# Patient Record
Sex: FEMALE | Race: WHITE
[De-identification: names, ages, dates, MRNs, and addresses within clinical notes are randomized per-mention and may not be internally consistent; named-entity substitution may affect disease eponyms.]

---

## 2021-01-23 ENCOUNTER — HOSPITAL ENCOUNTER (INPATIENT)
Dept: HOSPITAL 97 - ER | Age: 66
LOS: 4 days | Discharge: HOME | DRG: 291 | End: 2021-01-27
Attending: HOSPITALIST | Admitting: INTERNAL MEDICINE
Payer: COMMERCIAL

## 2021-01-23 VITALS — BODY MASS INDEX: 42.6 KG/M2

## 2021-01-23 DIAGNOSIS — Z90.49: ICD-10-CM

## 2021-01-23 DIAGNOSIS — N18.4: ICD-10-CM

## 2021-01-23 DIAGNOSIS — I16.9: ICD-10-CM

## 2021-01-23 DIAGNOSIS — I13.0: Primary | ICD-10-CM

## 2021-01-23 DIAGNOSIS — I50.31: ICD-10-CM

## 2021-01-23 DIAGNOSIS — E87.8: ICD-10-CM

## 2021-01-23 DIAGNOSIS — E87.6: ICD-10-CM

## 2021-01-23 DIAGNOSIS — N17.0: ICD-10-CM

## 2021-01-23 DIAGNOSIS — Z79.899: ICD-10-CM

## 2021-01-23 DIAGNOSIS — Z98.84: ICD-10-CM

## 2021-01-23 DIAGNOSIS — Z20.822: ICD-10-CM

## 2021-01-23 DIAGNOSIS — D64.9: ICD-10-CM

## 2021-01-23 DIAGNOSIS — Z91.14: ICD-10-CM

## 2021-01-23 DIAGNOSIS — Z79.4: ICD-10-CM

## 2021-01-23 DIAGNOSIS — E11.40: ICD-10-CM

## 2021-01-23 DIAGNOSIS — I25.119: ICD-10-CM

## 2021-01-23 DIAGNOSIS — E11.22: ICD-10-CM

## 2021-01-23 DIAGNOSIS — Z98.51: ICD-10-CM

## 2021-01-23 LAB
ALBUMIN SERPL BCP-MCNC: 3 G/DL (ref 3.4–5)
ALP SERPL-CCNC: 78 U/L (ref 45–117)
ALT SERPL W P-5'-P-CCNC: 13 U/L (ref 12–78)
AST SERPL W P-5'-P-CCNC: 15 U/L (ref 15–37)
BUN BLD-MCNC: 23 MG/DL (ref 7–18)
GLUCOSE SERPLBLD-MCNC: 148 MG/DL (ref 74–106)
HCT VFR BLD CALC: 32.7 % (ref 36–45)
INR BLD: 0.95
LYMPHOCYTES # SPEC AUTO: 2.5 K/UL (ref 0.7–4.9)
MAGNESIUM SERPL-MCNC: 2.1 MG/DL (ref 1.8–2.4)
NT-PROBNP SERPL-MCNC: 1767 PG/ML (ref ?–125)
PMV BLD: 8.8 FL (ref 7.6–11.3)
POTASSIUM SERPL-SCNC: 3.3 MMOL/L (ref 3.5–5.1)
RBC # BLD: 3.89 M/UL (ref 3.86–4.86)
SARS-COV-2 RNA RESP QL NAA+PROBE: NEGATIVE
TROPONIN (EMERG DEPT USE ONLY): 0.03 NG/ML (ref 0–0.04)

## 2021-01-23 PROCEDURE — 84439 ASSAY OF FREE THYROXINE: CPT

## 2021-01-23 PROCEDURE — 76770 US EXAM ABDO BACK WALL COMP: CPT

## 2021-01-23 PROCEDURE — 87040 BLOOD CULTURE FOR BACTERIA: CPT

## 2021-01-23 PROCEDURE — 83880 ASSAY OF NATRIURETIC PEPTIDE: CPT

## 2021-01-23 PROCEDURE — 96375 TX/PRO/DX INJ NEW DRUG ADDON: CPT

## 2021-01-23 PROCEDURE — 36415 COLL VENOUS BLD VENIPUNCTURE: CPT

## 2021-01-23 PROCEDURE — 82570 ASSAY OF URINE CREATININE: CPT

## 2021-01-23 PROCEDURE — 85610 PROTHROMBIN TIME: CPT

## 2021-01-23 PROCEDURE — 84132 ASSAY OF SERUM POTASSIUM: CPT

## 2021-01-23 PROCEDURE — 80061 LIPID PANEL: CPT

## 2021-01-23 PROCEDURE — 83735 ASSAY OF MAGNESIUM: CPT

## 2021-01-23 PROCEDURE — 81001 URINALYSIS AUTO W/SCOPE: CPT

## 2021-01-23 PROCEDURE — 82947 ASSAY GLUCOSE BLOOD QUANT: CPT

## 2021-01-23 PROCEDURE — 84156 ASSAY OF PROTEIN URINE: CPT

## 2021-01-23 PROCEDURE — 93306 TTE W/DOPPLER COMPLETE: CPT

## 2021-01-23 PROCEDURE — 99285 EMERGENCY DEPT VISIT HI MDM: CPT

## 2021-01-23 PROCEDURE — 85025 COMPLETE CBC W/AUTO DIFF WBC: CPT

## 2021-01-23 PROCEDURE — 71045 X-RAY EXAM CHEST 1 VIEW: CPT

## 2021-01-23 PROCEDURE — 80048 BASIC METABOLIC PNL TOTAL CA: CPT

## 2021-01-23 PROCEDURE — 84443 ASSAY THYROID STIM HORMONE: CPT

## 2021-01-23 PROCEDURE — 96365 THER/PROPH/DIAG IV INF INIT: CPT

## 2021-01-23 PROCEDURE — 84300 ASSAY OF URINE SODIUM: CPT

## 2021-01-23 PROCEDURE — 0240U: CPT

## 2021-01-23 PROCEDURE — 83036 HEMOGLOBIN GLYCOSYLATED A1C: CPT

## 2021-01-23 PROCEDURE — 84484 ASSAY OF TROPONIN QUANT: CPT

## 2021-01-23 PROCEDURE — 93005 ELECTROCARDIOGRAM TRACING: CPT

## 2021-01-23 PROCEDURE — 80076 HEPATIC FUNCTION PANEL: CPT

## 2021-01-23 NOTE — XMS REPORT
Summary of Care

                           Created on:November 3, 2020



Patient:Josette Faye

Sex:Female

:1955

External Reference #:[id number]





Demographics







                          Address                   [address]



                                                    [place] [address]

 

                          Mobile Phone              [phone]

 

                          Home Phone                [phone]

 

                          Email Address             [email or web address]

 

                          Email Address             [email or web address]

 

                          Preferred Language        English

 

                          Marital Status            Single

 

                          Gnosticist Affiliation     Unknown

 

                          Race                      White

 

                          Ethnic Group              Not  or 









Author







                          Organization              [facility]

 

                          Address                   [address] [place] [id number]









Support







                Name            Relationship    Address         Phone

 

                Jasiel Barber    Unavailable     [address]    [phone]



                                                [place] [address] 

 

                Kristy Luther      Unavailable     [address] [phone]



                                                [place] [address] 









Care Team Providers







                    Name                Role                Phone

 

                    Yinka Luis F DEQUAN     Primary Care Provider [phone]









Reason for Visit







                          Reason                    Comments

 

                          LAB WORK                  







Encounter Details







             Date         Type         Department   Care Team    Description

 

                2020      Technician Visit [facility]     Juan Thompson M D



[address] [place], [place] [address] [phone] (Fax)                      Uncontrolled type 2 diabetes with neurop

athy;



                                                Professional Office 



2, [facility]                              Dyslipidemia



                                       Building Phlebotomy              



                                                            Lab



                                                    



                                       Professional Office              



                                                            Building



                                                    



                                       [address] [place] [address]              



                                                            , [address]



                                                    



                                       [place], [place]              



                                                            [address] [phone]              







Allergies

No Known Allergiesdocumented as of this encounter (statuses as of 2020)



Medications







          Medication Sig       Dispensed Refills   Start Date End Date  Status

 

          amitriptyline 25 mg TAKE 1 TABLET BY           3         2018   

        Active



          tablet    MOUTH TWICE A                                         



                    DAY                                               

 

          amLODIPine 10 mg tablet Take 10 mg by           0         2018  

         Active



                    mouth daily.                                         

 

          Insulin Needles, Use as directed. 360 Each  1         2018      

     Active



          Disposable, (MIHIR PEN 4 times daily. E                                

         



          NEEDLE) 32 gauge x " 11.40                                        

     



          NdleIndications:                                                   



          Uncontrolled type 2                                                   



          diabetes with neuropathy                                              

     

 

          aspirin 81 mg chewable Take 81 mg by           0                      

       Active



          tablet    mouth daily.                                         

 

          gabapentin  mg Take 300 mg by           0                       

      Active



          tablet, extended release mouth daily.                                 

        



          24 hr                                                       

 

          potassium chloride                     0         10/31/2019           

Active



          (KLOR-CON 10) 10 mEq CR                                               

    



          tablet                                                      

 

          losartan-hydrochlorothia                     0         2020     

      Active



          zide 50-12.5 mg per                                                   



          tablet                                                      

 

          furosemide 40 mg tablet                     0         2020      

     Active

 

          insulin aspart U-100 inject 20 Units 54 mL     1         2020   

        Active



          (NOVOLOG FLEXPEN U-100 under the skin 3                               

          



          INSULIN) 100 unit/mL (3 (three) times                                 

        



          mL)       daily before                                         



          injectionIndications: meals. E11.65                                   

      



          Uncontrolled type 2                                                   



          diabetes with neuropathy                                              

     

 

          TRESIBA FLEXTOUCH U-200 inject 60 Units 30 mL     1         2020

           Active



          200 unit/mL (3 mL) under the skin                                     

    



          InPnIndications: every morning.                                       

  



          Uncontrolled type 2 E11.65                                            



          diabetes with neuropathy                                              

     

 

          atorvastatin 20 mg Take 1 tablet by 90 tablet 1         2020    

       Active



          tabletIndications: mouth at                                          



          Dyslipidemia bedtime.                                          



documented as of this encounter (statuses as of 2020)



Active Problems







                          Problem                   Noted Date

 

                          Background diabetic retinopathy of right eye determine

d by examination 

2020

 

                          RADHA (acute kidney injury) 2017

 

                          Obesity (BMI 30-39.9)     2017

 

                          Callus of foot            2017

 

                          Thyromegaly               2017

 

                          Vitamin D deficiency      2016

 

                          Essential hypertension    2016

 

                          Uncontrolled type 2 diabetes with neuropathy 

6

 

                          CKD (chronic kidney disease), stage 3 (moderate) 

 

                          HLD (hyperlipidemia)      2016



documented as of this encounter (statuses as of 2020)



Immunizations







                    Name                Administration Dates Next Due

 

                    Influenza Virus Vaccine Quad .5 mL IM 6+ MO 2020      

    



documented as of this encounter



Social History







             Tobacco Use  Types        Packs/Day    Years Used   Date

 

             Never Smoker                                        









                Smokeless Tobacco: Never Used                                 









                Alcohol Use     Drinks/Week     oz/Week         Comments

 

                No              0 Standard drinks or equivalent 0.0             









                          Sex Assigned at Birth     Date Recorded

 

                          Not on file               









                    COVID-19 Exposure   Response            Date Recorded

 

                    In the last month, have you been in contact with No / Unsure

         11/3/2020  9:47 AM 

CST



                    someone who was confirmed or suspected to have              

       



                    Coronavirus / COVID-19?                     



documented as of this encounter



Last Filed Vital Signs

Not on filedocumented in this encounter



Nursing Notes

Iesha Balbuena - 2020 11:30 AM CSTFormatting of this note might be 
different from the original.

Venipuncture collection performed by clean technique on the right anticubitus. 
Total of 1 attempts were made. Slight pressure and a bandage/dressing were 
applied to the site(s). The patient experiencedno complications. The following 
specimens were processed according to instructions and sent to [facility]
per lab order on today:



 LT BLUE

 SST 1

 RED

 LAV 1

 PPT

 DK GREEN (LiHep)

  DK GREEN (SodH)

 GRAY

 DK BLUE (K2)

 DK BLUE (S)

 ACD

 Blood Culture

 NIPT/NTD

Electronically signed by Iesha Balbuena at 2020 12:00 PM CST
documented in this encounter



Plan of Treatment







             Date         Type         Specialty    Care Team    Description

 

                2021      Office Visit    Endocrinology Diabetes & Jose Thompson MD



                                        



                                       Metabolism   [facility] [place] 

[address] [phone] (Fax) 









                Health Maintenance Due Date        Last Done       Comments

 

                HEPATITIS C (HCV) SCREEN 1955                      

 

                PNEUMOCOCCAL 0-64 YEARS COMBINED 1963                     

 



                SERIES (1 of 3 - PCV13)                                 

 

                DTaP,Tdap,and Td Vaccines (1 - 1976                      



                Tdap)                                           

 

                PAP SMEAR       1978                      

 

                Breast Cancer Screening 1997                      



                (MAMMOGRAM)                                     

 

                COLON CANCER SCREENING ANNUAL 2007                      



                FIT/FOBT                                        

 

                COLON CANCER SCREENING FIT DNA 2007                      



                EVERY 3 YEARS                                   

 

                COLON CANCER SCREENING 2007                      



                SIGMOIDOSCOPY EVERY 5 YEARS                                 

 

                COLONOSCOPY     2007                      

 

                Colorectal Cancer Screening 2007                      

 

                Zoster Recombinant Vaccine 2007                      



                (SHINGRIX) (1 of 2)                                 

 

                CREATININE (SERUM) 2020, 10/04/2018, 



                                                2017, Additional history 



                                                exists          

 

                FOOT EXAM       2020, 2019, 



                                                10/30/2018, Additional history 



                                                exists          

 

                LDL-C           2020, 2017, 



                                                2016      

 

                URINE MICROALBUMIN 2020, 2017 

 

                HgA1C           2021, 2020, 



                                                2019, Additional history 



                                                exists          

 

                EYE EXAM        2021, 2019 

 

                Depression Screening 2021      

 

                INFLUENZA VACCINE Completed       2020      



documented as of this encounter



Implants







          Implanted Type      Area       Device    Shelf     Model /

 Serial



                                                  Identifier Expiration / Lot



                                                            Date      

 

          Lens, Jaime #[id number] - [id number] LENS      Right:    Jaime         

      2022 [id number] /



                                        Implanted: Qty: 1 on 10/25/2018 by Luis Ng MD at [facility]            Eye                                         2

[id number] /



                                                                      [id number]

81

 

          Lens, Jaime #[id number] - [id number] LENS      Left: Eye Jaime          

     2023 [id number] /



                                        Implanted: Qty: 1 on 2019 by Luis Ng MD at [facility]                                                        2

[id number] /



                                                                      N/A



documented as of this encounter



Results

Not on filedocumented in this encounter



Visit Diagnoses







                                        Diagnosis

 

                                        Uncontrolled type 2 diabetes with neurop

athy







                                        Type II or unspecified type diabetes villa

litus with neurological manifestations,



                                        uncontrolled

 

                                        Dyslipidemia







                                        Other and unspecified hyperlipidemia



documented in this encounter



Insurance







          Payer     Benefit Plan Subscriber ID Effective Dates Phone     Address

   Type



                    / Group                                           

 

          [facility] [id number] 2019-Michael [phone] [address]

[address]   PP[address]



           [place]      - OUT OF              t          [address]          [id number]



                                        



                    [place], [place] 



                                                            [address]     









           Guarantor Name Account Type Relation to Date of Birth Phone      Bill

ing



                                 Patient                          Address

 

           FayeJosette Personal/Family Self       1955 [phone] [address]



                                                       (Home)     [address]







                                                                  [place]



                                                                  [address]



documented as of this encounter

## 2021-01-23 NOTE — XMS REPORT
Summary of Care

                           Created on:2020



Patient:Josette Faye

Sex:Female

:1955

External Reference #:[id number]





Demographics







                          Address                   [address]



                                                    [place] [address]

 

                          Mobile Phone              [phone]

 

                          Home Phone                [phone]

 

                          Email Address             [email or web address]

 

                          Email Address             [email or web address]

 

                          Preferred Language        English

 

                          Marital Status            Single

 

                          Synagogue Affiliation     Unknown

 

                          Race                      White

 

                          Ethnic Group              Not  or 









Author







                          Organization              [facility]

 

                          Address                   [address] [place] [id number]









Support







                Name            Relationship    Address         Phone

 

                Jasiel Barber    Unavailable     [address]    [phone]



                                                [place] [address] 

 

                Kristy Luther      Unavailable     [address] [phone]



                                                [place] [address] 









Care Team Providers







                    Name                Role                Phone

 

                    Luis F Honeycutt     Primary Care Provider [phone]









Reason for Visit







                          Reason                    Comments

 

                          Follow-up                 

 

                          Diabetes Mellitus II      

 

                          LAB                       POCT A1c







Encounter Details







             Date         Type         Department   Care Team    Description

 

                2020      Office Visit    [facility]     Juan Thompson MD



                                        Uncontrolled type 2 diabetes with neurop

athy (Primary Dx);



                                       Endocrinology- [address] [facility] Flu vacc

ine need;



                                                            [facility]



                                        Dyslipidemia;



                                       [address], [place] Stage 3

b chronic kidney disease;



                                                            [facility], [address]



                                        [address]



                                        Essential hypertension, benign



                                                [place], [place]    [phone] [address] [phone] (Fax)        



                                       [phone]              







Allergies

No Known Allergiesdocumented as of this encounter (statuses as of 2020)



Medications







          Medication Sig       Dispensed Refills   Start     End Date  Status



                                                  Date                

 

          amitriptyline 25 TAKE 1 TABLET           3                   

 Active



          mg tablet BY MOUTH TWICE                     8                   



                    A DAY                                             

 

          amLODIPine 10 mg Take 10 mg by           0                   

 Active



          tablet    mouth daily.                     8                   

 

          Insulin Needles, Use as    360 Each  1                    Act

melania



          Disposable, (MIHIR directed. 4                     8                   



          PEN NEEDLE) 32 times daily. E                                         



          gauge x " 11.40                                             



          NdleIndications:                                                   



          Uncontrolled type                                                   



          2 diabetes with                                                   



          neuropathy                                                   

 

          aspirin 81 mg Take 81 mg by           0                             Ac

tive



          chewable tablet mouth daily.                                         

 

          gabapentin  Take 300 mg by           0                          

   Active



          mg tablet, mouth daily.                                         



          extended release                                                   



          24 hr                                                       

 

          potassium chloride                     0         10/31/201           A

ctive



          (KLOR-CON 10) 10                               9                   



          mEq CR tablet                                                   

 

          losartan-hydrochlo                     0                    A

ctive



          rothiazide 50-12.5                               0                   



          mg per tablet                                                   

 

          furosemide 40 mg                     0                    Act

melania



          tablet                                  0                   

 

          insulin aspart inject 20 54 mL     1                    Activ

e



          U-100 (NOVOLOG Units under                     0                   



          FLEXPEN U-100 the skin 3                                         



          INSULIN) 100 (three) times                                         



          unit/mL (3 mL) daily before                                         



          injectionIndicatio meals. E11.65                                      

   



          ns: Uncontrolled                                                   



          type 2 diabetes                                                   



          with neuropathy                                                   

 

          TRESIBA FLEXTOUCH inject 60 30 mL     1                    Ac

tive



          U-200 200 unit/mL Units under                     0                   



          (3 mL)    the skin every                                         



          InPnIndications: morning.                                          



          Uncontrolled type E11.65                                            



          2 diabetes with                                                   



          neuropathy                                                   

 

          atorvastatin 20 mg Take 1 tablet 90 tablet 1                 

   Active



          tabletIndications: by mouth at                     0                  

 



          Dyslipidemia bedtime.                                          

 

          Insulin   Use as    150 Syringe 5         10/12/201 11/03/20  Disconti

nued



          Syringe-Needle directed, BID,                     6         20        



          U-100 (INSULIN Dx;E11.9                                          



          SYRINGE) 1 mL 30                                                   



          gauge x 5/16 Syrg                                                   

 

          atorvastatin 20 mg Take 1 tablet 90 tablet 1         

0  Discontinued



          tabletIndications: by mouth at                     0         20       

 (Reorder)



          Dyslipidemia bedtime.                                          

 

          NOVOLIN 70-30 Take 65 units 36 mL     1         20  Di

scontinued



          FLEXPEN U-100 100 with breakfast                     0         20     

   



          unit/mL (70-30) and 45 units                                         



          injectionIndicatio with dinner                                        

 



          ns: Uncontrolled                                                   



          type 2 diabetes                                                   



          with neuropathy                                                   

 

          TRESIBA FLEXTOUCH                     0         20  Di

scontinued



          U-100 100 unit/mL                               0         20        



          (3 mL) InPn                                                   

 

          NOVOLOG FLEXPEN                     0         20  Disc

ontinued



          U-100 INSULIN 100                               0         20        (R

eorder)



          unit/mL (3 mL)                                                   



          injection                                                   



documented as of this encounter (statuses as of 2020)



Active Problems







                          Problem                   Noted Date

 

                          Background diabetic retinopathy of right eye determine

d by examination 

2020

 

                          RADHA (acute kidney injury) 2017

 

                          Obesity (BMI 30-39.9)     2017

 

                          Callus of foot            2017

 

                          Thyromegaly               2017

 

                          Vitamin D deficiency      2016

 

                          Essential hypertension    2016

 

                          Uncontrolled type 2 diabetes with neuropathy 

6

 

                          CKD (chronic kidney disease), stage 3 (moderate) 

 

                          HLD (hyperlipidemia)      2016



documented as of this encounter (statuses as of 2020)



Immunizations







                    Name                Administration Dates Next Due

 

                    Influenza Virus Vaccine Quad .5 mL IM 6+ MO 2020      

    



documented as of this encounter



Social History







             Tobacco Use  Types        Packs/Day    Years Used   Date

 

             Never Smoker                                        









                Smokeless Tobacco: Never Used                                 









                Alcohol Use     Drinks/Week     oz/Week         Comments

 

                No              0 Standard drinks or equivalent 0.0             









                          Sex Assigned at Birth     Date Recorded

 

                          Not on file               









                    COVID-19 Exposure   Response            Date Recorded

 

                    In the last month, have you been in contact with No / Unsure

         11/3/2020  9:47 AM 

CST



                    someone who was confirmed or suspected to have              

       



                    Coronavirus / COVID-19?                     



documented as of this encounter



Last Filed Vital Signs







                Vital Sign      Reading         Time Taken      Comments

 

                Blood Pressure  166/85          2020 10:07 AM 



                                                CST             

 

                Pulse           90              2020 10:03 AM 



                                                CST             

 

                Temperature     -               -               

 

                Respiratory Rate -               -               

 

                Oxygen Saturation -               -               

 

                Inhaled Oxygen Concentration -               -               

 

                Weight          109.6 kg (241 lb 9.6 oz) 2020 10:03 AM 



                                                CST             

 

                Height          162.6 cm (5' 4") 2020 10:03 AM 



                                                CST             

 

                Body Mass Index 41.47           2020 10:03 AM 



                                                CST             



documented in this encounter



Patient Instructions

Patient InstructionsJuan Thompson MD - 2020 10:00 AM CSTIncrease Tresiba 
to 60 units once at morning daily



Continue Novolog  20 units with breakfast  lunch  And  dinner



Start to take atorvastatin 20 units dailyElectronically signed by Juan Thompson MD at 2020  4:58 PM CST

documented in this encounter



Progress Notes

Juan Thompson MD - 2020 10:00 AM CSTFormatting of this note might be 
different from the original.

chief complaint: Type 2 diabetes mellitus- follow up



HPI



Patient is a 64 year old /White female who is here today for Diabetes 
Mellitus Type 2.  Patient's diabetes is complicated by atherogenic diet, 
hypertension , nephropathy: with chronic kidney disease stage, neuropathy: 
Peripheral and lower extremity, retinopathy and obesity. DM has been poorly
controlled with A1C at 9-14 range



LOV was in 2020 with A1C at 11.3 due to not taking insulin Patient was 
advised to increase Tresiba to 55 units and  Novolog to 20 units TIDAC . She ran
out medication a month ago, BG at 400-500s in past month

Glucose readings: Did not bring. Not checking regularly. Reported fasting 120-
300s when taking Tresiba and Novolog consistently; Denies hypoglycemias

She continue being noncompliant with diet/exercise in recent quarantine. She 
gained 19lbs since LOV



Dyslipidemia: resumed Lipitor 20mg in LOV



CKD stage 4: Patient was following  nephrology.

HTN: Managed by PCP

Neuropathy: Managed by PCP



TPO and TFT's normal in  and again in 2019. States her ophthalmologist 
told her she has thyroid disease.



DIABETIC HEALTH MAINTENANCE

Last Ophthalmology visit was 2020, S/p cataract surgery, +on active treatment
right retinopathy. F/u 6months

Patient on ACE/ARB therapy - No ( Hyperkalemia)

Patient on ASA therapy - No.

Patient on Statin/Fibrate therapy -No

Patient instructed about daily feet exams, last sensation exam was today

Patient has received Nutrition/Diet/Diabetes Education on 2019



Patient's Medications

START taking these medications

 TRESIBA FLEXTOUCH U-200 200 UNIT/ML (3 ML) INPN    inject 60 Units under the 
skin every morning. E11.65

CONTINUE taking these medications which have NOT CHANGED

 AMITRIPTYLINE 25 MG TABLET    TAKE 1 TABLET BY MOUTH TWICE A DAY

 AMLODIPINE 10 MG TABLET    Take 10 mg by mouth daily.

 ASPIRIN 81 MG CHEWABLE TABLET    Take 81 mg by mouth daily.

 FUROSEMIDE 40 MG TABLET

 GABAPENTIN  MG TABLET, EXTENDED RELEASE 24 HR    Take 300 mg by mouth 
daily.

 INSULIN NEEDLES, DISPOSABLE, (MIHIR PEN NEEDLE) 32 GAUGE X 5/32" NDLE    Use as 
directed. 4 times daily. E 11.40

 LOSARTAN-HYDROCHLOROTHIAZIDE 50-12.5 MG PER TABLET

 POTASSIUM CHLORIDE (KLOR-CON 10) 10 MEQ CR TABLET

START taking Modified Medications as Prescribed

 Modified Medication Previous Medication

 ATORVASTATIN 20 MG TABLET atorvastatin 20 mg tablet

    Take 1 tablet by mouth at bedtime.    Take 1 tablet by mouth at bedtime.

 INSULIN ASPART U-100 (NOVOLOG FLEXPEN U-100 INSULIN) 100 UNIT/ML (3 ML) 
INJECTION NOVOLOG FLEXPEN U-100 INSULIN 100 unit/mL (3 mL) injection

    inject 20 Units under the skin 3 (three) times daily before meals. E11.65

STOP taking these medications

 INSULIN SYRINGE-NEEDLE U-100 (INSULIN SYRINGE) 1 ML 30 GAUGE X 5/16 SYRG    Use
as directed, BID, Dx;E11.9

 NOVOLIN 70-30 FLEXPEN U-100 100 UNIT/ML (70-30) INJECTION    Take 65 units with
breakfast and 45 units with dinner

 TRESIBA FLEXTOUCH U-100 100 UNIT/ML (3 ML) INPN



HISTORY

Past Medical History:

Diagnosis Date

 RADHA (acute kidney injury)

 Cataract

 DM2 (diabetes mellitus, type 2)

 Hypertension



Past Surgical History:

Procedure Laterality Date

 CHOLECYSTECTOMY

 ENDOSCOPIC CARPAL TUNNEL RELEASE

 OTHER

 Gastric staple

 PHACOEMULSIFICATION OF CATARACT WITH INTRAOCULAR LENS IMPLANT Right 
10/25/2018

 Surgeon: Luis Ng MD;  Location: [facility]

 PHACOEMULSIFICATION OF CATARACT WITH INTRAOCULAR LENS IMPLANT Left 2019

 Surgeon: Luis Ng MD;  Location: [facility]



Family History

Problem Relation Age of Onset

 Coronary Heart Disease Brother



Social History



Socioeconomic History

 Marital status: Single

  Spouse name: Not on file

 Number of children: Not on file

 Years of education: Not on file

 Highest education level: Not on file

Occupational History

 Not on file

Social Needs

 Financial resource strain: Not on file

 Food insecurity

  Worry: Not on file

  Inability: Not on file

 Transportation needs

  Medical: Not on file

  Non-medical: Not on file

Tobacco Use

 Smoking status: Never Smoker

 Smokeless tobacco: Never Used

Substance and Sexual Activity

 Alcohol use: No

  Alcohol/week: 0.0 standard drinks

 Drug use: No

 Sexual activity: Not on file

Lifestyle

 Physical activity

  Days per week: Not on file

  Minutes per session: Not on file

 Stress: Not on file

Relationships

 Social connections

  Talks on phone: Not on file

  Gets together: Not on file

  Attends [employer] service: Not on file

  Active member of club or organization: Not on file

  Attends meetings of clubs or organizations: Not on file

  Relationship status: Not on file

 Intimate partner violence

  Fear of current or ex partner: Not on file

  Emotionally abused: Not on file

  Physically abused: Not on file

  Forced sexual activity: Not on file

Other Topics Concern

 Not on file

Social History Narrative

 Not on file



REVIEW OF SYSTEMS

Constitutional: + weight gain, + fatigue

Eyes: + blurry vision R&gt;L, right eye almost blind, denies diplopia and denies
pain.

Neck: denies pain, denies swollen glands

Cardiovascular: denies chest pain , denies irregular pulse and denies 
palpitations.

Respiratory: denies dyspnea on exertion and denies shortness of breath.

Gastrointestinal: denies abdominal pain, denies constipation and denies 
diarrhea.

Genitourinary: denies burning and denies dysuria.

Musculoskeletal: denies back pain, denies muscle pain and denies weakness.

Skin: denies dry skin and denies hair changes.

Neuro: + numbness , + tingling and denies tremor.

Psych: negative.

Endocrine: +hyperglycemia  denies intolerance to heat, denies polydipsia, denies
polyphagia



PHYSICAL EXAM



POCT GLU (mg/dL)

Date Value

2019 268 (H)



CREATININE (mg/dL)

Date Value

2020 2.46 (H)



Creatinine, Serum-LC (mg/dL)

Date Value

2016 1.19 (H)



CHOL (mg/dL)

Date Value

2020 318 (H)



Cholesterol, Total-LC (mg/dL)

Date Value

2016 271 (H)



HDL Cholesterol-LC (mg/dL)

Date Value

2016 64



HDL (mg/dL)

Date Value

2020 36 (L)



LDL CHOL (no units)

Date Value

2020

  Comment:

  Unable to calculate LDL due to elevated triglyceride level greater than 400 
mg/dL.



LDL Cholesterol Calc-LC (mg/dL)

Date Value

2016 169 (H)



TRIG (mg/dL)

Date Value

2020 517 (H)



Triglycerides-LC (mg/dL)

Date Value

2016 190 (H)



MICROAL/CR (mg/g of creatinine)

Date Value

2020 802 (H)



POCT HBA1C (%)

Date Value

2020 13.1 (A)

2020 11.5





BP (!) 166/85  | Pulse 90  | Ht 5' 4" (1.626 m)  | Wt 241 lb 9.6 oz (109.6 kg)  
| BMI 41.47 kg/m

General: alert, oriented times three, no apparent distress, appearing age 
appropriate.

Skin: skin color and turgor are normal

Head: normocephalic, no masses, lesions, tenderness or abnormalities.

Eyes: anicteric sclera,

Neck: +acanthosis nigricans

Thyroid: normal size and consistency to palaption

Lungs: good diaphragmatic excursion, lungs clear to auscultation bilaterally.

Heart: regular rate and rhythm, no murmurs, gallops or rubs.

Abdomen: abdomen soft,

Neuro: unremarkable without focal findings.

Extremities/Musculoskeletal: no cyanosis, no edema .

Sensory exam of the foot is normal. Monofilament exam with sensation Right: 5/5,
Left: 5/5. Lesions absent Ulcers Absent Peripheral pulses present 2+.



ASSESSMENT/PLAN



Uncontrolled type 2 diabetes with neuropathy

-A1C (target=6-7%): 9.4 (10/18) --&gt;12.2() 
---&gt;11.3()---&gt;13.2() poor control due to not taking insulin

-glucose range: reports hyperglycemia thru the day

- without hypoglycemia

-complication: neuropathy CKD

-medication limitation: ?metformin cause GI symptoms

-diet: noncompliant

-exercise: limited by joint pain



Plan

-reinterated to check glucose regualrly alternating fasting and 2 hours post 
meals

-urged compliance with diet/exercise

- POCT HEMOGLOBIN A1C TEST

- COMP. METABOLIC PANEL (77802); Future

- CBC WITH DIFF; Future

- THYROID STIMULATING HORMONE; Future

- T4 FREE; Future

- MICROALBUMIN URINE - SPOT SAMPLE; Future

- insulin aspart U-100 (NOVOLOG FLEXPEN U-100 INSULIN) 100 unit/mL (3 mL) 
injection; inject 20 Unitsunder the skin 3 (three) times daily before meals. 
E11.65  Dispense: 54 mL; Refill: 1

- TRESIBA FLEXTOUCH U-200 200 unit/mL (3 mL) InPn; inject 60 Units under the 
skin every morning. E11.65  Dispense: 30 mL; Refill: 1

Patient Instructions

Increase Tresiba to 60 units once at morning daily



Continue Novolog  20 units with breakfast  lunch  And  dinner



Start to take atorvastatin 20 units daily



Flu vaccine need

- FLU VACC(2716-3033), 6+ MONTHS, IM, QUAD (FLUZONE/FLULAVAL/FLUARIX)



Dyslipidemia

High risk

PCP change to atorvastatin since 2019

Plan

- LIPID PANEL (64284)(TOTAL CHOLESTEROL, TRIGLYCERIDES, HDL); Future

- atorvastatin 20 mg tablet; Take 1 tablet by mouth at bedtime.  Dispense: 90 
tablet; Refill: 1



Stage 3b chronic kidney disease

Essential hypertension, benign

Comment: GFR30

Plan:

Advised to follow up with nephrology



Electronically signed by Juan Thompson MD at 2020  5:13 PM CSTdocumented 
in this encounter



Plan of Treatment







             Date         Type         Specialty    Care Team    Description

 

                2021      Office Visit    Endocrinology Diabetes & Jose Thompson MD



                                        



                                       Metabolism   [address] [place] 

[address]



                                        



                                                                [phone] (Fax) 









                Health Maintenance Due Date        Last Done       Comments

 

                HEPATITIS C (HCV) SCREEN 1955                      

 

                PNEUMOCOCCAL 0-64 YEARS COMBINED 1963                     

 



                SERIES (1 of 3 - PCV13)                                 

 

                DTaP,Tdap,and Td Vaccines (1 - 1976                      



                Tdap)                                           

 

                PAP SMEAR       1978                      

 

                Breast Cancer Screening 1997                      



                (MAMMOGRAM)                                     

 

                COLON CANCER SCREENING ANNUAL 2007                      



                FIT/FOBT                                        

 

                COLON CANCER SCREENING FIT DNA 2007                      



                EVERY 3 YEARS                                   

 

                COLON CANCER SCREENING 2007                      



                SIGMOIDOSCOPY EVERY 5 YEARS                                 

 

                COLONOSCOPY     2007                      

 

                Colorectal Cancer Screening 2007                      

 

                Zoster Recombinant Vaccine 2007                      



                (SHINGRIX) (1 of 2)                                 

 

                FOOT EXAM       2020, 2019, 



                                                10/30/2018, Additional history 



                                                exists          

 

                HgA1C           2021, 2020, 



                                                2019, Additional history 



                                                exists          

 

                EYE EXAM        2021, 2019 

 

                CREATININE (SERUM) 2021, 2019, 



                                                10/04/2018, Additional history 



                                                exists          

 

                Depression Screening 2021      

 

                LDL-C           2021, 2019, 



                                                2017, Additional history 



                                                exists          

 

                URINE MICROALBUMIN 2021, 2019, 



                                                2017      

 

                INFLUENZA VACCINE Completed       2020      



documented as of this encounter



Implants







          Implanted Type      Area       Device    Shelf     Model /

 Serial



                                                  Identifier Expiration / Lot



                                                            Date      

 

          Lens, Jaime #[id number] - [id number] LENS      Right:    Jaime         

      2022 SN60WF /



                                        Implanted: Qty: 1 on 10/25/2018 by Luis Ng MD at [facility]            Eye                                         2

[id number] /



                                                                      [id number]

81

 

          Lens, Jaime #Sn60wf - [id number] LENS      Left: Eye Jaime          

     2023 SN60WF /



                                        Implanted: Qty: 1 on 2019 by Luis Ng MD at [facility]                                                        2

[id number] /



                                                                      N/A



documented as of this encounter



Procedures







             Procedure Name Priority     Date/Time    Associated Diagnosis Comme

nts

 

             FLU VACC     Routine      2020 10:10 Flu vaccine need 



             ([id number]), 6+              AM CST                    



             MONTHS, IM, QUAD                                        

 

             POCT HEMOGLOBIN A1C Routine      2020   Uncontrolled type 2 R

esults for this



             TEST                                   diabetes with procedure are 

in



                                                    neuropathy   the results



                                                                 section.



documented in this encounter



Results

MICROALBUMIN URINE - SPOT SAMPLE (2020  1:17 PM CST)





             Component    Value        Ref Range    Performed At Pathologist Sig

nature

 

             CREAT U      34.4         mg/dL        [facility] LABORATORY 



                                                    SERVICES     

 

             MICROALB U   276 (H)      0 - 45 ug/mL [facility] LABORATORY 



                                                    SERVICES     

 

             MICROAL/CR   802 (H)      0 - 30 mg/g of [facility] LABORATORY 



                                       creatinine   SERVICES     









                                        Specimen

 

                                        Urine - URINE, CLEAN CATCH









                          Narrative                 Performed At

 

                                        Normal: <30 mg/g creatinine



                                        [facility] SERVICES



                                        Microalbuminuria: 30 - 299 mg/g creatini

ne



                                        



                          Clinical albuminuria: > 300 mg/g creatinine 









                Performing Organization Address         City/State/Zipcode Phone

 Number

 

                          [facility] LABORATORY SERVICES  CLIA:  [id number]



                          [place] [address]       [phone]



                                [address] [place] [address]                 



LIPID PANEL (72797)(TOTAL CHOLESTEROL, TRIGLYCERIDES, HDL) (2020 11:36 AM 
CST)





             Component    Value        Ref Range    Performed At Pathologist



                                                                 Signature

 

             CHOL         318 (H)      120 - 200    [facility] 



                                       mg/dL        [facility] LABORATORY 

 

             HDL          36 (L)       >50 mg/dL    [facility] LABORATORY 

 

             HDLC RATIO   8.8 (H)      <=4.5        [facility] LABORATORY 

 

             TRIG         517 (H)      30 - 170 mg/dL [facility] LABORATORY 

 

             LDL CHOL     Comment: Unable to              [facility] 



                          calculate LDL due to              HOSPITAL LAB[facility]ATORY 



                          elevated triglyceride                           



                          level greater than                           



                          400 mg/dL.                             

 

             VLDL         103 (H)      5 - 60 mg/dL [facility] LABORATORY 









                                        Specimen

 

                                        Blood









                Performing Organization Address         City/[facility] Phone

 Number

 

                          [facility] CLIA: [id number]



                          [place] [address]        [phone]



                LABORATORY      [address]                 



T4 FREE (2020 11:36 AM CST)





             Component    Value        Ref Range    Performed At Pathologist Sig

nature

 

             FREE T4      1.36         0.78 - 2.20 [facility]

[facility] 



                                                    LAB[facility]ATORY   









                                        Specimen

 

                                        Blood









                Performing Organization Address         City/[facility] Phone

 Number

 

                          [facility] CLIA: [id number]



                          [place] [address]        [phone]



                LABORATORY      [address]                 



THYROID STIMULATING HORMONE (2020 11:36 AM CST)





             Component    Value        Ref Range    Performed At Pathologist Sig

nature

 

             TSH          3.41         0.45 - 4.70 [facility]IU/L [facility]

AL 



                                                    LABORATORY   









                                        Specimen

 

                                        Blood









                Performing Organization Address         City/State/[facility] Phone

 Number

 

                          [facility] CLIA: [id number]



                          [place] [address]        [phone]



                LABORATORY      [address]                 



CBC WITH DIFF (2020 11:36 AM CST)





             Component    Value        Ref Range    Performed At Pathologist Sig

nature

 

             WBC          7.57         4.30 - 11.10 [facility] 



                                       10*3/L     [facility] LABORATORY 

 

             RBC          3.91 (L)     3.93 - 5.25  [facility] 



                                       10*6/L     [facility] LABORATORY 

 

             HGB          10.8 (L)     11.6 - 15.0  [facility] 



                                       g/dL         [facility] LABORATORY 

 

             HCT          32.8 (L)     35.7 - 45.2 % [facility] LAB[facility]AT[facility] 

 

             MCV          83.9         80.6 - 95.5 fL [facility] LAB[facility]ATORY 

 

             MCH          27.6         25.9 - 32.8 pg [facility] LABORATORY 

 

             MCHC         32.9         31.6 - 35.1  [facility] 



                                       g/dL         [facility] LABORAT[facility] 

 

             RDW-SD       45.2         39.0 - 49.9 fL [facility] LAB[facility]AT[facility] 

 

             RDW-CV       14.8         12.0 - 15.5 % [facility] LAB[facility]AT[facility] 

 

             PLT          335          166 - 358    [facility] 



                                       10*3/L     [facility] LABORATORY 

 

             MPV          11.7         9.5 - 12.9 fL [facility] LAB[facility]AT[facility] 

 

             NRBC/100 WBC 0.0          0.0 - 10.0 /100 [facility] 



                                       WBCs         HOSPITAL LABORATORY 

 

             NRBC x10^3   <0.01        10*3/L     [facility] LABORATORY 

 

             GRAN MAT (NEUT) % 63.9         %            [facility] LABORATORY 

 

             IMM GRAN %   0.50         %            [facility] LAB[facility]ATORY 

 

             LYMPH %      21.7         %            [facility] LAB[facility]ATORY 

 

             MONO %       7.4          %            [facility] LAB[facility]AT[facility] 

 

             EOS %        5.3          %            [facility] LABORATORY 

 

             BASO %       1.2          %            [facility] LAB[facility]ATORY 

 

             GRAN MAT x10^3(ANC) 4.84         1.88 - 7.09  [facility] 



                                       10*3/uL      HOSPITAL LABORATORY 

 

             IMM GRAN x10^3 0.04         0.00 - 0.06  [facility] 



                                       10*3/uL      HOSPITAL LABORATORY 

 

             LYMPH x10^3  1.64         1.32 - 3.29  [facility] 



                                       10*3/uL      HOSPITAL LABORATORY 

 

             MONO x10^3   0.56         0.33 - 0.92  [facility] 



                                       10*3/uL      HOSPITAL LABORATORY 

 

             EOS x10^3    0.40 (H)     0.03 - 0.39  [facility] 



                                       10*3/uL      HOSPITAL LABORATORY 

 

             BASO x10^3   0.09 (H)     0.01 - 0.07  [facility] 



                                       10*3/uL      HOSPITAL LABORATORY 









                                        Specimen

 

                                        Blood









                Performing Organization Address         City/State/Zipcode Phone

 Number

 

                          [facility] CLIA: [id number]



                          [place] [address]        [phone]



                LABORATORY      [address]                 



COMP. METABOLIC PANEL (77625) (2020 11:36 AM CST)





             Component    Value        Ref Range    Performed At Pathologist



                                                                 Signature

 

             NA           126 (L)      135 - 145    [facility] 



                                       mmol/L       [facility] LABORAT[facility] 

 

             K            4.6          3.5 - 5.0    [facility] 



                                       mmol/L       [facility] LAB[facility]AT[facility] 

 

             CL           87 (L)       98 - 108 mmol/L [facility] 

 

             CO2 TOTAL    26           23 - 31 mmol/L [facility] 

 

             AGAP         13           2 - 16       [facility] 

 

             BUN          40 (H)       7 - 23 mg/dL [facility] 

 

             GLUCOSE      606 (HH)     70 - 110 mg/dL [facility] 

 

             CREATININE   2.46 (H)     0.50 - 1.04  [facility] 



                                       mg/dL        [facility] LAB[facility]AT[facility] 

 

             TOTAL BILI   0.7          0.1 - 1.1 mg/dL [facility] 

 

             CALCIUM      9.3          8.6 - 10.6   [facility] 



                                       mg/dL        [facility] LAB[facility]AT[facility] 

 

             T PROTEIN    7.1          6.3 - 8.2 g/dL [facility] 

 

             ALBUMIN      3.9          3.5 - 5.0 g/dL [facility] 

 

             ALK PHOS     118          34 - 122 U/L [facility] 

 

             ALTv         13           5 - 35 U/L   [facility] LAB[facility]AT[facility] 

 

             AST(SGOT)    17           13 - 40 U/L  [facility] 

 

             eGFR Calculation 19.9         mL/min/1.73m2 [facility] 



             ([facility] LABORATORY 



             American)                                           

 

             eGFR Calculation 24.1         mL/min/1.73m2 [facility] 



             (African American)                           [facility] LABORATORY 









                                        Specimen

 

                                        Blood









                          Narrative                 Performed At

 

                          [facility] Glomerular Filtration Rate (GFR) [facility] 

LABORATORY



                                        and Staging of Kidney Disease*



                                        



                                         



                                        



                          +-----------------------+---------------------+- 



                                        ------------------------+



                                        



                          | GFR (mL/min/1.73 m2) | With Kidney Damage 



                                        | Without Kidney Damage



                                        



                          +-----------------------+---------------------+- 



                                        ------------------------+



                                        



                          | >90         | Stage one 



                              |  Normal        



                                        



                                        



                          +-----------------------+---------------------+- 



                                        ------------------------+



                                        



                          | 60-89        | Stage two 



                                            |  Decreased GFR   

  



                                        



                          +-----------------------+---------------------+- 



                                        ------------------------+



                                        



                          | 30-59        | Stage three 



                                           |  Stage three     

 



                                        



                          +-----------------------+---------------------+- 



                                        ------------------------+



                                        



                          | 15-29        | Stage four 



                                            |  Stage four     

 



                                        



                          +-----------------------+---------------------+- 



                                        ------------------------+



                                        



                          | <15 (or dialysis)  | Stage five   



                                          |  Stage five      



                                        



                          +-----------------------+---------------------+- 



                                        ------------------------+



                                        



                                         



                                        



                          *Each stage assumes the associated GFR level has 



                          been in effect for at least three months. 



                          Stages 1 to 5, with or without kidney disease, 



                                        indicate chronic kidney disease.



                                        



                                         



                                        



                          Notes: Determination of stages one and two (with 



                          eGFR >59mL/min/1.73 m2) requires estimation of 



                          kidney damage for at least three months as 



                          defined by structural or functional 



                          abnormalities of the kidney, manifested by 



                                        either:



                                        



                          Pathological abnormalities or Markers of kidney 



                          damage (including abnormalities in the 



                          composition of the blood or urine or 



                                        abnormalities in imaging tests). 



                                        



                                                    









                Performing Organization Address         City/State/Zipcode Phone

 Number

 

                          [facility] CLIA: [id number]



                          [place], [place] [id number] [phone]



                LABORATORY      [address]                 



POCT HEMOGLOBIN A1C TEST (2020)





             Component    Value        Ref Range    Performed At Pathologist Sig

nature

 

             POCT HBA1C   13.1 (A)     4 - 6 %                   









                                        Specimen

 

                                        Blood - CAPILLARY



documented in this encounter



Visit Diagnoses







                                        Diagnosis

 

                                        Uncontrolled type 2 diabetes with neurop

athy - Primary







                                        Type II or unspecified type diabetes villa

litus with neurological manifestations,



                                        uncontrolled

 

                                        Flu vaccine need







                                        Need for prophylactic vaccination and in

oculation against influenza

 

                                        Dyslipidemia







                                        Other and unspecified hyperlipidemia

 

                                        Stage 3b chronic kidney disease

 

                                        Essential hypertension, benign



documented in this encounter



Insurance







          Payer     Benefit Plan Subscriber ID Effective Dates Phone     Address

   Type



                    / Group                                           

 

          [id number] [phone] [address]

[address]   PPO/POS



           [place]      - OUT OF              t          7          [id number]



                                        



                    [place] 



                                                            [address]     









           Guarantor Name Account Type Relation to Date of Birth Phone      Bill

ing



                                 Patient                          Address

 

           Josette Faye Personal/Family Self       1955 [phone]



                                                       (Home)     [address]







                                                                  [place]



                                                                  [address]



documented as of this encounter

## 2021-01-23 NOTE — P.HP
Certification for Inpatient


Patient admitted to: Observation


With expected LOS: <2 Midnights


Patient will require the following post-hospital care: None


Practitioner: I am a practitioner with admitting privileges, knowledge of 

patient current condition, hospital course, and medical plan of care.


Services: Services provided to patient in accordance with Admission requirements

found in Title 42 Section 412.3 of the Code of Federal Regulations





Patient History


Date of Service: 01/23/21


Primary Care Provider: Dr. Honeycutt


Reason for admission: Dyspnea, chest pain


History of Present Illness: 


65-year-old  female with history of diabetes mellitus type 2, 

hypertension, CKD 4 presents to the emergency department for shortness of 

breath, chest pain.  Patient reports that for the last 24 hr she has been 

significantly short of breath, having some chest discomfort.  Patient reports 

that she does chronically have some level of shortness of breath that she 

attributes to being obese.  Patient reports that yesterday when she was sleeping

she was woken from her sleep with significant shortness of breath and had to 

walk to the living room to sit in a chair to recover, does describe some 

orthopnea and dyspnea on exertion.  Patient also noted to have 1+ pitting edema 

bilateral lower extremities, patient reports that she takes for Lasix once 

daily, unsure of the dose, reports that she was told to take it because she has 

extra fluid on her legs but not why.  Patient also noted to have a GFR of 28, 

similar to last year but patient does not admit to any renal failure.  Patient 

does take losartan, Lasix, tresiba at home.  Patient was evaluated in the 

emergency department, labs remarkable for a white blood cell count 8.9 h

emoglobin 10.9 hematocrit 32.7 potassium 3.3 creatinine 1.8 to GFR 28 BNP 1767 

chest x-ray with questionable pneumonia versus pulmonary edema.  Patient reports

not taking her Lasix today as she was getting short of breath when she is 

walking.  ED provider wishes to admit patient under observation for further 

evaluation and management. 





Allergies





No Known Drug Allergies Allergy (Verified 03/20/15 17:12)


   Unknown





Home Medications: 








Amlodipine Besylate 1 tab PO DAILY 03/20/15 


Gabapentin 1 cap PO DAILY 03/20/15 


Metformin HCl 2 tab PO BID 03/20/15 


Tramadol HCl 1 tab PO DAILY PRN 03/20/15 


Ciprofloxacin HCl [Cipro] 250 mg PO BID 5 Days  tab 03/23/15 


Insulin Detemir [Levemir] 35 unit SQ DAILY #30 ml 03/23/15 


Magnesium [Magnesium Gluconate] 500 mg PO TID #30 tablet 03/23/15 


metroNIDAZOLE [Flagyl] 250 mg PO TID 5 Days  tablet 03/23/15 








- Past Medical/Surgical History


Diabetic: Yes


-: Hypertension


-: Diabetes mellitus type 2-insulin dependent


-: CKD 4


-: CHF??


-: stomach stapled


-: gastric bypass


-: tubes tied


-: cholectectomy


Psychosocial/ Personal History: Patient retired, lives with family





- Family History


  ** Father


-: Cancer





  ** Mother


-: Hypertension, Diabetes





  ** Brother


-: Diabetes





  ** Sister


-: Diabetes





- Social History


Smoking Status: Never smoker


Alcohol use: No


CD- Drugs: No


Caffeine use: Yes


Place of Residence: Home





Review of Systems


10-point ROS is otherwise unremarkable


Respiratory: Cough, Dry, Shortness of Breath, SOB with Excertion


Cardiovascular: Chest Pain, Orthopnea, Paroxysmal Noc. Dyspnea





Physical Examination





- Physical Exam


General: Alert, In no apparent distress, Oriented x3


HEENT: Atraumatic, Normocephalic, PERRLA


Neck: Supple


Respiratory: Normal air movement, Crackles/rales (Bibasilar)


Cardiovascular: Regular rate/rhythm, Normal S1 S2, No murmurs, Edema (1+ pitting

 edema bilateral lower extremities)


Capillary refill: <2 Seconds


Gastrointestinal: Normal bowel sounds, No tenderness


Musculoskeletal: No contractures, No erythema, No tenderness


Integumentary: No tenderness/swelling, No erythema, No warmth


Neurological: Normal strength at 5/5 x4 extr, Normal tone, Sensation intact





- Studies


Laboratory Data (last 24 hrs)





01/23/21 20:15: PT 11.2, INR 0.95


01/23/21 20:15: WBC 8.9, Hgb 10.9 L, Hct 32.7 L, Plt Count 319


01/23/21 20:15: Sodium 143, Potassium 3.3 L, BUN 23 H, Creatinine 1.82 H, Gluco

se 148 H, Magnesium 2.1, Total Bilirubin 0.5, AST 15, ALT 13, Alkaline 

Phosphatase 78








Assessment and Plan





- Plan


Assessment


Dyspnea-suspect underlying undiagnosed CHF


Chest pain


Diabetes mellitus type 2


Hypertension





Plan


Dyspnea-suspect underlying undiagnosed CHF:  Patient given 40 mg of Lasix in the

 emergency department, provide with 1500 cc per day fluid restriction.  

Cardiology consult in place could likely benefit from outpatient echocardiogram.

  Continue with IV diuresis, may need to adjust medications due to renal 

function.  Appreciate further input from cardiology/nephrology.


Chest pain:  Trend troponins, cardiology consult in place, monitor on telemetry.


Diabetes mellitus type 2:  A.c. HS Accu-Cheks scale insulin therapy.


Hypertension:  Will need to renally dose home medications, verify and restart as

 appropriate.


Discharge Plan: Home


Plan to discharge in: 24 Hours





- Advance Directives


Does patient have a Living Will: No


Does patient have a Durable POA for Healthcare: No





- Code Status/Comfort Care


Code Status Assessed: Yes (Full code)


Critical Care: No


Time Spent Managing Pts Care (In Minutes): 55

## 2021-01-23 NOTE — XMS REPORT
Continuity of Care Document

                           Created on:2021



Patient:MARIN LANGLEY

Sex:Female

:1955

External Reference #:[id number]





Demographics







                          Address                   [address]



                                                    [place] [address]

 

                          Home Phone                [phone]

 

                          Work Phone                [phone]

 

                          Mobile Phone              [phone]

 

                          Email Address             [email or web address]

 

                          Preferred Language        English

 

                          Marital Status            Unknown

 

                          [employer] Affiliation     Unknown

 

                          Race                      Unknown

 

                          Additional Race(s)        Unavailable



                                                    White

 

                          Ethnic Group              Unknown









Author







                          Organization              [facility]

t

 

                          Address                   [address]



                                                    [place] [address]

 

                          Phone                     [phone]









Support







                Name            Relationship    Address         Phone

 

                Jasiel Barber   Life Partner    [address]    [phone]



                                                [place] [address] 

 

                Kristy Luther           [address] [phone]



                                                [place] [address] 









Care Team Providers







                    Name                Role                Phone

 

                    Juan Thompson MD     Attending Clinician [phone]









Problems

This patient has no known problems.



Allergies, Adverse Reactions, Alerts

This patient has no known allergies or adverse reactions.



Medications

This patient has no known medications.



Procedures

This patient has no known procedures.



Encounters







        Start   End     Encounter Admission Attending Care    Care    Encounter 

Source



        Date/Time Date/Time Type    Type    Clinicians Facility Department ID   

   

 

        2020 Office          LAYNE Thompson    [phone] [id number]

30 



        09:53:21 10:52:57 Visit           Juan Topete [id number]         



                                                [facility] [id number]         



                                                [place] [phone]         



                                                [facility]     [address]             



                                                Building                 







Results

This patient has no known results.

## 2021-01-23 NOTE — ER
Nurse's Notes                                                                                     

 [facility]                                                                 

Name: Josette Faye                                                                                 

Age: 65 yrs                                                                                       

Sex: Female                                                                                       

: 1955                                                                                   

MRN: [id number]                                                                                   

Arrival Date: 2021                                                                          

Time: 19:35                                                                                       

Account#: [id number]                                                                            

Bed 4                                                                                             

Private MD: Luis F Honeycutt                                                                         

Diagnosis: Congestive Heart failure                                                               

                                                                                                  

Presentation:                                                                                     

                                                                                             

19:59 Chief complaint: Patient states: SOB since last night. Coughing all day. Denies fever.  ca1 

      Chest pains, intermittent since yesterday. Coronavirus screen: Client denies travel out     

      of the [place] in the last 14 days. cough unrelated to allergies, shortness of breath,         

      Client presents with at least one sign or symptom that may indicate coronavirus-19.         

      Standard/surgical mask placed on the client. Provider contacted for isolation               

      considerations. Ebola Screen: Patient negative for fever greater than or equal to 101.5     

      degrees Fahrenheit, and additional compatible Ebola Virus Disease symptoms Patient          

      denies exposure to infectious person. Patient denies travel to an Ebola-affected area       

      in the 21 days before illness onset. No symptoms or risks identified at this time.          

      Initial Sepsis Screen: Does the patient meet any 2 criteria? No. Patient's initial          

      sepsis screen is negative. Does the patient have a suspected source of infection? No.       

      Patient's initial sepsis screen is negative. Risk Assessment: Do you want to hurt           

      yourself or someone else? Patient reports no desire to harm self or others. Onset of        

      symptoms was 2021.                                                              

19:59 Method Of Arrival: Ambulatory                                                           ca1 

19:59 Acuity: AMPARO 2                                                                           ca1 

                                                                                                  

Historical:                                                                                       

- Allergies:                                                                                      

20:02 No Known Allergies;                                                                     ca1 

- PMHx:                                                                                           

20:02 Hypertension; Diabetes - IDDM;                                                          ca1 

- PSHx:                                                                                           

20:02 Carpal Tunnel Repair; Tubal ligation; Cholecystectomy; Tonsillectomy; stomach staple;   ca1 

                                                                                                  

- Immunization history:: Flu vaccine is up to date.                                               

- Social history:: Smoking status: Patient denies any tobacco usage or history of.                

                                                                                                  

                                                                                                  

Screenin:14 Abuse screen: Denies threats or abuse. Nutritional screening: No deficits noted.        ea  

      Tuberculosis screening: No symptoms or risk factors identified. Fall Risk IV access (20     

      points).                                                                                    

                                                                                                  

Assessment:                                                                                       

20:10 General: Appears uncomfortable, Behavior is calm, cooperative, appropriate for age.     ea  

      Pain: Complains of pain in chest Aggravated by coughing. Neuro: Level of Consciousness      

      is awake, alert, obeys commands, Oriented to person, place, time. Cardiovascular:           

      Patient's skin is warm and dry. Respiratory: Airway is patent Respiratory effort is         

      even, unlabored, Respiratory pattern is regular, symmetrical. Derm: Skin is pink, warm      

      \T\ dry.                                                                                    

21:30 Reassessment: Patient and/or family updated on plan of care and expected duration. Pain ea  

      level reassessed. Patient is alert, oriented x 3, equal unlabored respirations, skin        

      warm/dry/pink.                                                                              

23:30 Reassessment: Patient and/or family updated on plan of care and expected duration. Pain ea  

      level reassessed. Patient is alert, oriented x 3, equal unlabored respirations, skin        

      warm/dry/pink.                                                                              

                                                                                                  

Vital Signs:                                                                                      

19:59  / 114; Pulse 99; Resp 20; Temp 97.8(TE); Pulse Ox 99% on R/A; Weight 112.04 kg   ca1 

      (R); Height 5 ft. 4 in. (162.56 cm) (R); Pain 4/10;                                         

21:01  / 100; Pulse 94; Resp 19; Pulse Ox 99% ;                                         ea  

23:31  / 93; Pulse 90; Resp 18; Pulse Ox 98% on R/A;                                    ea  

19:59 Body Mass Index 42.40 (112.04 kg, 162.56 cm)                                            ca1 

                                                                                                  

ED Course:                                                                                        

19:35 Patient arrived in ED.                                                                  es  

19:36 Luis F Honeycutt MD is Private Physician.                                                 es  

19:52 Nikos Trent MD is Attending Physician.                                             mh7 

20:01 Triage completed.                                                                       ca1 

20:02 Arm band placed on right wrist.                                                         ca1 

20:14 Angie Gallardo, BHARAT is Primary Nurse.                                                    ea  

20:15 Patient has correct armband on for positive identification. Bed in low position. Call   ea  

      light in reach. Side rails up X2. Cardiac monitor on. Pulse ox on. NIBP on.                 

20:52 XRAY Chest (1 view) In Process Unspecified.                                             EDMS

22:47 Holland Yan MD is Hospitalizing Provider.                                         mh7 

23:30 No provider procedures requiring assistance completed. Patient admitted, IV remains in  ea  

      place.                                                                                      

                                                                                                  

Administered Medications:                                                                         

22:40 Drug: Rocephin - (cefTRIAXone) 1 grams Route: IVPB; Infused Over: 30 mins; Site: right  ea  

      antecubital;                                                                                

23:30 Follow up: Response: No adverse reaction; IV Status: Completed infusion; IV Intake: 10mlea  

22:47 Not Given (Other Intervention Used): Albuterol 2.5 mg Inhalation once                   ea  

22:48 Drug: Lasix 40 mg Route: IVP; Site: right antecubital;                                  ea  

23:32 Follow up: Response: No adverse reaction                                                ea  

23:11 Drug: Zithromax 500 mg Route: IVPB; Infused Over: 1 hrs; Site: right antecubital;       ea  

23:41 Follow up: Response: No adverse reaction; IV Status: Infusion continued upon admission  ea  

                                                                                                  

                                                                                                  

Intake:                                                                                           

23:30 IV: 10ml; Total: 10ml.                                                                  ea  

                                                                                                  

Outcome:                                                                                          

22:49 Decision to Hospitalize by Provider.                                                    [facility] 

23:30 Instructed on the need for admit, Demonstrated understanding of instructions.           ea  

23:40 Admitted to Med/surg accompanied by tech, via wheelchair, room 215, with chart, Report  ea  

      called to  Receiving nurse on second floor                                                  

23:41 Condition: stable                                                                       ea  

23:42 Patient left the ED.                                                                    ea  

                                                                                                  

Signatures:                                                                                       

Dispatcher [employer]                           Adrianna Flores Elena RN                      Stephanie Austin ea RN                        Nikos Becerra MD MD   7                                                  

                                                                                                  

**************************************************************************************************

## 2021-01-23 NOTE — XMS REPORT
Summary of Care

                          Created on:2020



Patient:Josette Faye

Sex:Female

:1955

External Reference #:[id number]





Demographics







                          Address                   [address] [place] [address]

 

                          Mobile Phone              [phone]

 

                          Home Phone                [phone]

 

                          Email Address             [email or web address]

 

                          Email Address             [email or web address]

 

                          Preferred Language        English

 

                          Marital Status            Single

 

                          Mormon Affiliation     Unknown

 

                          Race                      White

 

                          Ethnic Group              Not  or 









Author







                          Organization              [facility]

 

                          Address                   [address] [place] [address]









Support







                Name            Relationship    Address         Phone

 

                Jasiel Barber    Unavailable     [address] [facility]    [phone]



                                                [place] [address] 

 

                Kristy Luther      Unavailable     [address] [facility] [phone]



                                                [place] [address] 









Care Team Providers







                    Name                Role                Phone

 

                    Yinka Luis F DEQUAN     Primary Care Provider [phone]









Reason for Visit







                          Reason                    Comments

 

                          Results                   







Encounter Details







             Date         Type         Department   Care Team    Description

 

                11/10/2020      Telephone       [facility]- Juan Thompson MD



                                        Results



                                                            [address] [address]



                                        



                                                            [address]



                                        [phone]



                                        



                                                            [place] [address]

[phone] (Fax)        



                                       [phone]              







Allergies

No Known Allergiesdocumented as of this encounter (statuses as of 2020)



Medications







          Medication Sig       Dispensed Refills   Start Date End Date  Status

 

          amitriptyline 25 mg TAKE 1 TABLET BY           3         2018   

        Active



          tablet    MOUTH TWICE A                                         



                    DAY                                               

 

          amLODIPine 10 mg tablet Take 10 mg by           0         2018  

         Active



                    mouth daily.                                         

 

          Insulin Needles, Use as directed. 360 Each  1         2018      

     Active



          Disposable, (MIHIR PEN 4 times daily. E                                

         



          NEEDLE) 32 gauge x 5/32" 11.40                                        

     



          NdleIndications:                                                   



          Uncontrolled type 2                                                   



          diabetes with neuropathy                                              

     

 

          aspirin 81 mg chewable Take 81 mg by           0                      

       Active



          tablet    mouth daily.                                         

 

          gabapentin  mg Take 300 mg by           0                       

      Active



          tablet, extended release mouth daily.                                 

        



          24 hr                                                       

 

          potassium chloride                     0         10/31/2019           

Active



          (KLOR-CON 10) 10 mEq CR                                               

    



          tablet                                                      

 

          losartan-hydrochlorothia                     0         2020     

      Active



          zide 50-12.5 mg per                                                   



          tablet                                                      

 

          furosemide 40 mg tablet                     0         2020      

     Active

 

          insulin aspart U-100 inject 20 Units 54 mL     1         2020   

        Active



          (NOVOLOG FLEXPEN U-100 under the skin 3                               

          



          INSULIN) 100 unit/mL (3 (three) times                                 

        



          mL)       daily before                                         



          injectionIndications: meals. E11.65                                   

      



          Uncontrolled type 2                                                   



          diabetes with neuropathy                                              

     

 

          TRESIBA FLEXTOUCH U-200 inject 60 Units 30 mL     1         2020

           Active



          200 unit/mL (3 mL) under the skin                                     

    



          InPnIndications: every morning.                                       

  



          Uncontrolled type 2 E11.65                                            



          diabetes with neuropathy                                              

     

 

          atorvastatin 20 mg Take 1 tablet by 90 tablet 1         2020    

       Active



          tabletIndications: mouth at                                          



          Dyslipidemia bedtime.                                          



documented as of this encounter (statuses as of 2020)



Active Problems







                          Problem                   Noted Date

 

                          Background diabetic retinopathy of right eye determine

d by examination 

2020

 

                          RADHA (acute kidney injury) 2017

 

                          Obesity (BMI 30-39.9)     2017

 

                          Callus of foot            2017

 

                          Thyromegaly               2017

 

                          Vitamin D deficiency      2016

 

                          Essential hypertension    2016

 

                          Uncontrolled type 2 diabetes with neuropathy 

6

 

                          CKD (chronic kidney disease), stage 3 (moderate) 

 

                          HLD (hyperlipidemia)      2016



documented as of this encounter (statuses as of 2020)



Immunizations







                    Name                Administration Dates Next Due

 

                    Influenza Virus Vaccine Quad .5 mL IM 6+ MO 2020      

    



documented as of this encounter



Social History







             Tobacco Use  Types        Packs/Day    Years Used   Date

 

             Never Smoker                                        









                Smokeless Tobacco: Never Used                                 









                Alcohol Use     Drinks/Week     oz/Week         Comments

 

                No              0 Standard drinks or equivalent 0.0             









                          Sex Assigned at Birth     Date Recorded

 

                          Not on file               









                    COVID-19 Exposure   Response            Date Recorded

 

                    In the last month, have you been in contact with No / Unsure

         11/3/2020  9:47 AM 

CST



                    someone who was confirmed or suspected to have              

       



                    Coronavirus / COVID-19?                     



documented as of this encounter



Last Filed Vital Signs

Not on filedocumented in this encounter



Miscellaneous Notes

Telephone Encounter - Maria Luisa Vaughn RN - 2020 12:35 PM CSTPatient 
called.

Patient informed of providers message.

Patient verbalized understanding and denies any further questions.



Electronically signed by Maria Luisa Vaughn RN at 2020 12:37 PM CST
Telephone Encounter - Maria Luisa Vaughn RN - 2020  8:24 AM CSTPatient 
called.

No answer.

Left voicemail.

Electronically signed by Maria Luisa Vaughn RN at 2020  8:25 AM CST
Telephone Encounter - Juan Thompson MD - 11/10/2020  7:14 PM CSTKidney function 
continue worsening, she had glucose at 606 during blood test was done, secondary
to patient reporting being out of insulin for a month



Her cholesterol was also worsened for same reason



Please remind patient to take Tresiba /novolog consistentlyElectronically signed
by Juan Thompson MD at 11/10/2020  7:16 PM CSTdocumented in this encounter



Plan of Treatment







             Date         Type         Specialty    Care Team    Description

 

                2021      Office Visit    Endocrinology Diabetes & Jose Thompson MD



                                        



                                       Metabolism   [address] [place], [place] 

[address]



                                        



                                                                [phone] (Fax) 









                Health Maintenance Due Date        Last Done       Comments

 

                HEPATITIS C (HCV) SCREEN 1955                      

 

                PNEUMOCOCCAL 0-64 YEARS COMBINED 1963                     

 



                SERIES (1 of 3 - PCV13)                                 

 

                DTaP,Tdap,and Td Vaccines (1 - 1976                      



                Tdap)                                           

 

                PAP SMEAR       1978                      

 

                Breast Cancer Screening 1997                      



                (MAMMOGRAM)                                     

 

                COLON CANCER SCREENING ANNUAL 2007                      



                FIT/FOBT                                        

 

                COLON CANCER SCREENING FIT DNA 2007                      



                EVERY 3 YEARS                                   

 

                COLON CANCER SCREENING 2007                      



                SIGMOIDOSCOPY EVERY 5 YEARS                                 

 

                COLONOSCOPY     2007                      

 

                Colorectal Cancer Screening 2007                      

 

                Zoster Recombinant Vaccine 2007                      



                (SHINGRIX) (1 of 2)                                 

 

                FOOT EXAM       2020, 2019, 



                                                10/30/2018, Additional history 



                                                exists          

 

                HgA1C           2021, 2020, 



                                                2019, Additional history 



                                                exists          

 

                EYE EXAM        2021, 2019 

 

                CREATININE (SERUM) 2021, 2019, 



                                                10/04/2018, Additional history 



                                                exists          

 

                Depression Screening 2021      

 

                LDL-C           2021, 2019, 



                                                2017, Additional history 



                                                exists          

 

                URINE MICROALBUMIN 2021, 2019, 



                                                2017      

 

                INFLUENZA VACCINE Completed       2020      



documented as of this encounter



Implants







          Implanted Type      Area       Device    Shelf     Model /

 Serial



                                                  Identifier Expiration / Lot



                                                            Date      

 

          Lens, Jaime #[id number] - [id number] LENS      Right:    Jaime         

      2022 [id number] /



                                        Implanted: Qty: 1 on 10/25/2018 by Luis Ng MD at [facility]            Eye                                         2

[id number] /



                                                                      [id number]

81

 

          Lens, Jaime #[id number] - [id number] LENS      Left: Eye Jaime          

     2023 SN60WF /



                                        Implanted: Qty: 1 on 2019 by Luis Ng MD at [facility]                                                        2

[id number] /



                                                                      N/A



documented as of this encounter



Results

Not on filedocumented in this encounter



Insurance







          Payer     Benefit Plan Subscriber ID Effective Dates Phone     Address

   Type



                    / Group                                           

 

          [employer] OF   [id number] 2019-Michael [phone] [address]

[address]   [address]



           [place]      - OUT OF              t          [address]          [id number]



                                        



                    [place], [place] 



                                                            [address]     



documented as of this encounter

## 2021-01-23 NOTE — XMS REPORT
Summary of Care

                           Created on:2020



Patient:Josette Faye

Sex:Female

:1955

External Reference #:[id number]





Demographics







                          Address                   [address]



                                                    [place] [address]

 

                          Mobile Phone              [phone]

 

                          Home Phone                [phone]

 

                          Email Address             [email or web address]

 

                          Email Address             [email or web address]

 

                          Preferred Language        English

 

                          Marital Status            Single

 

                          Sikhism Affiliation     Unknown

 

                          Race                      White

 

                          Ethnic Group              Not  or 









Author







                          Organization              [facility]

 

                          Address                   [address] [place] [address]









Support







                Name            Relationship    Address         Phone

 

                Jasiel Barber    Unavailable     [address]    [phone]



                                                [place] [address] 

 

                Kristy Luther      Unavailable     [address] [phone]



                                                [place] [address] 









Care Team Providers







                    Name                Role                Phone

 

                    Luis F Honeycutt     Primary Care Provider [phone]









Reason for Visit







                          Reason                    Comments

 

                          Follow-up                 

 

                          Diabetes Mellitus II      

 

                          LAB                       POCT A1c







Encounter Details







             Date         Type         Department   Care Team    Description

 

                2020      Office Visit    [facility]     Juan Thompson MD



                                        Uncontrolled type 2 diabetes with neurop

athy (Primary Dx);



                                       Endocrinology- [address] [facility] Flu vacc

ine need;



                                                            [facility]



                                        Dyslipidemia;



                                       [address] [place], [place] Stage 3

b chronic kidney disease;



                                                            [facility], [address]



                                        [address]



                                        Essential hypertension, benign



                                                [place], [place]    [phone] [address] [phone] (Fax)        



                                       [phone]              







Allergies

No Known Allergiesdocumented as of this encounter (statuses as of 2020)



Medications







          Medication Sig       Dispensed Refills   Start     End Date  Status



                                                  Date                

 

          amitriptyline 25 TAKE 1 TABLET           3                   

 Active



          mg tablet BY MOUTH TWICE                     8                   



                    A DAY                                             

 

          amLODIPine 10 mg Take 10 mg by           0                   

 Active



          tablet    mouth daily.                     8                   

 

          Insulin Needles, Use as    360 Each  1                    Act

melania



          Disposable, (MIHIR directed. 4                     8                   



          PEN NEEDLE) 32 times daily. E                                         



          gauge x " 11.40                                             



          NdleIndications:                                                   



          Uncontrolled type                                                   



          2 diabetes with                                                   



          neuropathy                                                   

 

          aspirin 81 mg Take 81 mg by           0                             Ac

tive



          chewable tablet mouth daily.                                         

 

          gabapentin  Take 300 mg by           0                          

   Active



          mg tablet, mouth daily.                                         



          extended release                                                   



          24 hr                                                       

 

          potassium chloride                     0         10/31/201           A

ctive



          (KLOR-CON 10) 10                               9                   



          mEq CR tablet                                                   

 

          losartan-hydrochlo                     0                    A

ctive



          rothiazide 50-12.5                               0                   



          mg per tablet                                                   

 

          furosemide 40 mg                     0                    Act

melania



          tablet                                  0                   

 

          insulin aspart inject 20 54 mL     1                    Activ

e



          U-100 (NOVOLOG Units under                     0                   



          FLEXPEN U-100 the skin 3                                         



          INSULIN) 100 (three) times                                         



          unit/mL (3 mL) daily before                                         



          injectionIndicatio meals. E11.65                                      

   



          ns: Uncontrolled                                                   



          type 2 diabetes                                                   



          with neuropathy                                                   

 

          TRESIBA FLEXTOUCH inject 60 30 mL     1                    Ac

tive



          U-200 200 unit/mL Units under                     0                   



          (3 mL)    the skin every                                         



          InPnIndications: morning.                                          



          Uncontrolled type E11.65                                            



          2 diabetes with                                                   



          neuropathy                                                   

 

          atorvastatin 20 mg Take 1 tablet 90 tablet 1                 

   Active



          tabletIndications: by mouth at                     0                  

 



          Dyslipidemia bedtime.                                          

 

          Insulin   Use as    150 Syringe 5         10/12/201 11/03/20  Disconti

nued



          Syringe-Needle directed, BID,                     6         20        



          U-100 (INSULIN Dx;E11.9                                          



          SYRINGE) 1 mL 30                                                   



          gauge x 5/16 Syrg                                                   

 

          atorvastatin 20 mg Take 1 tablet 90 tablet 1         

0  Discontinued



          tabletIndications: by mouth at                     0         20       

 (Reorder)



          Dyslipidemia bedtime.                                          

 

          NOVOLIN 70-30 Take 65 units 36 mL     1         20  Di

scontinued



          FLEXPEN U-100 100 with breakfast                     0         20     

   



          unit/mL (70-30) and 45 units                                         



          injectionIndicatio with dinner                                        

 



          ns: Uncontrolled                                                   



          type 2 diabetes                                                   



          with neuropathy                                                   

 

          TRESIBA FLEXTOUCH                     0         20  Di

scontinued



          U-100 100 unit/mL                               0         20        



          (3 mL) InPn                                                   

 

          NOVOLOG FLEXPEN                     0         20  Disc

ontinued



          U-100 INSULIN 100                               0         20        (R

eorder)



          unit/mL (3 mL)                                                   



          injection                                                   



documented as of this encounter (statuses as of 2020)



Active Problems







                          Problem                   Noted Date

 

                          Background diabetic retinopathy of right eye determine

d by examination 

2020

 

                          RADHA (acute kidney injury) 2017

 

                          Obesity (BMI 30-39.9)     2017

 

                          Callus of foot            2017

 

                          Thyromegaly               2017

 

                          Vitamin D deficiency      2016

 

                          Essential hypertension    2016

 

                          Uncontrolled type 2 diabetes with neuropathy 

6

 

                          CKD (chronic kidney disease), stage 3 (moderate) 

 

                          HLD (hyperlipidemia)      2016



documented as of this encounter (statuses as of 2020)



Immunizations







                    Name                Administration Dates Next Due

 

                    Influenza Virus Vaccine Quad .5 mL IM 6+ MO 2020      

    



documented as of this encounter



Social History







             Tobacco Use  Types        Packs/Day    Years Used   Date

 

             Never Smoker                                        









                Smokeless Tobacco: Never Used                                 









                Alcohol Use     Drinks/Week     oz/Week         Comments

 

                No              0 Standard drinks or equivalent 0.0             









                          Sex Assigned at Birth     Date Recorded

 

                          Not on file               









                    COVID-19 Exposure   Response            Date Recorded

 

                    In the last month, have you been in contact with No / Unsure

         11/3/2020  9:47 AM 

CST



                    someone who was confirmed or suspected to have              

       



                    Coronavirus / COVID-19?                     



documented as of this encounter



Last Filed Vital Signs







                Vital Sign      Reading         Time Taken      Comments

 

                Blood Pressure  166/85          2020 10:07 AM 



                                                CST             

 

                Pulse           90              2020 10:03 AM 



                                                CST             

 

                Temperature     -               -               

 

                Respiratory Rate -               -               

 

                Oxygen Saturation -               -               

 

                Inhaled Oxygen Concentration -               -               

 

                Weight          109.6 kg (241 lb 9.6 oz) 2020 10:03 AM 



                                                CST             

 

                Height          162.6 cm (5' 4") 2020 10:03 AM 



                                                CST             

 

                Body Mass Index 41.47           2020 10:03 AM 



                                                CST             



documented in this encounter



Patient Instructions

Patient InstructionsJuan Thompson MD - 2020 10:00 AM CSTIncrease Tresiba 
to 60 units once at morning daily



Continue Novolog  20 units with breakfast  lunch  And  dinner



Start to take atorvastatin 20 units dailyElectronically signed by Juan Thompson MD at 2020  4:58 PM CST

documented in this encounter



Progress Notes

Juan Thompson MD - 2020 10:00 AM CSTFormatting of this note might be 
different from the original.

chief complaint: Type 2 diabetes mellitus- follow up



HPI



Patient is a 64 year old /White female who is here today for Diabetes 
Mellitus Type 2.  Patient's diabetes is complicated by atherogenic diet, 
hypertension , nephropathy: with chronic kidney disease stage, neuropathy: 
Peripheral and lower extremity, retinopathy and obesity. DM has been poorly
controlled with A1C at 9-14 range



LOV was in 2020 with A1C at 11.3 due to not taking insulin Patient was 
advised to increase Tresiba to 55 units and  Novolog to 20 units TIDAC . She ran
out medication a month ago, BG at 400-500s in past month

Glucose readings: Did not bring. Not checking regularly. Reported fasting 120-
300s when taking Tresiba and Novolog consistently; Denies hypoglycemias

She continue being noncompliant with diet/exercise in recent quarantine. She 
gained 19lbs since LOV



Dyslipidemia: resumed Lipitor 20mg in LOV



CKD stage 4: Patient was following  nephrology.

HTN: Managed by PCP

Neuropathy: Managed by PCP



TPO and TFT's normal in  and again in 2019. States her ophthalmologist 
told her she has thyroid disease.



DIABETIC HEALTH MAINTENANCE

Last Ophthalmology visit was 2020, S/p cataract surgery, +on active treatment
right retinopathy. F/u 6months

Patient on ACE/ARB therapy - No ( Hyperkalemia)

Patient on ASA therapy - No.

Patient on Statin/Fibrate therapy -No

Patient instructed about daily feet exams, last sensation exam was today

Patient has received Nutrition/Diet/Diabetes Education on 2019



Patient's Medications

START taking these medications

 TRESIBA FLEXTOUCH U-200 200 UNIT/ML (3 ML) INPN    inject 60 Units under the 
skin every morning. E11.65

CONTINUE taking these medications which have NOT CHANGED

 AMITRIPTYLINE 25 MG TABLET    TAKE 1 TABLET BY MOUTH TWICE A DAY

 AMLODIPINE 10 MG TABLET    Take 10 mg by mouth daily.

 ASPIRIN 81 MG CHEWABLE TABLET    Take 81 mg by mouth daily.

 FUROSEMIDE 40 MG TABLET

 GABAPENTIN  MG TABLET, EXTENDED RELEASE 24 HR    Take 300 mg by mouth 
daily.

 INSULIN NEEDLES, DISPOSABLE, (MIHIR PEN NEEDLE) 32 GAUGE X 5/32" NDLE    Use as 
directed. 4 times daily. E 11.40

 LOSARTAN-HYDROCHLOROTHIAZIDE 50-12.5 MG PER TABLET

 POTASSIUM CHLORIDE (KLOR-CON 10) 10 MEQ CR TABLET

START taking Modified Medications as Prescribed

 Modified Medication Previous Medication

 ATORVASTATIN 20 MG TABLET atorvastatin 20 mg tablet

    Take 1 tablet by mouth at bedtime.    Take 1 tablet by mouth at bedtime.

 INSULIN ASPART U-100 (NOVOLOG FLEXPEN U-100 INSULIN) 100 UNIT/ML (3 ML) 
INJECTION NOVOLOG FLEXPEN U-100 INSULIN 100 unit/mL (3 mL) injection

    inject 20 Units under the skin 3 (three) times daily before meals. E11.65

STOP taking these medications

 INSULIN SYRINGE-NEEDLE U-100 (INSULIN SYRINGE) 1 ML 30 GAUGE X 5/16 SYRG    Use
as directed, BID, Dx;E11.9

 NOVOLIN 70-30 FLEXPEN U-100 100 UNIT/ML (70-30) INJECTION    Take 65 units with
breakfast and 45 units with dinner

 TRESIBA FLEXTOUCH U-100 100 UNIT/ML (3 ML) INPN



HISTORY

Past Medical History:

Diagnosis Date

 RADHA (acute kidney injury)

 Cataract

 DM2 (diabetes mellitus, type 2)

 Hypertension



Past Surgical History:

Procedure Laterality Date

 CHOLECYSTECTOMY

 ENDOSCOPIC CARPAL TUNNEL RELEASE

 OTHER

 Gastric staple

 PHACOEMULSIFICATION OF CATARACT WITH INTRAOCULAR LENS IMPLANT Right 
10/25/2018

 Surgeon: Luis Ng MD;  Location: [facility]

 PHACOEMULSIFICATION OF CATARACT WITH INTRAOCULAR LENS IMPLANT Left 2019

 Surgeon: Luis Ng MD;  Location: [facility]



Family History

Problem Relation Age of Onset

 Coronary Heart Disease Brother



Social History



Socioeconomic History

 Marital status: Single

  Spouse name: Not on file

 Number of children: Not on file

 Years of education: Not on file

 Highest education level: Not on file

Occupational History

 Not on file

Social Needs

 Financial resource strain: Not on file

 Food insecurity

  Worry: Not on file

  Inability: Not on file

 Transportation needs

  Medical: Not on file

  Non-medical: Not on file

Tobacco Use

 Smoking status: Never Smoker

 Smokeless tobacco: Never Used

Substance and Sexual Activity

 Alcohol use: No

  Alcohol/week: 0.0 standard drinks

 Drug use: No

 Sexual activity: Not on file

Lifestyle

 Physical activity

  Days per week: Not on file

  Minutes per session: Not on file

 Stress: Not on file

Relationships

 Social connections

  Talks on phone: Not on file

  Gets together: Not on file

  Attends Holiness service: Not on file

  Active member of club or organization: Not on file

  Attends meetings of clubs or organizations: Not on file

  Relationship status: Not on file

 Intimate partner violence

  Fear of current or ex partner: Not on file

  Emotionally abused: Not on file

  Physically abused: Not on file

  Forced sexual activity: Not on file

Other Topics Concern

 Not on file

Social History Narrative

 Not on file



REVIEW OF SYSTEMS

Constitutional: + weight gain, + fatigue

Eyes: + blurry vision R&gt;L, right eye almost blind, denies diplopia and denies
pain.

Neck: denies pain, denies swollen glands

Cardiovascular: denies chest pain , denies irregular pulse and denies 
palpitations.

Respiratory: denies dyspnea on exertion and denies shortness of breath.

Gastrointestinal: denies abdominal pain, denies constipation and denies 
diarrhea.

Genitourinary: denies burning and denies dysuria.

Musculoskeletal: denies back pain, denies muscle pain and denies weakness.

Skin: denies dry skin and denies hair changes.

Neuro: + numbness , + tingling and denies tremor.

Psych: negative.

Endocrine: +hyperglycemia  denies intolerance to heat, denies polydipsia, denies
polyphagia



PHYSICAL EXAM



POCT GLU (mg/dL)

Date Value

2019 268 (H)



CREATININE (mg/dL)

Date Value

2020 2.46 (H)



Creatinine, Serum-LC (mg/dL)

Date Value

2016 1.19 (H)



CHOL (mg/dL)

Date Value

2020 318 (H)



Cholesterol, Total-LC (mg/dL)

Date Value

2016 271 (H)



HDL Cholesterol-LC (mg/dL)

Date Value

2016 64



HDL (mg/dL)

Date Value

2020 36 (L)



LDL CHOL (no units)

Date Value

2020

  Comment:

  Unable to calculate LDL due to elevated triglyceride level greater than 400 
mg/dL.



LDL Cholesterol Calc-LC (mg/dL)

Date Value

2016 169 (H)



TRIG (mg/dL)

Date Value

2020 517 (H)



Triglycerides-LC (mg/dL)

Date Value

2016 190 (H)



MICROAL/CR (mg/g of creatinine)

Date Value

2020 802 (H)



POCT HBA1C (%)

Date Value

2020 13.1 (A)

2020 11.5





BP (!) 166/85  | Pulse 90  | Ht 5' 4" (1.626 m)  | Wt 241 lb 9.6 oz (109.6 kg)  
| BMI 41.47 kg/m

General: alert, oriented times three, no apparent distress, appearing age 
appropriate.

Skin: skin color and turgor are normal

Head: normocephalic, no masses, lesions, tenderness or abnormalities.

Eyes: anicteric sclera,

Neck: +acanthosis nigricans

Thyroid: normal size and consistency to palaption

Lungs: good diaphragmatic excursion, lungs clear to auscultation bilaterally.

Heart: regular rate and rhythm, no murmurs, gallops or rubs.

Abdomen: abdomen soft,

Neuro: unremarkable without focal findings.

Extremities/Musculoskeletal: no cyanosis, no edema .

Sensory exam of the foot is normal. Monofilament exam with sensation Right: 5/5,
Left: 5/5. Lesions absent Ulcers Absent Peripheral pulses present 2+.



ASSESSMENT/PLAN



Uncontrolled type 2 diabetes with neuropathy

-A1C (target=6-7%): 9.4 (10/18) --&gt;12.2() 
---&gt;11.3()---&gt;13.2() poor control due to not taking insulin

-glucose range: reports hyperglycemia thru the day

- without hypoglycemia

-complication: neuropathy CKD

-medication limitation: ?metformin cause GI symptoms

-diet: noncompliant

-exercise: limited by joint pain



Plan

-reinterated to check glucose regualrly alternating fasting and 2 hours post 
meals

-urged compliance with diet/exercise

- POCT HEMOGLOBIN A1C TEST

- COMP. METABOLIC PANEL (27166); Future

- CBC WITH DIFF; Future

- THYROID STIMULATING HORMONE; Future

- T4 FREE; Future

- MICROALBUMIN URINE - SPOT SAMPLE; Future

- insulin aspart U-100 (NOVOLOG FLEXPEN U-100 INSULIN) 100 unit/mL (3 mL) 
injection; inject 20 Unitsunder the skin 3 (three) times daily before meals. 
E11.65  Dispense: 54 mL; Refill: 1

- TRESIBA FLEXTOUCH U-200 200 unit/mL (3 mL) InPn; inject 60 Units under the 
skin every morning. E11.65  Dispense: 30 mL; Refill: 1

Patient Instructions

Increase Tresiba to 60 units once at morning daily



Continue Novolog  20 units with breakfast  lunch  And  dinner



Start to take atorvastatin 20 units daily



Flu vaccine need

- FLU VACC(9504-9079), 6+ MONTHS, IM, QUAD (FLUZONE/FLULAVAL/FLUARIX)



Dyslipidemia

High risk

PCP change to atorvastatin since 2019

Plan

- LIPID PANEL (60664)(TOTAL CHOLESTEROL, TRIGLYCERIDES, HDL); Future

- atorvastatin 20 mg tablet; Take 1 tablet by mouth at bedtime.  Dispense: 90 
tablet; Refill: 1



Stage 3b chronic kidney disease

Essential hypertension, benign

Comment: GFR30

Plan:

Advised to follow up with nephrology



Electronically signed by Juan Thompson MD at 2020  5:13 PM CSTdocumented 
in this encounter



Plan of Treatment







             Date         Type         Specialty    Care Team    Description

 

                2021      Office Visit    Endocrinology Diabetes & Jose Thompson MD



                                        



                                       Metabolism   [address] [place], [place] 

[address]



                                        



                                                                [phone] (Fax) 









                Health Maintenance Due Date        Last Done       Comments

 

                HEPATITIS C (HCV) SCREEN 1957                      

 

                PNEUMOCOCCAL 0-64 YEARS COMBINED 1963                     

 



                SERIES (1 of 3 - PCV13)                                 

 

                DTaP,Tdap,and Td Vaccines (1 - 1976                      



                Tdap)                                           

 

                PAP SMEAR       1978                      

 

                Breast Cancer Screening 1997                      



                (MAMMOGRAM)                                     

 

                COLON CANCER SCREENING ANNUAL 2007                      



                FIT/FOBT                                        

 

                COLON CANCER SCREENING FIT DNA 2007                      



                EVERY 3 YEARS                                   

 

                COLON CANCER SCREENING 2007                      



                SIGMOIDOSCOPY EVERY 5 YEARS                                 

 

                COLONOSCOPY     2007                      

 

                Colorectal Cancer Screening 2007                      

 

                Zoster Recombinant Vaccine 2007                      



                (SHINGRIX) (1 of 2)                                 

 

                FOOT EXAM       2020, 2019, 



                                                10/30/2018, Additional history 



                                                exists          

 

                HgA1C           2021, 2020, 



                                                2019, Additional history 



                                                exists          

 

                EYE EXAM        2021, 2019 

 

                CREATININE (SERUM) 2021, 2019, 



                                                10/04/2018, Additional history 



                                                exists          

 

                Depression Screening 2021      

 

                LDL-C           2021, 2019, 



                                                2017, Additional history 



                                                exists          

 

                URINE MICROALBUMIN 2021, 2019, 



                                                2017      

 

                INFLUENZA VACCINE Completed       2020      



documented as of this encounter



Implants







          Implanted Type      Area       Device    Shelf     Model /

 Serial



                                                  Identifier Expiration / Lot



                                                            Date      

 

          Lens, Jaime #[id number] - [id number] LENS      Right:    Jaime         

      2022 SN60WF /



                                        Implanted: Qty: 1 on 10/25/2018 by Luis Ng MD at [facility]            Eye                                         2

[id number] /



                                                                      [id number]

81

 

          Lens, Jaime #Sn60wf - [id number] LENS      Left: Eye Jaime          

     2023 SN60WF /



                                        Implanted: Qty: 1 on 2019 by Luis Ng MD at [facility]                                                        2

[id number] /



                                                                      N/A



documented as of this encounter



Procedures







             Procedure Name Priority     Date/Time    Associated Diagnosis Comme

nts

 

             FLU VACC     Routine      2020 10:10 Flu vaccine need 



             ([id number]), 6+              AM CST                    



             MONTHS, IM, QUAD                                        

 

             POCT HEMOGLOBIN A1C Routine      2020   Uncontrolled type 2 R

esults for this



             TEST                                   diabetes with procedure are 

in



                                                    neuropathy   the results



                                                                 section.



documented in this encounter



Results

MICROALBUMIN URINE - SPOT SAMPLE (2020  1:17 PM CST)





             Component    Value        Ref Range    Performed At Pathologist Sig

nature

 

             CREAT U      34.4         mg/dL        [facility] LABORATORY 



                                                    SERVICES     

 

             MICROALB U   276 (H)      0 - 45 ug/mL [facility] LABORATORY 



                                                    SERVICES     

 

             MICROAL/CR   802 (H)      0 - 30 mg/g of [facility] LABORATORY 



                                       creatinine   SERVICES     









                                        Specimen

 

                                        Urine - URINE, CLEAN CATCH









                          Narrative                 Performed At

 

                                        Normal: <30 mg/g creatinine



                                        [facility] LABORATORY SERVICES



                                        Microalbuminuria: 30 - 299 mg/g creatini

ne



                                        



                          Clinical albuminuria: > 300 mg/g creatinine 









                Performing Organization Address         City/State/Zipcode Phone

 Number

 

                          [facility] LABORATORY SERVICES  CLIA:  [id number]



                          [place] [address]       [phone]



                                [address] [place] [address]                 



LIPID PANEL (64133)(TOTAL CHOLESTEROL, TRIGLYCERIDES, HDL) (2020 11:36 AM 
CST)





             Component    Value        Ref Range    Performed At Pathologist



                                                                 Signature

 

             CHOL         318 (H)      120 - 200    [facility] 



                                       mg/dL        [facility] LABORATORY 

 

             HDL          36 (L)       >50 mg/dL    [facility] LABORATORY 

 

             HDLC RATIO   8.8 (H)      <=4.5        [facility] LABORATORY 

 

             TRIG         517 (H)      30 - 170 mg/dL [facility] LABORATORY 

 

             LDL CHOL     Comment: Unable to              [facility] 



                          calculate LDL due to              HOSPITAL LABORATORY 



                          elevated triglyceride                           



                          level greater than                           



                          400 mg/dL.                             

 

             VLDL         103 (H)      5 - 60 mg/dL [facility] LABORATORY 









                                        Specimen

 

                                        Blood









                Performing Organization Address         City/[facility] Phone

 Number

 

                          [facility] CLIA: [id number]



                          [place] [address]        [phone]



                LABORATORY      [address]                 



T4 FREE (2020 11:36 AM CST)





             Component    Value        Ref Range    Performed At Pathologist Sig

nature

 

             FREE T4      1.36         0.78 - 2.20 ng[facility]

[facility] 



                                                    LABORATORY   









                                        Specimen

 

                                        Blood









                Performing Organization Address         City/[facility] Phone

 Number

 

                          [facility] CLIA: [id number]



                          [place] [address]        [phone]



                LABORATORY      [address]                 



THYROID STIMULATING HORMONE (2020 11:36 AM CST)





             Component    Value        Ref Range    Performed At Pathologist Sig

nature

 

             TSH          3.41         0.45 - 4.70 [facility]IU/L [facility]

[facility] 



                                                    LABORATORY   









                                        Specimen

 

                                        Blood









                Performing Organization Address         City/State/[facility] Phone

 Number

 

                          [facility] CLIA: [id number]



                          [place] [address]        [phone]



                LABORATORY      [address]                 



CBC WITH DIFF (2020 11:36 AM CST)





             Component    Value        Ref Range    Performed At Pathologist Sig

nature

 

             WBC          7.57         4.30 - 11.10 [facility] 



                                       10*3/L     [facility] LABORATORY 

 

             RBC          3.91 (L)     3.93 - 5.25  [facility] 



                                       10*6/L     [facility] LABORATORY 

 

             HGB          10.8 (L)     11.6 - 15.0  [facility] 



                                       g/dL         [facility] LABORATORY 

 

             HCT          32.8 (L)     35.7 - 45.2 % [facility] LABORATORY 

 

             MCV          83.9         80.6 - 95.5 fL [facility] LABORATORY 

 

             MCH          27.6         25.9 - 32.8 pg [facility] LABORATORY 

 

             MCHC         32.9         31.6 - 35.1  [facility] 



                                       g/dL         [facility] LABORATORY 

 

             RDW-SD       45.2         39.0 - 49.9 fL [facility] LABORATORY 

 

             RDW-CV       14.8         12.0 - 15.5 % [facility] LABORATORY 

 

             PLT          335          166 - 358    [facility] 



                                       10*3/L     [facility] LABORATORY 

 

             MPV          11.7         9.5 - 12.9 fL [facility] LABORATORY 

 

             NRBC/100 WBC 0.0          0.0 - 10.0 /100 [facility] 



                                       WBCs         HOSPITAL LABORATORY 

 

             NRBC x10^3   <0.01        10*3/L     [facility] LABORATORY 

 

             GRAN MAT (NEUT) % 63.9         %            [facility] LABORATORY 

 

             IMM GRAN %   0.50         %            [facility] LABORATORY 

 

             LYMPH %      21.7         %            [facility] LABORATORY 

 

             MONO %       7.4          %            [facility] LABORATORY 

 

             EOS %        5.3          %            [facility] LABORATORY 

 

             BASO %       1.2          %            [facility] LABORATORY 

 

             GRAN MAT x10^3(ANC) 4.84         1.88 - 7.09  [facility] 



                                       10*3/uL      HOSPITAL LABORATORY 

 

             IMM GRAN x10^3 0.04         0.00 - 0.06  [facility] 



                                       10*3/uL      HOSPITAL LABORATORY 

 

             LYMPH x10^3  1.64         1.32 - 3.29  [facility] 



                                       10*3/uL      HOSPITAL LABORATORY 

 

             MONO x10^3   0.56         0.33 - 0.92  [facility] 



                                       10*3/uL      HOSPITAL LABORATORY 

 

             EOS x10^3    0.40 (H)     0.03 - 0.39  [facility] 



                                       10*3/uL      HOSPITAL LABORATORY 

 

             BASO x10^3   0.09 (H)     0.01 - 0.07  [facility] 



                                       10*3/uL      HOSPITAL LABORATORY 









                                        Specimen

 

                                        Blood









                Performing Organization Address         City/State/Zipcode Phone

 Number

 

                          [facility] CLIA: [id number]



                          [place] [address]        [phone]



                LABORATORY      [address]                 



COMP. METABOLIC PANEL (27588) (2020 11:36 AM CST)





             Component    Value        Ref Range    Performed At Pathologist



                                                                 Signature

 

             NA           126 (L)      135 - 145    [facility] 



                                       mmol/L       [facility] LABORATORY 

 

             K            4.6          3.5 - 5.0    [facility] 



                                       mmol/L       [facility] LAB[facility] 

 

             CL           87 (L)       98 - 108 mmol/L [facility] LAB[facility]ATORY 

 

             CO2 TOTAL    26           23 - 31 mmol/L [facility] LABORATORY 

 

             AGAP         13           2 - 16       [facility] LABORATORY 

 

             BUN          40 (H)       7 - 23 mg/dL [facility] LABORATORY 

 

             GLUCOSE      606 (HH)     70 - 110 mg/dL [facility] LABORATORY 

 

             CREATININE   2.46 (H)     0.50 - 1.04  [facility] 



                                       mg/dL        [facility] LABORAT[facility] 

 

             TOTAL BILI   0.7          0.1 - 1.1 mg/dL [facility] LABORATORY 

 

             CALCIUM      9.3          8.6 - 10.6   [facility] 



                                       mg/dL        [facility] LABORAT[facility] 

 

             T PROTEIN    7.1          6.3 - 8.2 g/dL [facility] 

 

             ALBUMIN      3.9          3.5 - 5.0 g/dL [facility] 

 

             ALK PHOS     118          34 - 122 U/L [facility] LAB[facility]AT[facility] 

 

             ALTv         13           5 - 35 U/L   [facility] LABORATORY 

 

             AST(SGOT)    17           13 - 40 U/L  [facility] 

 

             eGFR Calculation 19.9         mL/min/1.73m2 [facility] 



             (Non-[facility] LABORATORY 



             American)                                           

 

             eGFR Calculation 24.1         mL/min/1.73m2 [facility] 



             (African American)                           [facility] LABORATORY 









                                        Specimen

 

                                        Blood









                          Narrative                 Performed At

 

                          [facility] Glomerular Filtration Rate (GFR) [facility] 

LABORATORY



                                        and Staging of Kidney Disease*



                                        



                                         



                                        



                          +-----------------------+---------------------+- 



                                        ------------------------+



                                        



                          | GFR (mL/min/1.73 m2) | With Kidney Damage 



                                        | Without Kidney Damage



                                        



                          +-----------------------+---------------------+- 



                                        ------------------------+



                                        



                          | >90         | Stage one 



                              |  Normal        



                                        



                                        



                          +-----------------------+---------------------+- 



                                        ------------------------+



                                        



                          | 60-89        | Stage two 



                                            |  Decreased GFR   

  



                                        



                          +-----------------------+---------------------+- 



                                        ------------------------+



                                        



                          | 30-59        | Stage three 



                                           |  Stage three     

 



                                        



                          +-----------------------+---------------------+- 



                                        ------------------------+



                                        



                          | 15-29        | Stage four 



                                            |  Stage four     

 



                                        



                          +-----------------------+---------------------+- 



                                        ------------------------+



                                        



                          | <15 (or dialysis)  | Stage five   



                                          |  Stage five      



                                        



                          +-----------------------+---------------------+- 



                                        ------------------------+



                                        



                                         



                                        



                          *Each stage assumes the associated GFR level has 



                          been in effect for at least three months. 



                          Stages 1 to 5, with or without kidney disease, 



                                        indicate chronic kidney disease.



                                        



                                         



                                        



                          Notes: Determination of stages one and two (with 



                          eGFR >59mL/min/1.73 m2) requires estimation of 



                          kidney damage for at least three months as 



                          defined by structural or functional 



                          abnormalities of the kidney, manifested by 



                                        either:



                                        



                          Pathological abnormalities or Markers of kidney 



                          damage (including abnormalities in the 



                          composition of the blood or urine or 



                                        abnormalities in imaging tests). 



                                        



                                                    









                Performing Organization Address         City/State/Zipcode Phone

 Number

 

                          [facility] CLIA: [id number]



                          [place], [place] [address] [phone]



                LABORATORY      [address]                 



POCT HEMOGLOBIN A1C TEST (2020)





             Component    Value        Ref Range    Performed At Pathologist Sig

nature

 

             POCT HBA1C   13.1 (A)     4 - 6 %                   









                                        Specimen

 

                                        Blood - CAPILLARY



documented in this encounter



Visit Diagnoses







                                        Diagnosis

 

                                        Uncontrolled type 2 diabetes with neurop

athy - Primary







                                        Type II or unspecified type diabetes villa

litus with neurological manifestations,



                                        uncontrolled

 

                                        Flu vaccine need







                                        Need for prophylactic vaccination and in

oculation against influenza

 

                                        Dyslipidemia







                                        Other and unspecified hyperlipidemia

 

                                        Stage 3b chronic kidney disease

 

                                        Essential hypertension, benign



documented in this encounter



Insurance







          Payer     Benefit Plan Subscriber ID Effective Dates Phone     Address

   Type



                    / Group                                           

 

          [id number] [phone] [id number]

[address]   PP[place]



           [place]      - OUT OF              t          7          [id number]



                                        



                    [place], [place] 



                                                            [address]     









           Guarantor Name Account Type Relation to Date of Birth Phone      Bill

ing



                                 Patient                          Address

 

           Josette Faye Personal/Family Self       1955 [phone]



                                                       (Home)     [address]







                                                                  [place], [place]



                                                                  [address]



documented as of this encounter

## 2021-01-23 NOTE — RAD REPORT
EXAM DESCRIPTION:  [place] Single View1/23/2021 8:52 pm

 

CLINICAL HISTORY:  Shortness breath

 

COMPARISON:  2019

 

FINDINGS:  The lungs are hazy. The heart is normal size

 

IMPRESSION:  The lungs are hazy probably secondary to bilateral infiltrates.

## 2021-01-23 NOTE — EDPHYS
Physician Documentation                                                                           

 [facility]                                                                 

Name: Josette Faye                                                                                 

Age: 65 yrs                                                                                       

Sex: Female                                                                                       

: 1955                                                                                   

MRN: [id number]                                                                                   

Arrival Date: 2021                                                                          

Time: 19:35                                                                                       

Account#: [id number]                                                                            

Bed 4                                                                                             

Private MD: Luis F Honeycutt                                                                         

ED Physician Nikos Trent                                                                      

HPI:                                                                                              

                                                                                             

20:39 This 65 yrs old  Female presents to ER via Ambulatory with complaints of       mh7 

      Breathing Difficulty, Cough, Chest Tightness.                                               

20:39 The patient has shortness of breath at rest, with light activity. Onset: The            mh7 

      symptoms/episode began/occurred yesterday. Duration: The symptoms are intermittent,         

      with no pattern. The patient's shortness of breath is aggravated by coughing, light         

      activity, is alleviated by sitting up. Associated signs and symptoms: Pertinent             

      positives: chest pain, non-productive cough, Pertinent negatives: productive cough,         

      diaphoresis, dizziness, fever, hemoptysis, loss of consciousness, nausea, numbness in       

      extremities, visual changes, vomiting. Severity of symptoms: At their worst the             

      symptoms were moderate last night, in the emergency department the symptoms have            

      improved moderately.                                                                        

                                                                                                  

Historical:                                                                                       

- Allergies:                                                                                      

20:02 No Known Allergies;                                                                     ca1 

- PMHx:                                                                                           

20:02 Hypertension; Diabetes - IDDM;                                                          ca1 

- PSHx:                                                                                           

20:02 Carpal Tunnel Repair; Tubal ligation; Cholecystectomy; Tonsillectomy; stomach staple;   ca1 

                                                                                                  

- Immunization history:: Flu vaccine is up to date.                                               

- Social history:: Smoking status: Patient denies any tobacco usage or history of.                

                                                                                                  

                                                                                                  

ROS:                                                                                              

20:39 Constitutional: Negative for fever, chills, and weight loss, Eyes: Negative for injury, mh7 

      pain, redness, and discharge, ENT: Negative for injury, pain, and discharge, Neck:          

      Negative for injury, pain, and swelling, Abdomen/GI: Negative for abdominal pain,           

      nausea, vomiting, diarrhea, and constipation, Back: Negative for injury and pain, :       

      Negative for injury, bleeding, discharge, and swelling, MS/Extremity: Negative for          

      injury and deformity, Skin: Negative for injury, rash, and discoloration, Neuro:            

      Negative for headache, weakness, numbness, tingling, and seizure, Psych: Negative for       

      depression, anxiety, suicide ideation, homicidal ideation, and hallucinations,              

      Allergy/Immunology: Negative for hives, rash, and allergies, Endocrine: Negative for        

      neck swelling, polydipsia, polyuria, polyphagia, and marked weight changes,                 

      Hematologic/Lymphatic: Negative for swollen nodes, abnormal bleeding, and unusual           

      bruising.                                                                                   

                                                                                                  

Exam:                                                                                             

20:39 Constitutional:  This is a well developed, well nourished patient who is awake, alert,  mh7 

      and in no acute distress. Head/Face:  Normocephalic, atraumatic. Eyes:  Pupils equal        

      round and reactive to light, extra-ocular motions intact.  Lids and lashes normal.          

      Conjunctiva and sclera are non-icteric and not injected.  Cornea within normal limits.      

      Periorbital areas with no swelling, redness, or edema. Neck:  Trachea midline, no           

      thyromegaly or masses palpated, and no cervical lymphadenopathy.  Supple, full range of     

      motion without nuchal rigidity, or vertebral point tenderness.  No Meningismus.             

      Chest/axilla:  Normal chest wall appearance and motion.  Nontender with no deformity.       

      No lesions are appreciated. Cardiovascular:  Regular rate and rhythm with a normal S1       

      and S2.  No gallops, murmurs, or rubs.  Normal PMI, no JVD.  No pulse deficits.             

      Respiratory:  Lungs have equal breath sounds bilaterally, clear to auscultation and         

      percussion.  No rales, rhonchi or wheezes noted.  No increased work of breathing, no        

      retractions or nasal flaring. Abdomen/GI:  Soft, non-tender, with normal bowel sounds.      

      No distension or tympany.  No guarding or rebound.  No evidence of tenderness               

      throughout. Back:  No spinal tenderness.  No costovertebral tenderness.  Full range of      

      motion. Skin:  Warm, dry with normal turgor.  Normal color with no rashes, no lesions,      

      and no evidence of cellulitis. MS/ Extremity:  Pulses equal, no cyanosis.                   

      Neurovascular intact.  Full, normal range of motion. Neuro:  Awake and alert, GCS 15,       

      oriented to person, place, time, and situation.  Cranial nerves II-XII grossly intact.      

      Motor strength 5/5 in all extremities.  Sensory grossly intact.  Cerebellar exam            

      normal.  Normal gait. Psych:  Awake, alert, with orientation to person, place and time.     

       Behavior, mood, and affect are within normal limits.                                       

                                                                                                  

Vital Signs:                                                                                      

19:59  / 114; Pulse 99; Resp 20; Temp 97.8(TE); Pulse Ox 99% on R/A; Weight 112.04 kg   ca1 

      (R); Height 5 ft. 4 in. (162.56 cm) (R); Pain 4/10;                                         

21:01  / 100; Pulse 94; Resp 19; Pulse Ox 99% ;                                         ea  

23:31  / 93; Pulse 90; Resp 18; Pulse Ox 98% on R/A;                                    ea  

19:59 Body Mass Index 42.40 (112.04 kg, 162.56 cm)                                            ca1 

                                                                                                  

MDM:                                                                                              

22:46 Differential diagnosis: Anemia Anxiety Reaction asthma, Bronchitis CHF exacerbation,    7 

      Chronic Obstructive Pulmonary Disease Myocardial Infarction pneumonia, Pneumothorax         

      pulmonary edema, reactive airway disease. Data reviewed: vital signs, nurses notes, lab     

      test result(s), cardiac enzymes, CBC, electrolytes, urinalysis, EKG, radiologic             

      studies, plain films. Data interpreted: Pulse oximetry: on room air is 99 %.                

      Interpretation: normal. Counseling: I had a detailed discussion with the patient and/or     

      guardian regarding: the historical points, exam findings, and any diagnostic results        

      supporting the discharge/admit diagnosis, the presence of at least one elevated blood       

      pressure reading (>120/80) during this emergency department visit, lab results,             

      radiology results, the need for further work-up and treatment in the hospital. Response     

      to treatment: the patient's symptoms have mildly improved after treatment.                  

22:49 Patient medically screened.                                                             [facility] 

                                                                                                  

                                                                                             

20:13 Order name: Basic Metabolic Panel                                                       [facility] 

                                                                                             

20:13 Order name: CBC with Diff                                                               [facility] 

                                                                                             

20:13 Order name: LFT's                                                                       [facility] 

                                                                                             

20:13 Order name: Magnesium; Complete Time: 21:53                                             [facility] 

                                                                                             

20:13 Order name: NT PRO-BNP; Complete Time: 21:53                                            [facility] 

                                                                                             

20:13 Order name: PT-INR; Complete Time: 20:45                                                [facility] 

                                                                                             

20:13 Order name: Troponin (emerg Dept Use Only); Complete Time: 21:53                        [facility] 

                                                                                             

20:13 Order name: Blood Culture Adult (2)                                                     [facility] 

                                                                                             

20:14 Order name: Basic Metabolic Panel; Complete Time: 21:53                                 [facility]

                                                                                             

20:14 Order name: CBC with Automated Diff; Complete Time: 20:45                               [facility]

                                                                                             

20:14 Order name: Liver (Hepatic) Function; Complete Time: 21:53                              [facility]

                                                                                             

22:04 Order name: COVID-19/FLU A+B; Complete Time: 22:12                                      [facility]

                                                                                             

20:13 Order name: XRAY Chest (1 view); Complete Time: 21:53                                   7 

                                                                                             

20:13 Order name: EKG; Complete Time: 20:14                                                   [facility] 

                                                                                             

20:13 Order name: Cardiac monitoring; Complete Time: :                                    [facility] 

                                                                                             

20:13 Order name: EKG - Nurse/Tech; Complete Time: :                                      [facility] 

                                                                                             

20:13 Order name: IV Saline Lock; Complete Time: :                                        [facility] 

                                                                                             

20:13 Order name: Labs collected and sent; Complete Time: :                               [facility] 

                                                                                             

20:13 Order name: O2 Per Protocol; Complete Time:                                        [facility] 

                                                                                             

20:13 Order name: O2 Sat Monitoring; Complete Time: :                                     7 

                                                                                                  

Administered Medications:                                                                         

22:40 Drug: Rocephin - (cefTRIAXone) 1 grams Route: IVPB; Infused Over: 30 mins; Site: right  ea  

      antecubital;                                                                                

23:30 Follow up: Response: No adverse reaction; IV Status: Completed infusion; IV Intake: 10mlea  

22:47 Not Given (Other Intervention Used): Albuterol 2.5 mg Inhalation once                   ea  

22:48 Drug: Lasix 40 mg Route: IVP; Site: right antecubital;                                  ea  

23:32 Follow up: Response: No adverse reaction                                                ea  

23:11 Drug: Zithromax 500 mg Route: IVPB; Infused Over: 1 hrs; Site: right antecubital;       ea  

23:41 Follow up: Response: No adverse reaction; IV Status: Infusion continued upon admission  ea  

                                                                                                  

                                                                                                  

Disposition:                                                                                      

21 22:49 Hospitalization ordered by Holland Yan for Inpatient Admission.               

  Preliminary diagnosis is Congestive Heart failure.                                              

- Bed requested for Telemetry/MedSurg (Inpatient).                                                

- Status is Inpatient Admission.                                                              ea  

- Condition is Stable.                                                                            

- Problem is new.                                                                                 

- Symptoms have improved.                                                                         

                                                                                                  

                                                                                                  

                                                                                                  

Signatures:                                                                                       

Dispatcher [employer]                           Sonja Pereira RN RN cg Antunez, Elena, RN RN ea Acob, Cheryl, RN RN ca1 Holmes, Maurice, MD MD   7                                                  

                                                                                                  

Corrections: (The following items were deleted from the chart)                                    

21:12 20:14 CORONAVIRUS+MR.LAB.BRZ ordered. EDMS                                              EDMS

21:13 20:14 Influenza Screen (A \T\ B)+BA.LAB.BRZ ordered. EDMS                                 EDMS

23:20 22:49 Hospitalization Ordered by Holland Yan MD for Inpatient Admission.            cg  

      Preliminary diagnosis is Congestive Heart failure. Bed requested for Telemetry/MedSurg      

      (Inpatient). Status is Inpatient Admission. Condition is Stable. Problem is new.            

      Symptoms have improved. 7                                                                 

23:34 23:20 2021 22:49 Hospitalization Ordered by Holland Yan MD for Inpatient      cg  

      Admission. Preliminary diagnosis is Congestive Heart failure. Bed requested for             

      Telemetry/MedSurg (Inpatient). Status is Inpatient Admission. Condition is Stable.          

      Problem is new. Symptoms have improved. cg                                                  

23:42 23:34 2021 22:49 Hospitalization Ordered by Holland Yan MD for Inpatient      ea  

      Admission. Preliminary diagnosis is Congestive Heart failure. Bed requested for             

      Telemetry/MedSurg (Inpatient). Status is Inpatient Admission. Condition is Stable.          

      Problem is new. Symptoms have improved. cg                                                  

                                                                                                  

**************************************************************************************************

## 2021-01-24 LAB
BUN BLD-MCNC: 21 MG/DL (ref 7–18)
GLUCOSE SERPLBLD-MCNC: 76 MG/DL (ref 74–106)
HCT VFR BLD CALC: 32.1 % (ref 36–45)
HDLC SERPL-MCNC: 40 MG/DL (ref 40–60)
LDLC SERPL CALC-MCNC: 102 MG/DL (ref ?–130)
LYMPHOCYTES # SPEC AUTO: 2.4 K/UL (ref 0.7–4.9)
MAGNESIUM SERPL-MCNC: 2.1 MG/DL (ref 1.8–2.4)
PMV BLD: 9.1 FL (ref 7.6–11.3)
POTASSIUM SERPL-SCNC: 3 MMOL/L (ref 3.5–5.1)
RBC # BLD: 3.83 M/UL (ref 3.86–4.86)
TROPONIN I: < 0.02 NG/ML (ref 0–0.04)
TSH SERPL DL<=0.05 MIU/L-ACNC: 3.26 UIU/ML (ref 0.36–3.74)

## 2021-01-24 RX ADMIN — FUROSEMIDE SCH MG: 40 TABLET ORAL at 17:09

## 2021-01-24 RX ADMIN — HYDROCODONE BITARTRATE AND ACETAMINOPHEN PRN TAB: 5; 325 TABLET ORAL at 22:39

## 2021-01-24 RX ADMIN — HEPARIN SODIUM SCH UNIT: 5000 INJECTION, SOLUTION INTRAVENOUS; SUBCUTANEOUS at 20:24

## 2021-01-24 RX ADMIN — HUMAN INSULIN SCH: 100 INJECTION, SOLUTION SUBCUTANEOUS at 16:26

## 2021-01-24 RX ADMIN — GABAPENTIN SCH MG: 300 CAPSULE ORAL at 09:47

## 2021-01-24 RX ADMIN — HEPARIN SODIUM SCH UNIT: 5000 INJECTION, SOLUTION INTRAVENOUS; SUBCUTANEOUS at 09:15

## 2021-01-24 RX ADMIN — Medication SCH ML: at 09:16

## 2021-01-24 RX ADMIN — Medication SCH ML: at 20:25

## 2021-01-24 RX ADMIN — HUMAN INSULIN SCH UNIT: 100 INJECTION, SOLUTION SUBCUTANEOUS at 20:25

## 2021-01-24 RX ADMIN — HUMAN INSULIN SCH: 100 INJECTION, SOLUTION SUBCUTANEOUS at 07:30

## 2021-01-24 RX ADMIN — HUMAN INSULIN SCH UNIT: 100 INJECTION, SOLUTION SUBCUTANEOUS at 11:23

## 2021-01-24 NOTE — CON
Date of Consultation:  01/24/2021



Reason For Consultation:  Shortness of breath, suspected heart failure.



History Of Present Illness:  A 65-year-old female with history diabetes, hypertension, chronic kidney
 disease, who presented to the emergency room with shortness of breath with orthopnea and chest tight
ness.  She claimed that about 2 nights ago she woke up from sleep due to shortness of breath and coul
d not sleep well due to that.  Has been having shortness of breath on minimal efforts since.  She cla
imed that she has been gaining weight for the past few weeks and it is getting gradually worse and sh
e has significant lower extremity edema bilaterally.  The patient is not aware of any cardiac history
.  No history of coronary artery disease or congestive heart failure that she reports.  At the presen
t time, she does not have any chest pain.  She feels better after diuresis.



Past Medical History:  As outlined above in HPI.



Medications:  Refer to reconciliation sheet for detailed list.



Allergies:  NO KNOWN DRUG ALLERGIES.



Family History:  No premature coronary artery disease or cancer.



Social History:  Does not smoke or drink.  Does not use any drugs.



Review of Systems:

All systems reviewed and they were negative except for what mentioned in the HPI.



Physical Examination:

Vital Signs:  Temperature is 97.9, pulse 94, breathing at 18, blood pressure is 201/85, saturations 9
1%. 

General:  This is a middle-aged female, overweight, no apparent distress. 

Head and Neck:  Pupils are equal, reactive to light.  Intact eye movements.  No JVD.  No cervical lym
phadenopathy. 

Neck:  Supple.  Thyroid is not enlarged. 

Lungs:  Clear to auscultation bilaterally.  No rhonchi, rales, or crackles.  No accessory muscle use.
 

Heart:  Regular rate and rhythm.  No extra sounds. 

Abdomen:  Soft, nontender.  Bowel sounds positive.  No organomegaly.  No masses or hernia.  No rigidi
ty or rebound. 

Extremities:  Edema bilaterally, 1 to 2+.  No clubbing or cyanosis.  Intact pulses. 

Skin:  No rash noted. 

Neurologic:  Alert, awake, oriented x3.  No acute focal deficits appreciated.



Investigations:  Troponin less than 0.02.  Creatinine 1.86.  Potassium is 3.0.  NT-proBNP was 1767 on
 admission.  Chest x-ray suggestive of congestive heart failure.



Assessment And Recommendations:  

1.Shortness of breath with symptoms and signs suggestive of congestive heart failure.  Agree with IV
 diuresis; however, Lasix dose should probably be increased to 80 mg IV q.12 hours and please replace
 potassium carefully monitoring the BUN, creatinine, and electrolytes.

2.Hypertensive crisis.  Blood pressure is extremely elevated.  We will recommend aggressive lowering
 of her blood pressure to decrease afterload.  If it does not respond to oral medications, IV nitro d
rip would be appropriate.  Please increase the Coreg and maximize it as tolerated and switch the Lasi
x to IV as above.  Please obtain an echocardiogram to further evaluate the heart function and further
 plan accordingly. 

Thank you for the consult.





/SHAMA

DD:  01/24/2021 14:54:58Voice ID:  [id number]

DT:  01/24/2021 15:28:56Report ID:  [id number]

## 2021-01-24 NOTE — CON
History Of Present Illness:  The patient is a pleasant 65-year-old female, who presented to the [facility] with some vague chest pain that was pressure like.  It did not radiate to her neck or her arms.  
She states she did not have any nausea with it.  However, she did have some shortness of breath.  The
 patient states she has been noticing some more swelling in her lower extremities.  She also has been
 getting a little bit more short of breath than her usual.  She has not been monitoring her blood pre
ssure regularly.  She is not sure about her medications.  She states that she has a medication name A
ldactone or spironolactone that she has had for a while but has not been taking it and does not know 
who prescribed it or when it was prescribed or why it was prescribed.  She states that she has been a
dvised to take Lasix 20 mg, which on occasion she takes 2 pills in the morning.  But there are days w
hen she misses that on the day of admission, and the days prior.  She had not been taking it regularl
y.  She also states that she had been on losartan in the past but not sure if she is on it now.  She 
had been on amlodipine while back but has not taken that medication in a long time.  She states when 
she was on 10 mg of that medication, she was developing more swelling in the lower extremities and wh
en that medication was stopped, the swelling went away.  She has been on metformin in the past but st
ates that that medication had been stopped, does not know exactly when and why it was stopped.  The p
atient is not clear if she has been evaluated by the cardiologist anytime recently.  She states she h
as had a kidney evaluation done by Dr. Mcbride and follows with Dr. Mcbride for her kidney tests.  
She has followed up with Dr. Honeycutt's group.  She sees Munira Hebert over there but has not followed u
p over there in some time also and clearly has not been following regularly for her visits.  The kenzie
ent says on the day of admission, she was feeling more short of breath.  She felt her swelling was a 
little bit more in the legs and had come in.  She feels better now.  The blood pressures are elevated
 at last blood pressure being at 190/84.  The one after that, this morning was 201/85.  Her pulse has
 also been in the 90 range.



Allergies:  SHE DENIES ANY ALLERGIES TO ANY MEDICATIONS.



Social History:  She lives with her .  She does not have any issues with safety and feels comf
ortable.  She seems to be in good mood.  She denies drinking alcohol in excess.  Does not have a hist
ory of drug abuse.  She denies any smoking history also.



Family History:  Noncontributory.  Mother did have hypertension and diabetes.  So did her brother and
 sister had diabetes.  Mother also had hypertension along with diabetes.



Past Medical History:  Significant for hypertension, diabetes 2, insulin dependent.  She takes Tresib
a.  Chronic kidney disease.  Not sure the stage, likely stage 3-4.  The patient has had gastric bypas
s in the past.  She has had a cholecystectomy in the past.



Medications:  Reviewed.  Even though her home medications are listed as amlodipine, gabapentin, metfo
rmin, insulin, magnesium, metronidazole, not sure if the patient is taking all these medications or t
aking them as directed.  She admits that she has been taking them differently.  She admits that she h
as not been on amlodipine for some time.  Not been on metformin for some time.  Also, she says that s
he takes gabapentin 800 mg pills quite frequently and sometimes she ends up taking up to 6 pills in a
 day.



Physical Examination:

Vital Signs:  The patient's vitals are stable except for the blood pressure being elevated.  Pulse is
 in the 90 range, blood pressure is 201/85, respirations are about 14-15.  She is comfortable.  O2 sa
ts were in 97% range on room air.  

Lungs:  Clear to auscultation anteriorly with a few crackles at the very bases.  Abdomen:  Soft.  

Extremities:  Revealed edema bilaterally.  Most of it is nonpitting.



Medications:  Current medications reviewed.



Laboratory Data:  Lab data reviewed.  Her WBC count today is 10.7, hemoglobin 10.7, hematocrit 32.1. 
 Platelet count is 330.  Chemistries show sodium 144, potassium 3.0, chloride 110, bicarb is 28, BUN 
is 21, creatinine is 1.86.  Calcium 8.6, magnesium 2.1.  Cholesterol with total cholesterol 184, HDL 
40, TSH is 3.2.  HDL was 40 and LDL was 102.  Her COVID test is negative with RT PCR RNA.  Her influe
nza type A and B are also negative.



Assessment And Plan:  

1.The patient with some volume overload.  Question congestive heart failure.  Presents to the hospit
al with shortness of breath and atypical chest pain.  No radiation to jaw or arms, but she did have a
 pressure-like pain that was accompanied by shortness of breath that has improved with her taking the
 diuretics.  The patient has been noncompliant with her medications and but is willing to and motivat
ed to take it properly so that she can improve her condition.  Reviewed her medications in detail wit
h her.  The patient has not had a Cardiology evaluation in a while.  She states several years ago she
 was seen by Dr. Welch and was evaluated at that time.  The patient has a Cardiology consult pending
 in the hospital.  At this point, I agree with Cardiology evaluation to see if there is any room for 
improving her coronary artery disease status and if there is any need for evaluation for this and mehul
atment as needed, intervention as needed as she may have some coronary artery disease leading to her 
congestion.  Her congestion somewhat may also be related to some of the medications she is taking.  S
he seems to be on a very high dose of gabapentin and also had a problem with amlodipine and the probl
em with lower extremity edema.  We will cut back on amlodipine to 5 mg with possibility of stopping i
t in the future if it can be done safely.  Given the patient's blood pressures are high, other medica
tions would need to be substituted.

2.I have advised the patient about safe use of medications and risks with taking as much gabapentin 
and taking it not as prescribed.  The patient seems to understand.  She will be careful going forward
.  At this point, she is only on 300 mg of gabapentin once a day in the hospital and even that can be
 tapered down in the future as tolerated.  If the patient has neuropathic pain, that needs addressing
.  She may try Lyrica or Cymbalta in the future on an outpatient basis.

3.Discontinue metformin.  The patient has not been on metformin for several months now and given her
 acute renal failure and possibility of requiring cardiac evaluation with testing, we will go ahead a
nd stop the metformin.  The patient should continue her insulin and monitor her sugars.  The patient 
has not been monitoring fingerstick sugars at home.  Has been advised to do this and keep a record at
 least 2 or 3 times a day before meals and take it to her primary care physician.

4.Hypertension.  The patient's blood pressure has been high.  We will go ahead and cut back on amlod
ipine given the fact that she has had a problem with that in the past and foot swelling.  This may ne
ed to be further adjusted in the future.  We will go ahead and start the patient on Lasix 40 mg twice
 a day.  She was taking 20-40 mg a day on an irregular basis in the past.  Given her increased volume
 overload right now, 40 mg b.i.d. should be helpful.  She will likely need to be on a twice a day reg
imen, initially once she is discharged from the hospital.

5.Aldactone.  Start Aldactone 25 mg p.o. daily and also start Coreg 3.125 mg p.o. b.i.d.  Further me
dication adjustments as per Cardiology also.  If the patient tolerates Coreg well and the blood press
ure is still elevated, may consider adding some hydralazine in the future also as we cut back on amlo
dipine.  She will need blood pressure medication adjustments and diabetes medication adjustments on a
n outpatient basis.  I have explained to her in detail about her diet, about keeping her activity john
quate with fall precautions and also make sure she has clearance from the cardiologist before escalat
ing her activity status.  I have also discussed her diet with her regarding salt restriction.  I have
 discussed the benefits of medications like aldactone, which can bring the potassium slightly up but 
that also can be beneficial to the cardiac and her kidney status.  I have also advised her to avoid a
ny over-the-counter pain medications including any NSAIDs and to make sure she is clearing her medica
tions and reviewing them with her primary care physician and also with Dr. Mcbride, her primary neph
rologist.  I have advised the patient to bring her fingerstick glucose readings, her blood pressure r
eadings and all of her medications to appointment with Munira Hebert at Dr. Honeycutt's office where she 
sees Munira Hebert, the nurse practitioner, and also to bring all this information and her medications
 to Dr. Mcbride for her evaluation with Dr. Mcbride.  I have suggested that she follow up with both
 her primary care team and with Dr. Mcbride within a week of her discharge.  Her medications and com
pliance can be monitored and further lab work can be done to assess the residual renal function and a
lso to adjust her medications accordingly.





MD/SHAMA

DD:  01/24/2021 14:36:47Voice ID:  [id number]

DT:  01/24/2021 16:16:02Report ID:  [id number]

## 2021-01-24 NOTE — RAD REPORT
EXAM DESCRIPTION:  US - Renal Ultrasound-Complete - 1/24/2021 6:27 pm

 

CLINICAL HISTORY:  RADHA/CKD

 

COMPARISON:  Abdomen ultrasound March 2015

 

FINDINGS:  The right kidney measures 10.1 x 4.9 x 4.2 cm.  The left kidney measures 9.9 x 4.5 x 4.5 c
m. Renal cortical thickness and echogenicity are normal. No hydronephrosis or suspicious renal mass.

 

No bladder wall thickening or mass. No intraluminal stone or mass.

 

 

IMPRESSION:  No hydronephrosis or suspicious renal mass.

 

No other significant findings.

## 2021-01-24 NOTE — P.PN
Subjective


Date of Service: 01/24/21


Primary Care Provider: Dr. Honeycutt


Chief Complaint: Dyspnea, chest pain


Patient is 65 years of age multiple medical problems admitted with chest pain 

she has had intermittent chest pains before with no radiation has never smoked 

he woke up with shortness of breath with no pain right now











Review of Systems


General: Weakness


Respiratory: Shortness of Breath





Physical Examination





- Vital Signs


Temperature: 97.9 F


Blood Pressure: 201/85


Pulse: 94


Respirations: 18


Pulse Ox (%): 91





- Physical Exam


General: Alert, In no apparent distress, Oriented x3


Neck: Supple


Respiratory: Clear to auscultation bilaterally


Cardiovascular: No edema, Normal S1 S2


Gastrointestinal: Normal bowel sounds, Soft and benign





- Studies


Laboratory Data (last 24 hrs)





01/23/21 20:15: PT 11.2, INR 0.95


01/23/21 20:15: WBC 8.9, Hgb 10.9 L, Hct 32.7 L, Plt Count 319


01/23/21 20:15: Sodium 143, Potassium 3.3 L, BUN 23 H, Creatinine 1.82 H, 

Glucose 148 H, Magnesium 2.1, Total Bilirubin 0.5, AST 15, ALT 13, Alkaline 

Phosphatase 78








Assessment & Plan





- Problems (Diagnosis)


(1) Chest pain


Current Visit: Yes   Status: Acute   


Plan: 


Patient is 65 years of age multiple medical problems admitted with some 

shortness of breath chest discomfort intermittent chest pain woke up last night 

again with chest pain patient has hypokalemia renal failure patient is mildly 

anemic blood pressure elevated patient has cardiomegaly mild bilateral 

interstitial change BNP elevated may have an element of volume overload sick 

cardiac evaluation done many years ago by Dr. Welch most likely she has 

underlying congestive heart failure continue with Lasix echocardiogram or 

cardiology consult pend


Qualifiers: 


   Ischemic chest pain type: unspecified angina pectoris type

## 2021-01-25 LAB
BUN BLD-MCNC: 20 MG/DL (ref 7–18)
GLUCOSE SERPLBLD-MCNC: 88 MG/DL (ref 74–106)
NT-PROBNP SERPL-MCNC: 1867 PG/ML (ref ?–125)
POTASSIUM SERPL-SCNC: 3.5 MMOL/L (ref 3.5–5.1)

## 2021-01-25 RX ADMIN — HYDROCODONE BITARTRATE AND ACETAMINOPHEN PRN TAB: 5; 325 TABLET ORAL at 20:04

## 2021-01-25 RX ADMIN — AMLODIPINE BESYLATE SCH MG: 5 TABLET ORAL at 09:00

## 2021-01-25 RX ADMIN — HUMAN INSULIN SCH: 100 INJECTION, SOLUTION SUBCUTANEOUS at 16:08

## 2021-01-25 RX ADMIN — Medication SCH ML: at 09:30

## 2021-01-25 RX ADMIN — HEPARIN SODIUM SCH UNIT: 5000 INJECTION, SOLUTION INTRAVENOUS; SUBCUTANEOUS at 09:29

## 2021-01-25 RX ADMIN — HUMAN INSULIN SCH UNIT: 100 INJECTION, SOLUTION SUBCUTANEOUS at 12:49

## 2021-01-25 RX ADMIN — FUROSEMIDE SCH MG: 40 TABLET ORAL at 09:00

## 2021-01-25 RX ADMIN — GABAPENTIN SCH MG: 300 CAPSULE ORAL at 09:30

## 2021-01-25 RX ADMIN — Medication SCH ML: at 20:04

## 2021-01-25 RX ADMIN — HUMAN INSULIN SCH: 100 INJECTION, SOLUTION SUBCUTANEOUS at 20:07

## 2021-01-25 RX ADMIN — HUMAN INSULIN SCH: 100 INJECTION, SOLUTION SUBCUTANEOUS at 07:30

## 2021-01-25 RX ADMIN — Medication SCH: at 09:00

## 2021-01-25 RX ADMIN — HYDROCODONE BITARTRATE AND ACETAMINOPHEN PRN TAB: 5; 325 TABLET ORAL at 14:11

## 2021-01-25 RX ADMIN — HEPARIN SODIUM SCH UNIT: 5000 INJECTION, SOLUTION INTRAVENOUS; SUBCUTANEOUS at 20:03

## 2021-01-25 RX ADMIN — FUROSEMIDE SCH MG: 40 TABLET ORAL at 16:53

## 2021-01-25 NOTE — P.PN
Subjective


Date of Service: 01/25/21


Primary Care Provider: Dr. Honeycutt


Chief Complaint: Dyspnea, chest pain





patient seen/examined. she is feeling much better. breathing better. edema 

improved.





vs reviewed. sbp 140-170


lungs few basal crackles. improved air entry compared to yesterday


cvs regular


abd soft


ext edema improved





labs reviewed





cr 1.99. potassium 3.5





a/p:


ckd/? kristal/poorly controlled dm/needs to improve compliance with diet and meds/bp

improved overall but still on higher side.


increase coreg to 6.25mg twice daily


continue iv diuresis today


increase spirnolactone to 50mg





will need close nephrology and primary care s/p discharge. patient counseled 

about importance of home bp monitoring and better diet and diabetes control.





Physical Examination





- Vital Signs


Temperature: 97.4 F


Blood Pressure: 178/84


Pulse: 82


Respirations: 18


Pulse Ox (%): 93

## 2021-01-26 LAB
BUN BLD-MCNC: 19 MG/DL (ref 7–18)
CREAT UR-SCNC: 102 MG/DL (ref 20–320)
GLUCOSE SERPLBLD-MCNC: 139 MG/DL (ref 74–106)
POTASSIUM SERPL-SCNC: 3.9 MMOL/L (ref 3.5–5.1)
PROT UR-MCNC: 206 MG/DL (ref ?–11.9)

## 2021-01-26 RX ADMIN — FUROSEMIDE SCH MG: 40 TABLET ORAL at 08:20

## 2021-01-26 RX ADMIN — GABAPENTIN SCH MG: 300 CAPSULE ORAL at 08:17

## 2021-01-26 RX ADMIN — Medication SCH ML: at 21:36

## 2021-01-26 RX ADMIN — HUMAN INSULIN SCH: 100 INJECTION, SOLUTION SUBCUTANEOUS at 07:30

## 2021-01-26 RX ADMIN — Medication SCH ML: at 08:21

## 2021-01-26 RX ADMIN — HYDROCODONE BITARTRATE AND ACETAMINOPHEN PRN TAB: 5; 325 TABLET ORAL at 21:35

## 2021-01-26 RX ADMIN — HEPARIN SODIUM SCH UNIT: 5000 INJECTION, SOLUTION INTRAVENOUS; SUBCUTANEOUS at 08:17

## 2021-01-26 RX ADMIN — Medication SCH: at 08:17

## 2021-01-26 RX ADMIN — SPIRONOLACTONE SCH MG: 25 TABLET, FILM COATED ORAL at 08:20

## 2021-01-26 RX ADMIN — HUMAN INSULIN SCH: 100 INJECTION, SOLUTION SUBCUTANEOUS at 16:30

## 2021-01-26 RX ADMIN — HUMAN INSULIN SCH: 100 INJECTION, SOLUTION SUBCUTANEOUS at 11:30

## 2021-01-26 RX ADMIN — HEPARIN SODIUM SCH UNIT: 5000 INJECTION, SOLUTION INTRAVENOUS; SUBCUTANEOUS at 21:35

## 2021-01-26 RX ADMIN — HUMAN INSULIN SCH UNIT: 100 INJECTION, SOLUTION SUBCUTANEOUS at 21:00

## 2021-01-26 RX ADMIN — AMLODIPINE BESYLATE SCH MG: 5 TABLET ORAL at 08:18

## 2021-01-26 NOTE — P.PN
Subjective


Date of Service: 01/26/21





Patient is still short of breath today.  Patient's creatinine actually increased

from 1.9-2.49.  Spoke to Nephrology and since patient is on diuretics for her 

congestive heart failure in this is need medication for her they do not want her

to be discharged at this time.  They want to monitor her renal function.  

Patient's initial blood pressure was 205/114.  Over the course of getting this 

controlled patient's blood pressure has gradually come down.  There is concern 

that patient may have some acute tubular necrosis.  This may be causing the 

elevation in creatinine.  Patient's echocardiogram revealed diastolic heart 

failure.  Patient's long-term poor blood pressure control has resulted and her 

diastolic heart failure.  She also was hypoxic with oxygen saturations 82% on 

room air when she had it off last night.  At this time, plan is to monitor her 

renal function closely.  Will check a UA with microscopy as she also has 

complaints of her urine is very foamy which could indicate proteinuria.  If it 

is elevated then we will check for nephrotic range proteinuria.





Review of Systems


10-point ROS is otherwise unremarkable





Physical Examination





- Vital Signs


Temperature: 97.8 F


Blood Pressure: 138/87


Pulse: 85


Respirations: 19


Pulse Ox (%): 92





- Physical Exam


General: Alert, In no apparent distress, Oriented x3


HEENT: Atraumatic, PERRLA, EOMI


Neck: Supple, JVD not distended


Respiratory: Diminished, Crackles/rales


Cardiovascular: Regular rate/rhythm, Normal S1 S2, No murmurs


Gastrointestinal: Normal bowel sounds, Soft and benign, Non-distended, No 

tenderness


Musculoskeletal: No clubbing, No tenderness, Swelling


Neurological: Sensation intact, Cranial nerves 3-12 intact


Lymphatics: No axilla or inguinal lymphadenopathy





- Studies


Medications List Reviewed: Yes





Assessment & Plan





- Problems (Diagnosis)


(1) Acute CHF


Current Visit: Yes   Status: Acute   





(2) Diastolic dysfunction


Current Visit: Yes   Status: Acute   





(3) Acute kidney injury superimposed on chronic kidney disease


Current Visit: Yes   Status: Acute   





(4) Hypertensive emergency


Current Visit: Yes   Status: Acute   





(5) Acute tubular necrosis


Current Visit: Yes   Status: Acute   





- Plan





1. Echocardiogram  revealed diastolic heart failure.  Continue with diuresing 

and strict blood pressure control so hopefully will be able truck get this 

improved over the next 6-12 months


2. Continue with cardiac meds; monitor diuresis closely as patient's creatinine 

has increased substantially.


3. Appreciate Cardiology and Nephrology consultation


4. Continue Lasix and Aldactone


5. Strict I's and O's


6. UA with microscopy as well as a renal ultrasound; if significant proteinuria 

the we may need to do a 24 hr urine for nephrotic range proteinuria 


7. Room air oxygen assessment as oxygen saturations were 82% on room air.


8. Daily weights


9. Education regarding diet and treatment of congestive heart failure


Discharge Plan: Home


Plan to discharge in: Greater than 2 days





- Advance Directives


Does patient have a Living Will: No


Does patient have a Durable POA for Healthcare: No





- Code Status/Comfort Care


Code Status: Full Code


Critical Care: No


Time Spent Managing PTS Care (In Minutes): 35

## 2021-01-26 NOTE — WHHP
Date of Admission:  01/23/2021



Subjective:  The patient is seen at the bedside.  No events reported.  The patient feels well.  She d
enies any fevers, chills, chest pain, shortness of breath, nausea, vomiting, or diarrhea.



Objective:  Vital Signs:  Blood pressure 143/75, pulse 86, temperature 98.2.  Input and output 1520 a
nd 1300 out.  Other days input and output not measured, but the patient has had a weight loss of her 
records. 

General:  Obese, no acute distress. 

Heart:  Regular rate and rhythm.  No murmurs, rubs, gallops. 

Lungs:  Grossly clear. 

Abdomen:  Soft. 

Extremities:  Without any significant edema.



Laboratory Data:  BUN and creatinine are 19 and 2.49.  Hemoglobin A1c 10.8.  BNP 1867 on the 25th.  H
ematology data are showing hemoglobin 10.7, hematocrit 32.1.



Current Medications:  Reviewed.  The patient is on Lasix 40 mg p.o. b.i.d. as well as amlodipine, car
vedilol, and spironolactone was also added.



Impression:  

1.Acute kidney injury on underlying chronic kidney disease, combination of likely diabetic nephropat
hy as well as cardiorenal syndrome.

2.Electrolyte imbalance.

3.Chronic anemia.

4.History of hypertension.

5.History of diabetes.



Plan:  The patient's renal function has shown worsening and this likely in the setting of volume depl
etion as well as renal hypoperfusion.  The patient did have very high blood pressures on admission in
 the 180s range and has been trending downward daily.  This is likely combination of diuresis and add
ition of antihypertensives.  We will continue current dose of antihypertensives as well as Aldactone.
  However, I will decrease patient's Lasix daily.  The patient will need to stay here in the hospital
 for acute renal failure and to ensure that electrolyte balance is stable as well as renal function s
hows same type of stability.  Please monitor strict input and output.  Continue renal diet and we bebo
l continue to follow.





/SHAMA

DD:  01/26/2021 11:33:52Voice ID:  [id number]

## 2021-01-26 NOTE — ECHO
HEIGHT: 5 ft 4 in   WEIGHT: 240 lb 4.8 oz   DATE OF STUDY: 01/25/2021   REFER DR: 
Holland Yan MD

2-DIMENSIONAL: YES

     M.MODE: YES

 DOPPLER: YES

COLOR FLOW: YES



                    TDS:  

PORTABLE: 

 DEFINITY:  

BUBBLE STUDY: 





DIAGNOSIS:  CONGESTIVE HEART FAILURE



CARDIAC HISTORY:  

CATHERIZATION: 

SURGERY: 

PROSTHETIC VALVE: 

PACEMAKER: 





MEASUREMENTS (cm)

    DIASTOLIC (NORMALS)             SYSTOLIC (NORMALS)

IVSd                 1.2 (0.6-1.2)                    LA Diam 3.8 (1.9-4.0)     LVEF       
  50%  

LVIDd               4.4 (3.5-5.7)                        LVIDs      3.3 (2.0-3.5)     %FS  
        25%

LVPWd             1.1 (0.6-1.2)

Ao Diam           2.8 (2.0-3.7)



2 DIMENSIONAL ASSESSMENT:

RIGHT ATRIUM:                   NORMAL

LEFT ATRIUM:       NORMAL



RIGHT VENTRICLE:            NORMAL

LEFT VENTRICLE: NORMAL



TRICUSPID VALVE:             NORMAL

MITRAL VALVE:     NORMAL



PULMONIC VALVE:             NORMAL

AORTIC VALVE:     NORMAL



PERICARDIAL EFFUSION: NONE

AORTIC ROOT:      NORMAL





LEFT VENTRICULAR WALL MOTION:     NORMAL



DOPPLER/COLOR FLOW:     SEE BELOW



COMMENTS:      NORMAL LEFT VENTRICULAR EJECTION FRACTION 55-60%.  DIASTOLIC DYSFUNCTION.  

                            POOR WINDOWS.



TECHNOLOGIST:   DOUG MORENO

## 2021-01-26 NOTE — P.PN
Subjective


Date of Service: 01/25/21





Echocardiogram pending.  Patient respiratory status is still very poor.  She had

still gets very short of breath.  Spoke with Nephrology and they wanted to 

continue with diuresing.  Patient blood pressure is better controlled today as 

well.  Hopefully her renal function remains stable and echocardiogram did not 

reveal any abnormality and we can discharge home shortly.





Review of Systems


10-point ROS is otherwise unremarkable





Physical Examination





- Vital Signs


Temperature: 98.2 F


Blood Pressure: 143/75


Pulse: 86


Respirations: 18


Pulse Ox (%): 95





- Physical Exam


General: Alert, In no apparent distress, Oriented x3


HEENT: Atraumatic, PERRLA, EOMI


Neck: Supple, JVD not distended


Respiratory: Crackles/rales


Cardiovascular: Regular rate/rhythm, Normal S1 S2, Systolic murmur


Gastrointestinal: Normal bowel sounds, Soft and benign, Non-distended, No 

tenderness


Musculoskeletal: No tenderness


Integumentary: No rashes


Neurological: Normal speech, Normal tone, Normal affect


Lymphatics: No axilla or inguinal lymphadenopathy





- Studies


Medications List Reviewed: Yes





Assessment & Plan





- Problems (Diagnosis)


(1) Acute CHF


Current Visit: Yes   Status: Acute   





(2) Diastolic dysfunction


Current Visit: Yes   Status: Acute   





(3) Acute kidney injury superimposed on chronic kidney disease


Current Visit: Yes   Status: Acute   





- Plan





1. Echocardiogram 


2. Continue with cardiac meds


3. Appreciate Cardiology and Nephrology consultation


4. Aggressive diuresis


5. Strict I's and O's


6. Repeat CXR


7. Daily weights


8. Education regarding diet and treatment of congestive heart failure


Discharge Plan: Home


Plan to discharge in: Greater than 2 days





- Advance Directives


Does patient have a Living Will: No


Does patient have a Durable POA for Healthcare: No





- Code Status/Comfort Care


Code Status Assessed: Yes


Code Status: Full Code


Critical Care: No


Time Spent Managing PTS Care (In Minutes): 35

## 2021-01-27 VITALS — DIASTOLIC BLOOD PRESSURE: 70 MMHG | SYSTOLIC BLOOD PRESSURE: 170 MMHG

## 2021-01-27 VITALS — TEMPERATURE: 98.8 F

## 2021-01-27 VITALS — OXYGEN SATURATION: 99 %

## 2021-01-27 LAB
BUN BLD-MCNC: 22 MG/DL (ref 7–18)
GLUCOSE SERPLBLD-MCNC: 112 MG/DL (ref 74–106)
MAGNESIUM SERPL-MCNC: 2.1 MG/DL (ref 1.8–2.4)
POTASSIUM SERPL-SCNC: 3.4 MMOL/L (ref 3.5–5.1)

## 2021-01-27 RX ADMIN — SPIRONOLACTONE SCH MG: 25 TABLET, FILM COATED ORAL at 10:28

## 2021-01-27 RX ADMIN — Medication SCH ML: at 10:28

## 2021-01-27 RX ADMIN — HEPARIN SODIUM SCH UNIT: 5000 INJECTION, SOLUTION INTRAVENOUS; SUBCUTANEOUS at 10:29

## 2021-01-27 RX ADMIN — HUMAN INSULIN SCH: 100 INJECTION, SOLUTION SUBCUTANEOUS at 07:30

## 2021-01-27 RX ADMIN — AMLODIPINE BESYLATE SCH MG: 5 TABLET ORAL at 10:28

## 2021-01-27 RX ADMIN — HUMAN INSULIN SCH: 100 INJECTION, SOLUTION SUBCUTANEOUS at 16:30

## 2021-01-27 RX ADMIN — GABAPENTIN SCH MG: 300 CAPSULE ORAL at 10:28

## 2021-01-27 RX ADMIN — Medication SCH: at 09:00

## 2021-01-27 RX ADMIN — HUMAN INSULIN SCH: 100 INJECTION, SOLUTION SUBCUTANEOUS at 11:30

## 2021-01-27 NOTE — PN
Date of Progress Note:  01/27/2021



Subjective:  The patient was seen and examined at bedside.  She is doing well.  Her shortness of flora
th is improving.  She just having peripheral neuropathy, but otherwise doing okay.



Objective:  Vital Signs:  Have been reviewed and are stable. 

General:  She appears in no acute distress. 

HEENT:  Atraumatic head. 

Lungs:  Clear to auscultation. 

Abdomen:  Soft and nontender. 

Extremities:  Showed no evidence of edema.



Laboratory Data:  Showing sodium of 140, potassium of 3.4, chloride of 104, BUN of 22, and creatinine
 of 2.26.  BMP showing stable hemoglobin, hematocrit, and platelet count.



Medications:  Current medications include amlodipine, atorvastatin, acetaminophen, carvedilol, Lasix 
40 mg daily, gabapentin 300 mg daily, potassium supplementation 1 time dose was given and spironolact
one 50 mg a day was added.



Impression:  

1.Acute on chronic renal insufficiency with underlying diabetic nephropathy.  The patient's renal fu
nction is overall stable.  The patient does have significant proteinuria as well.

2.Shortness of breath secondary to possibly from diastolic heart failure.  The patient has been diur
esed well and her volume status is improving.

3.Underlying type 2 diabetes with diabetic neuropathy.  The patient has been started on gabapentin w
ith improvement.

4.Hypertension.

5.Hypokalemia.



Plan:  The patient is overall doing much better at this time.  She is okay to be discharged from Neph
rology standpoint with the current dose of medications.  She is okay to be discharged on spironolacto
ne 50 mg a day and Lasix 40 mg b.i.d.  She was advised to follow up with Nephrology as outpatient and
 will also discuss with Dr. Peterson regarding 

her discharge medications.  She is okay to resume her gabapentin upon discharge.





VV/MODL

DD:  01/27/2021 13:16:45Voice ID:  [id number]

DT:  01/27/2021 13:53:44Report ID:  [id number]

## 2021-01-27 NOTE — P.DS
Discharge Date: 01/27/21


Primary Care Provider: Dr. Honeycutt


Disposition: ROUTINE DISCHARGE


Discharge Condition: GOOD


Reason for Admission: Dyspnea, chest pain


Consultations: 





Cardiologist 





- Problems


(1) Acute CHF


Status: Acute   





(2) Diastolic dysfunction


Status: Acute   





(3) Acute kidney injury superimposed on chronic kidney disease


Status: Acute   





(4) Hypertensive emergency


Status: Acute   





(5) Acute tubular necrosis


Status: Acute   


Brief History of Present Illness: 





Patient is a 65-year-old  female with history of diabetes mellitus type

2, hypertension, CKD 4 presents to the emergency department for shortness of 

breath, chest pain.  Patient reports that for the last 24 hr she has been 

significantly short of breath, having some chest discomfort.  Patient reports 

that she does chronically have some level of shortness of breath that she 

attributes to being obese.  Patient reports that yesterday when she was sleeping

she was woken from her sleep with significant shortness of breath and had to 

walk to the living room to sit in a chair to recover, does describe some 

orthopnea and dyspnea on exertion.  Patient also noted to have 1+ pitting edema 

bilateral lower extremities, patient reports that she takes for Lasix once 

daily, unsure of the dose, reports that she was told to take it because she has 

extra fluid on her legs but not why.  Patient also noted to have a GFR of 28, 

similar to last year but patient does not admit to any renal failure.  Patient 

does take losartan, Lasix, tresiba at home.  Patient was evaluated in the 

emergency department, labs remarkable for a white blood cell count 8.9 

hemoglobin 10.9 hematocrit 32.7 potassium 3.3 creatinine 1.8 to GFR 28 BNP 1767 

chest x-ray with questionable pneumonia versus pulmonary edema.  Patient reports

not taking her Lasix today as she was getting short of breath when she is 

walking.  ED provider wishes to admit patient under observation for further 

evaluation and management. 


Hospital Course: 





Patient's workup revealed diastolic dysfunction. She was given diuretics. 

Continue with cardiac meds at this time. Patient is clinically doing well. At 

this time, patient is stable for discharge home. 


Vital Signs/Physical Exam: 














Temp Pulse Resp BP Pulse Ox


 


 98.3 F   79   20   177/77 H  93 


 


 01/27/21 12:00  01/27/21 12:00  01/27/21 12:00  01/27/21 12:00  01/27/21 12:00








General: Alert, In no apparent distress, Oriented x3


Laboratory Data at Discharge: 














WBC  10.7 K/uL (4.3-10.9)  D 01/24/21  05:18    


 


Hgb  10.7 g/dL (12.0-15.0)  L  01/24/21  05:18    


 


Hct  32.1 % (36.0-45.0)  L  01/24/21  05:18    


 


Plt Count  330 K/uL (152-406)   01/24/21  05:18    


 


PT  11.2 SECONDS (9.5-12.5)   01/23/21  20:15    


 


INR  0.95   01/23/21  20:15    


 


Sodium  140 mmol/L (136-145)   01/27/21  04:08    


 


Potassium  3.4 mmol/L (3.5-5.1)  L  01/27/21  04:08    


 


BUN  22 mg/dL (7-18)  H  01/27/21  04:08    


 


Creatinine  2.46 mg/dL (0.55-1.3)  H  01/27/21  04:08    


 


Glucose  112 mg/dL ()  H  01/27/21  04:08    


 


Magnesium  2.1 mg/dL (1.8-2.4)   01/27/21  04:08    


 


Total Bilirubin  0.5 mg/dL (0.2-1.0)   01/23/21  20:15    


 


AST  15 U/L (15-37)   01/23/21  20:15    


 


ALT  13 U/L (12-78)   01/23/21  20:15    


 


Alkaline Phosphatase  78 U/L ()   01/23/21  20:15    


 


Troponin I  < 0.02 ng/mL (0.0-0.045)   01/24/21  12:46    


 


Triglycerides  210 mg/dL (<150)  H  01/24/21  05:18    


 


Cholesterol  184 mg/dL (<200)   01/24/21  05:18    


 


HDL Cholesterol  40 mg/dL (40-60)   01/24/21  05:18    


 


Cholesterol/HDL Ratio  4.60   01/24/21  05:18    








Home Medications: 








RX: Amlodipine Besylate 1 tab PO DAILY 03/20/15 


RX: Gabapentin 1 cap PO DAILY 03/20/15 


RX: Metformin HCl 2 tab PO BID 03/20/15 


RX: Tramadol HCl 1 tab PO DAILY PRN 03/20/15 


Magnesium [Magnesium Gluconate] 500 mg PO TID #30 tablet 03/23/15 


RX: Insulin Detemir [Levemir*] 35 unit SQ DAILY #30 ml 03/23/15 


Amlodipine [Norvasc] 5 mg PO DAILY #30 tab 01/27/21 


Furosemide [Lasix] 40 mg PO DAILY #30 tab 01/27/21 


RX: Hydrocodone 5/APAP 325 [Norco 5/325*] 1 tab PO Q6H PRN #30 tab 01/27/21 


RX: Spironolactone 50 mg PO DAILY #30 tablet 01/27/21 


carvediloL [Coreg] 6.25 mg PO BID #60 tab 01/27/21 





New Medications: 


carvediloL [Coreg] 6.25 mg PO BID #60 tab


Furosemide [Lasix] 40 mg PO DAILY #30 tab


RX: Hydrocodone 5/APAP 325 [Norco 5/325*] 1 tab PO Q6H PRN #30 tab


 PRN Reason: Pain Scale 5-7 (Moderate)


Amlodipine [Norvasc] 5 mg PO DAILY #30 tab


RX: Spironolactone 50 mg PO DAILY #30 tablet


Patient Discharge Instructions: OK TO DC IV AND DC HOME.  FOLLOW-UP WITH PRIMARY

 CARE PROVIDER IN 1-2 WEEKS.  FOLLOW-UP WITH CARDIOLOGY IN 1-2 WEEKS.  FOLLOW-UP

 WITH Nephrology IN 1-2 WEEKS.  RETURN TO THE ER IF symptoms worsen.  CALL or 

TEXT DR. HUYNH [phone] IF ANY QUESTIONS REGARDING HOSPITAL STAY.  PLEASE 

CALL THE FLOOR -[phone] IF ANY MEDICATION OR NURSING QUESTIONS.  Outpatient 

labs in 1-2 weeks to monitor renal function


Diet: Renal (Heart healthy diet)


Activity: Fall precautions


Followup: 


Manjeet Ferrer DO [ACTIVE - CAN ADMIT] - 


Aamir Oneil MD [ACTIVE - CAN ADMIT] - 


Luis F Honeycutt MD [Primary Care Provider] - 


Time spent managing pt's care (in minutes): 35

## 2021-12-31 ENCOUNTER — HOSPITAL ENCOUNTER (INPATIENT)
Dept: HOSPITAL 97 - ER | Age: 66
LOS: 7 days | Discharge: LEFT BEFORE DISCHARGE | DRG: 177 | End: 2022-01-07
Admitting: HOSPITALIST
Payer: COMMERCIAL

## 2021-12-31 VITALS — BODY MASS INDEX: 36.5 KG/M2

## 2021-12-31 DIAGNOSIS — J12.82: ICD-10-CM

## 2021-12-31 DIAGNOSIS — Z79.01: ICD-10-CM

## 2021-12-31 DIAGNOSIS — Z79.4: ICD-10-CM

## 2021-12-31 DIAGNOSIS — Z90.49: ICD-10-CM

## 2021-12-31 DIAGNOSIS — E11.649: ICD-10-CM

## 2021-12-31 DIAGNOSIS — E11.65: ICD-10-CM

## 2021-12-31 DIAGNOSIS — N18.4: ICD-10-CM

## 2021-12-31 DIAGNOSIS — J96.01: ICD-10-CM

## 2021-12-31 DIAGNOSIS — R42: ICD-10-CM

## 2021-12-31 DIAGNOSIS — Z79.52: ICD-10-CM

## 2021-12-31 DIAGNOSIS — I13.0: ICD-10-CM

## 2021-12-31 DIAGNOSIS — E87.1: ICD-10-CM

## 2021-12-31 DIAGNOSIS — T39.315A: ICD-10-CM

## 2021-12-31 DIAGNOSIS — G23.8: ICD-10-CM

## 2021-12-31 DIAGNOSIS — Z98.84: ICD-10-CM

## 2021-12-31 DIAGNOSIS — M89.9: ICD-10-CM

## 2021-12-31 DIAGNOSIS — E86.0: ICD-10-CM

## 2021-12-31 DIAGNOSIS — U07.1: Primary | ICD-10-CM

## 2021-12-31 DIAGNOSIS — E87.2: ICD-10-CM

## 2021-12-31 DIAGNOSIS — G91.1: ICD-10-CM

## 2021-12-31 DIAGNOSIS — Z79.899: ICD-10-CM

## 2021-12-31 DIAGNOSIS — D63.8: ICD-10-CM

## 2021-12-31 DIAGNOSIS — G93.41: ICD-10-CM

## 2021-12-31 DIAGNOSIS — I21.4: ICD-10-CM

## 2021-12-31 DIAGNOSIS — E11.22: ICD-10-CM

## 2021-12-31 DIAGNOSIS — I40.0: ICD-10-CM

## 2021-12-31 DIAGNOSIS — Z79.84: ICD-10-CM

## 2021-12-31 DIAGNOSIS — E44.0: ICD-10-CM

## 2021-12-31 DIAGNOSIS — N17.9: ICD-10-CM

## 2021-12-31 DIAGNOSIS — I50.32: ICD-10-CM

## 2021-12-31 LAB
ALBUMIN SERPL BCP-MCNC: 3 G/DL (ref 3.4–5)
ALP SERPL-CCNC: 72 U/L (ref 45–117)
ALT SERPL W P-5'-P-CCNC: 16 U/L (ref 12–78)
AST SERPL W P-5'-P-CCNC: 15 U/L (ref 15–37)
BUN BLD-MCNC: 40 MG/DL (ref 7–18)
BUN BLD-MCNC: 40 MG/DL (ref 7–18)
COHGB MFR BLDA: 1.1 % (ref 0–1.5)
GLUCOSE SERPLBLD-MCNC: 358 MG/DL (ref 74–106)
GLUCOSE SERPLBLD-MCNC: 388 MG/DL (ref 74–106)
HCT VFR BLD CALC: 40.8 % (ref 36–45)
INR BLD: 1
LIPASE SERPL-CCNC: 141 U/L (ref 73–393)
LYMPHOCYTES # SPEC AUTO: 1.1 K/UL (ref 0.7–4.9)
OXYHGB MFR BLDA: 63.5 % (ref 94–97)
PMV BLD: 9.7 FL (ref 7.6–11.3)
POTASSIUM SERPL-SCNC: 3.8 MMOL/L (ref 3.5–5.1)
POTASSIUM SERPL-SCNC: 4 MMOL/L (ref 3.5–5.1)
RBC # BLD: 4.74 M/UL (ref 3.86–4.86)
SAO2 % BLDA: 64.8 % (ref 92–98.5)
SARS-COV-2 RNA RESP QL NAA+PROBE: POSITIVE
TROPONIN (EMERG DEPT USE ONLY): 0.07 NG/ML (ref 0–0.04)

## 2021-12-31 PROCEDURE — 36415 COLL VENOUS BLD VENIPUNCTURE: CPT

## 2021-12-31 PROCEDURE — 83036 HEMOGLOBIN GLYCOSYLATED A1C: CPT

## 2021-12-31 PROCEDURE — 85025 COMPLETE CBC W/AUTO DIFF WBC: CPT

## 2021-12-31 PROCEDURE — 97110 THERAPEUTIC EXERCISES: CPT

## 2021-12-31 PROCEDURE — 71045 X-RAY EXAM CHEST 1 VIEW: CPT

## 2021-12-31 PROCEDURE — 87040 BLOOD CULTURE FOR BACTERIA: CPT

## 2021-12-31 PROCEDURE — 83735 ASSAY OF MAGNESIUM: CPT

## 2021-12-31 PROCEDURE — 84443 ASSAY THYROID STIM HORMONE: CPT

## 2021-12-31 PROCEDURE — 97161 PT EVAL LOW COMPLEX 20 MIN: CPT

## 2021-12-31 PROCEDURE — 83690 ASSAY OF LIPASE: CPT

## 2021-12-31 PROCEDURE — 82010 KETONE BODYS QUAN: CPT

## 2021-12-31 PROCEDURE — 96375 TX/PRO/DX INJ NEW DRUG ADDON: CPT

## 2021-12-31 PROCEDURE — 82550 ASSAY OF CK (CPK): CPT

## 2021-12-31 PROCEDURE — 84145 PROCALCITONIN (PCT): CPT

## 2021-12-31 PROCEDURE — 82728 ASSAY OF FERRITIN: CPT

## 2021-12-31 PROCEDURE — 85730 THROMBOPLASTIN TIME PARTIAL: CPT

## 2021-12-31 PROCEDURE — 70450 CT HEAD/BRAIN W/O DYE: CPT

## 2021-12-31 PROCEDURE — 85610 PROTHROMBIN TIME: CPT

## 2021-12-31 PROCEDURE — 84100 ASSAY OF PHOSPHORUS: CPT

## 2021-12-31 PROCEDURE — 83880 ASSAY OF NATRIURETIC PEPTIDE: CPT

## 2021-12-31 PROCEDURE — 82947 ASSAY GLUCOSE BLOOD QUANT: CPT

## 2021-12-31 PROCEDURE — 93306 TTE W/DOPPLER COMPLETE: CPT

## 2021-12-31 PROCEDURE — 97112 NEUROMUSCULAR REEDUCATION: CPT

## 2021-12-31 PROCEDURE — 82805 BLOOD GASES W/O2 SATURATION: CPT

## 2021-12-31 PROCEDURE — 80061 LIPID PANEL: CPT

## 2021-12-31 PROCEDURE — 82140 ASSAY OF AMMONIA: CPT

## 2021-12-31 PROCEDURE — 96372 THER/PROPH/DIAG INJ SC/IM: CPT

## 2021-12-31 PROCEDURE — 70553 MRI BRAIN STEM W/O & W/DYE: CPT

## 2021-12-31 PROCEDURE — 84439 ASSAY OF FREE THYROXINE: CPT

## 2021-12-31 PROCEDURE — 96374 THER/PROPH/DIAG INJ IV PUSH: CPT

## 2021-12-31 PROCEDURE — 96361 HYDRATE IV INFUSION ADD-ON: CPT

## 2021-12-31 PROCEDURE — 93005 ELECTROCARDIOGRAM TRACING: CPT

## 2021-12-31 PROCEDURE — 80076 HEPATIC FUNCTION PANEL: CPT

## 2021-12-31 PROCEDURE — 99285 EMERGENCY DEPT VISIT HI MDM: CPT

## 2021-12-31 PROCEDURE — 86140 C-REACTIVE PROTEIN: CPT

## 2021-12-31 PROCEDURE — 80053 COMPREHEN METABOLIC PANEL: CPT

## 2021-12-31 PROCEDURE — 83605 ASSAY OF LACTIC ACID: CPT

## 2021-12-31 PROCEDURE — 97530 THERAPEUTIC ACTIVITIES: CPT

## 2021-12-31 PROCEDURE — 0240U: CPT

## 2021-12-31 PROCEDURE — 80048 BASIC METABOLIC PNL TOTAL CA: CPT

## 2021-12-31 PROCEDURE — 94760 N-INVAS EAR/PLS OXIMETRY 1: CPT

## 2021-12-31 PROCEDURE — 84484 ASSAY OF TROPONIN QUANT: CPT

## 2021-12-31 NOTE — ER
Nurse's Notes                                                                                     

 [facility]                                                                 

Name: Josette Faye                                                                                 

Age: 66 yrs                                                                                       

Sex: Female                                                                                       

: 1955                                                                                   

MRN: [id number]                                                                                   

Arrival Date: 2021                                                                          

Time: 10:52                                                                                       

Account#: [id number]                                                                            

Bed 25                                                                                            

Private MD:                                                                                       

Diagnosis: Pneumonia due to SARS-associated coronavirus;Dehydration;Hyperglycemia,                

  unspecified;Altered mental status, unspecified                                                  

                                                                                                  

Presentation:                                                                                     

                                                                                             

11:06 Chief complaint: EMS states: AMS AND FEVER AT HOME. Coronavirus screen: At this time,   bp  

      the client does not indicate any symptoms associated with coronavirus-19. Ebola Screen:     

      No symptoms or risks identified at this time.                                               

11:06 Acuity: AMPARO 2                                                                           bp  

11:06 Method Of Arrival: EMS: [facility]                                                    bp  

20:31 Risk Assessment: Do you want to hurt yourself or someone else?.                         miguel angel  

20:32 Initial Sepsis Screen: Does the patient meet any 2 criteria?.                           miguel angel  

                                                                                                  

Triage Assessment:                                                                                

11:30 General: Appears distressed, uncomfortable, obese, unkempt, Behavior is fussy,          bp  

      uncooperative. Pain: Unable to use pain scale. REFUSES TO ANSWER. EENT: No signs and/or     

      symptoms were reported regarding the EENT system. Neuro: Level of Consciousness is          

      lethargic, Oriented to REFUSING TO ANSWER. Cardiovascular: Rhythm is sinus tachycardia.     

      Respiratory: No deficits noted. GI: No signs and/or symptoms were reported involving        

      the gastrointestinal system. Abdomen is obese. : BRIEF IN PLACE. Derm: No deficits        

      noted. Musculoskeletal: No deficits noted.                                                  

                                                                                                  

Historical:                                                                                       

- Allergies:                                                                                      

11:30 No Known Drug Allergies;                                                                bp  

- Home Meds:                                                                                      

11:30 amlodipine oral [Active]; Spironolactone Oral [Active]; gabapentin oral [Active];       bp  

      insulin regular human injection [Active]; lisinopril Oral [Active]; Metformin Oral          

      [Active]; Tramadol Oral [Active]; carvedilol oral [Active]; Furosemide Oral [Active];       

- PMHx:                                                                                           

11:30 Diabetes - IDDM; Hypertension;                                                          bp  

                                                                                                  

- Immunization history:: Adult Immunizations unknown.                                             

- Social history:: Smoking status: Patient denies any tobacco usage or history of.                

- Family history:: not pertinent.                                                                 

- Hospitalizations: : No recent hospitalization is reported.                                      

                                                                                                  

                                                                                                  

Screenin:00 Abuse screen: Denies threats or abuse. Denies injuries from another. Nutritional        bp  

      screening: No deficits noted. Tuberculosis screening: No symptoms or risk factors           

      identified. Fall Risk None identified.                                                      

                                                                                                  

Assessment:                                                                                       

11:30 General: SEE TRIAGE NOTE.                                                               bp  

13:30 Reassessment: No changes from previously documented assessment. Patient and/or family   bp  

      updated on plan of care and expected duration. Pain level reassessed. PT UNCOOPERATIVE      

      WITH STAFF ATTEMPTING TO CLEAN STOOL AND REPLACE DIAPER.                                    

15:19 Reassessment: No changes from previously documented assessment. Patient and/or family   bp  

      updated on plan of care and expected duration. Pain level reassessed. PT CONTINUES TO       

      BE UNCOOPERATIVE, REQUIRING REDIRECTION FOR FURTHER HEALTH CARE ACTIVITIES.                 

20:27 General: The pt was received and remains AAOx2, possibly, 3. She is combative and the   miguel angel  

      family was asked to stay with the pt to assist. The pt's linen was changed, as she has      

      a hx of incontinence at home, as well. Per the family's report, she wears a "diaper" at     

      home and is incontinent. .                                                                  

                                                                                             

01:32 General: I recv'd the pt at 1900 and she remains confused and uncooperative. I asked    miguel angel  

      her family to remain at bedside, given her behavior. The pt's , Jasiel, agreed.         

      Both Jasiel and the pt's daughter, assisted me in changing the linen x3 incontinent            

      episodes, before a josue order was obtained from [facility] \T\ 0000. The josue was placed at       

      0030 and Jasiel assisted in doing so. The pt remained combative, but we managed to place       

      it using sterile technique. I have requested medication for the pt's anxiety and was        

      able to get assistance with the admission orders and MAR \T\ 0100. I will medicate the pt     

      when I am able to get a witness for the waste, as she was pulling on the josue and          

      needs labs drawn. Jasiel remains at bedside and the pt is "spot checked" for O2 Sats, as       

      she removes the monitor leads. .                                                            

02:53 General: The pt was medicated with .25 mg of Ativan and we reattached monitor, changed  miguel angel  

      linen, repositioned and sravani labs. She tolerated all well. Her  remains at           

      bedside. He is extremely helpful and supportive. .                                          

06:23 General: Pt's BS was 383. .                                                             miguel angel  

23:35 Reassessment: Patient is alert, oriented x 3, equal unlabored respirations, skin        kd3 

      warm/dry/pink. PT SEEN SLEEPING IN BED, NO SIGNS OF DISTRESS. VITALS STABLE.  AT     

      THE BEDSIDE.                                                                                

                                                                                             

03:04 Reassessment: No changes from previously documented assessment. pt resting in bed.      kd3 

                                                                                                  

Vital Signs:                                                                                      

                                                                                             

11:30  / 89; Pulse 107; Resp 17; Temp 98.5; Pulse Ox 97% ;                              bp  

14:47  / 93 LA Supine (auto/lg); Pulse 113 MON; Resp 18 S; Temp 98.2(A); Pulse Ox 96%   mb4 

      on R/A;                                                                                     

20:27  / 88; Pulse 110; Resp 20; Pulse Ox 97% on R/A;                                   miguel angel  

                                                                                             

01:38  / 75; Pulse 81; Resp 14; Pulse Ox 96% on R/A;                                    miguel angel  

02:53  / 98; Pulse 100; Resp 20; Pulse Ox 96% on R/A;                                   miguel angel  

04:30  / 87; Pulse 80; Resp 12; Pulse Ox 95% on R/A;                                    miguel angel  

06:22  / 92; Pulse 81; Resp 16; Pulse Ox 95% on R/A; Pain 0/10;                         miguel angel  

23:36  / 88; Pulse 91; Resp 18; Pulse Ox 97% on NC;                                     kd3 

                                                                                             

03:05  / 61; Pulse 75; Pulse Ox 91% on R/A;                                             kd3 

                                                                                                  

ED Course:                                                                                        

                                                                                             

10:52 Patient arrived in ED.                                                                  ds1 

10:52 Walt Cordero MD is Attending Physician.                                                rn  

11:02 Luis F Cartagena, RN is Primary Nurse.                                                    bp  

11:10 Initial lab(s) drawn, by me, sent to lab. First set of blood cultures drawn.            mb4 

11:13 Triage completed.                                                                       bp  

11:22 Patient has correct armband on for positive identification. Bed in low position. Side   mb4 

      rails up X2. Adult w/ patient. Cardiac monitor on. Pulse ox on. NIBP on.                    

11:35 CT Head Brain wo Cont In Process Unspecified.                                           EDMS

11:53 Chest Single View XRAY In Process Unspecified.                                          EDMS

12:07 EKG done, by ED staff, reviewed by Walt Cordero MD.                                      mb4 

12:35 Assisted with dressing. Cleaned of incontinence.                                        mb4 

15:12 initiated a transfer with Jean Douglas from the [facility] Transfer [place].         eb  

16:35 pt declined from [facility] due to being at capacity/ pt has been placed on their   eb  

      tracker board in case a covid bed opens.                                                    

17:05 initiated at transfer with Meera from the [facility].            eb  

17:18 connected Dr. Goldman the neurosurgeon on call for [facility] with Dr. Gil antony  

      for patient transfer consultation.                                                          

17:25 Rivka from the [facility] [place] called to decline the patient in     eb  

      transfer/ they are at capacity with medical covid beds.                                     

17:42 Robb Longoria is Hospitalizing Provider.                                               rn  

20:31 No provider procedures requiring assistance completed.                                  miguel angel  

                                                                                             

10:43 Missed attempt(s): 20 gauge in left upper arm. 22 gauge in left antecubital area.       5 

10:49 Missed attempt(s): 22 gauge in right forearm. Bleeding controlled, band aid applied,    mb4 

      catheter tip intact.                                                                        

10:49 Missed attempt(s): 22 gauge in right hand. Bleeding controlled, band aid applied,       mb4 

      catheter tip intact.                                                                        

                                                                                                  

Administered Medications:                                                                         

                                                                                             

11:20 Drug: NS 0.9% 500 ml Route: IV; Rate: bolus; Site: left forearm;                        bp  

17:55 Follow up: IV Status: Completed infusion; IV Intake: 500ml                              bp  

11:20 Drug: Zofran (Ondansetron) 4 mg Route: IVP; Site: left forearm;                         bp  

14:32 Follow up: Response: No adverse reaction; Nausea is decreased                           bp  

11:20 Drug: hydrALAZINE 5 mg Route: IVP; Site: left forearm;                                  bp  

14:33 Follow up: Response: No adverse reaction                                                bp  

13:45 Drug: Insulin Regular Human 10 units {Co-Signature: cho (Daria Tanner RN).} Route:  bp  

      Sub-Q; Site: left lower abdomen;                                                            

14:33 Follow up: Response: No adverse reaction                                                bp  

14:00 Drug: NS 0.9% 1000 ml Route: IV; Rate: 1000 ml; Site: left forearm;                     bp  

17:55 Follow up: IV Status: Completed infusion; IV Intake: 1000ml                             bp  

17:00 Drug: SOLU-Medrol (methylPrednisoLONE) 125 mg Route: IVP; Site: left forearm;           bp  

17:56 Follow up: Response: No adverse reaction                                                bp  

17:56 Drug: Ativan (LORazepam) 1 mg Route: IVP; Site: left forearm;                           bp  

                                                                                                  

                                                                                                  

Intake:                                                                                           

17:55 IV: 1000ml; Total: 1000ml.                                                              bp  

17:55 IV: 500ml; Total: 1500ml.                                                               bp  

                                                                                                  

Output:                                                                                           

                                                                                             

06:22 Urine: 400ml (Josue); Total: 400ml.                                                     miguel angel  

                                                                                                  

Outcome:                                                                                          

                                                                                             

16:16 ER care complete, transfer ordered by MD.                                               rn  

17:43 Decision to Hospitalize by Provider.                                                    rn  

20:31 Condition: unchanged                                                                    miguel angel  

20:31 Admitted to                                                                             miguel angel  

                                                                                             

06:31 Patient left the ED.                                                                    eb  

                                                                                                  

Signatures:                                                                                       

Dispatcher [employer]                           EDMS                                                 

Huma Mohr                                ds1                                                  

Walt Cordero MD MD rn Martinez, Maria                              [facility]                                                  

Luis F Cartagena RN RN bp Botello, Elizabeth eb Baxter, Mackenzie                            mb4                                                  

Earline Caballero RN RN   kd3                                                  

Nataly Orozco RN RN bo Heather Au-Stager RN ha                                                   

                                                                                                  

**************************************************************************************************

## 2021-12-31 NOTE — RAD REPORT
EXAM DESCRIPTION:  [place] Single View12/31/2021 11:53 am

 

CLINICAL HISTORY:  cough

 

COMPARISON:  Jan 2021

 

FINDINGS:  Lungs are mildly hazy which may indicate a mild pneumonia.

 

Heart is normal size

## 2021-12-31 NOTE — XMS REPORT
Continuity of Care Document

                          Created on:2021



Patient:MARIN LANGLEY

Sex:Female

:1955

External Reference #:[id number]





Demographics







                          Address                   [address]



                                                    [place] [address]

 

                          Home Phone                [phone]

 

                          Work Phone                [phone]

 

                          Mobile Phone              [phone]

 

                          Email Address             [email or web address]

 

                          Preferred Language        English

 

                          Marital Status            Unknown

 

                          Lutheran Affiliation     Unknown

 

                          Race                      Unknown

 

                          Additional Race(s)        Unavailable



                                                    White

 

                          Ethnic Group              Unknown









Author







                          Organization              [facility]

t

 

                          Address                   [address]



                                                    [place] [address]

 

                          Phone                     [phone]









Support







                Name            Relationship    Address         Phone

 

                YURY DEL ROSARIO   Life Partner    [address]    [phone]



                                                [place] [address] 

 

                LEIDY GARRETT Unavailable     Unavailable     Unavailable

 

                Kristy Luther     Child           [address] [phone]



                                                [place] [address] 









Care Team Providers







                    Name                Role                Phone

 

                    Luis F Honeycutt     Primary Care Physician [phone]

 

                    Jay NELSON              Attending Clinician [phone]

 

                    ABDOULAYE              Attending Clinician Unavailable

 

                    SHARON ULLOA   Attending Clinician Unavailable

 

                    KISHA                Attending Clinician Unavailable









Payers







           Payer Name Policy Type Policy Number Effective Date Expiration Date SHANTA gallagher

 

           [facility]       2          [id number] 2021 00:00:00            







Problems







       Condition Condition Condition Status Onset  Resolution Last   Treating Co

mments 

Source



       Name   Details Category        Date   Date   Treatment Clinician        



                                                 Date                 

 

       Background Background Disease Active                              U

nivers



       diabetic diabetic               3-06                               ity of



       retinopath retinopath               00:00:                             [place]

[place]



       y of right y of right               00                                 Me

dical



       eye    eye                                                     Branch



       determined determined                                                  



       by     by                                                      



       examinatio examinatio                                                  



       n      n                                                       

 

       RADHA (acute RADHA (acute Disease Active                              U

nivers



       kidney kidney               9-26                               ity of



       injury) injury)               00:00:                             [address]



                                                                      Branch

 

       Obesity Obesity Disease Active                              Univers



       (BMI   (BMI                 9-26                               ity of



       30-39.9) 30-39.9)               00:00:                             [address]



                                                                      Branch

 

       Thyromegal Thyromegal Disease Active                              U

nivers



       y      y                    6-                               ity of



                                   00:00:                             [address]



                                                                      Branch

 

       Callus of Callus of Disease Active                              Uni

vers



       foot   foot                                                ity of



                                   00:00:                             [place]



                                   00                                 Medical



                                                                      Branch

 

       Vitamin D Vitamin D Disease Active                              Uni

vers



       deficiency deficiency                                              it

y of



                                   00:00:                             [place]



                                                                    Medical



                                                                      Branch

 

       Uncontroll Uncontroll Disease Active                              U

nivers



       ed type 2 ed type 2                                              ity 

of



       diabetes diabetes               00:00:                             [place]



       with   with                 00                                 Medical



       neuropathy neuropathy                                                  Br

anch

 

       Essential Essential Disease Active                              Uni

vers



       hypertensi hypertensi                                              it

y of



       on, benign on, benign               00::                             Te

[place]



                                   00                                 Medical



                                                                      Branch

 

       Stage 3b Stage 3b Disease Active                              Unive

rs



       chronic chronic                                              ity of



       kidney kidney               00:00:                             [place]



       disease disease               00                                 Medical



                                                                      Branch

 

       Dyslipidem Dyslipidem Disease Active                              U

nivers



       ia     ia                                                  ity of



                                   00:00:                             [place]



                                   00                                 [facility]







Allergies, Adverse Reactions, Alerts

This patient has no known allergies or adverse reactions.



Social History







           Social Habit Start Date Stop Date  Quantity   Comments   Source

 

           Alcohol intake 2021 0 /d                  [facility] [place]



                      00:00:00   00:00:00                         [facility]

 

           Tobacco use and 2016 Never used            [facility]



           exposure   00:00:00   00:00:00                         [facility]

 

           Sex Assigned At 1955                       [facility]



           Birth      00:00:00   00:00:00                         [facility]









                Smoking Status  Start Date      Stop Date       Source

 

                Never smoker                                    [facility]







Medications







       Ordered Filled Start  Stop   Current Ordering Indication Dosage Frequency

 Signature

                    Comments            Components          Source



     Medication Medication Date Date Medication? Clinician                (SIG) 

          



     Name Name                                                   

 

     insulin      2021      Yes       [id number] 60U       inject 60           U

nivers



     degludec      2-03                               Units           ity of



     (TRESIBA      00:00:                               under the           [place]

[place]



     FLEXTOUCH      00                                 skin every           Medi

erna



     U-200) 200                                         morning.           Branc

h



     unit/mL (3                                         E11.65           



     mL) InPn                                                        

 

     TRESIBA      2021- No        [id number] 60U       INJECT 60           

Univers



     FLEXTOUCH      9-22 12-03                          UNITS           ity of



     U-200 200      00:00: 00:00                          UNDER THE           Te

[place]



     unit/mL (3      00   :00                           SKIN EVERY           Med

ical



     mL) InPn                                         MORNING.           Branch



                                                  E11.65           

 

     aspirin 81      2021-0      Yes            81mg      Take 81 mg           U

nivers



     mg chewable      6-29                               by mouth           ity 

of



     tablet      14:51:                               daily.           [place]



               07                                                Medical



                                                                 Branch

 

     gabapentin            Yes            300mg      Take 300           Un

kimi



      mg      6-29                               mg by           ity of



     tablet,      14:51:                               mouth           [place]



     extended      07                                 daily.           Medical



     release 24                                                        Branch



     hr                                                          

 

     furosemide            Yes                      every           Univer

s



     40 mg      6-29                               morning           ity of



     tablet      00:00:                               and            [place]



               00                                 evening.           Medical



                                                                 Branch

 

     insulin            Yes       [id number] 20U       inject 20           U

nivers



     aspart      6-29                               Units           ity of



     U-100      00:00:                               under the           [place]



     (NOVOLOG      00                                 skin 3           Medical



     FLEXPEN                                         (three)           Branch



     U-100                                         times           



     INSULIN)                                         daily           



     100 unit/mL                                         before           



     (3 mL)                                         meals.           



     injection                                         E11.65           

 

     spironolact            Yes            1{tbl}      Take 1           Un

kimi



     one 50 mg      6-25                               tablet by           ity o

f



     tablet      00:00:                               mouth.           [place]



               00                                                Medical



                                                                 Branch

 

     carvediloL            Yes            1{tbl}      Take 1           Uni

vers



     3.125 mg      6-23                               tablet by           ity of



     tablet      00:00:                               mouth.           [place]



               00                                                Medical



                                                                 Branch

 

     ATORVASTATI            Yes       [id number]           TAKE 1          

 Univers



     N 20 mg      4-23                               TABLET BY           ity of



     tablet      00:00:                               MOUTH           [place]



               00                                 EVERYDAY           Medical



                                                  AT BEDTIME           Branch

 

     HYDROcodone            Yes            1{tbl}      Take 1           Un

kimi



     -acetaminop      4-05                               tablet by           ity

 of



     hen 7.5-325      00:00:                               mouth.           [place]

[place]



     mg per      00                                                Medical



     tablet                                                        Branch

 

     ondansetron            Yes            1{tbl}      Take 1           Un

kimi



     4 mg      4-05                               tablet by           ity of



     disintegrat      00:00:                               mouth.           [place]

[place]



     ing tablet      00                                                Medical



                                                                 Branch

 

     losartan-hy            Yes                                     Univer

s



     drochloroth      2-24                                              ity of



     iazide      00:00:                                              [place]



     50-12.5 mg      00                                                Medical



     per tablet                                                        Branch

 

     potassium      2019      Yes                                     Univers



     chloride      0-31                                              ity of



     (KLOR-CON      00:00:                                              [place]



     10) 10 mEq      00                                                Medical



     CR tablet                                                        Branch

 

     Insulin            Yes       [id number]           Use as           Univ

ers



     [place],      7-02                               directed.           ity of



     Disposable,      00:00:                               4 times           Lorenzo

as



     (MIHIR PEN      00                                 daily. E           Medica

l



     NEEDLE) 32                                         11.40           Branch



     gauge x                                                        



     " Ndle                                                        

 

     amitriptyli            Yes                      TAKE 1           Univ

ers



     ne 25 mg      6-28                               TABLET BY           ity of



     tablet      00:00:                               MOUTH           [place]



               00                                 TWICE A           Medical



                                                  DAY            Branch

 

     amLODIPine            Yes            10mg      Take 10 mg           U

nivers



     10 mg      6-24                               by mouth           ity of



     tablet      00:00:                               daily.           [address]







Immunizations







           Ordered    Filled Immunization Date       Status     Comments   Sour

e



           Immunization Name Name                                        

 

           SARS-COV-2 COVID-19            2021 Completed             Unive

rsity of



           PFIZER VACCINE            00:00:00                         [facility]

 

           Influenza Virus            2020 Completed             Universit

y of



           Vaccine Quad .5 mL            00:00:00                         [facility] 6+ MO                                               [place]







Procedures

This patient has no known procedures.



Encounters







        Start   End     Encounter Admission Attending Care    Care    Encounter 

Source



        Date/Time Date/Time Type    Type    Clinicians Facility Department ID   

   

 

        2021 Refill          LAYNE Thompson    [id number]

02 [facility]



        00:00:00 00:00:00                 Juan REAVES [id number]         i

ty of



                                                [facility] [id number]         [place]

[place]



                                                PROFESSIO [phone]         Me

dical



                                                NAL     [address]             [place]



                                                BUILDING                 

 

        2021 Outpatient         ABEBA STANFORD  [id number]

[address]



        14:00:00 14:00:00                 JOSE ALFREDO wang

 

        2021 Outpatient         ABEBA ULLOA  [id number]

[address]



        13:30:00 13:30:00                 SASKIA Dockeryol

felipe

 

        2021 Outpatient         ABEBA BEARD  [id number]

[address]



        00:00:00 00:00:00                 ALFREDO kaufman

 

        2020 Office          LAYNE Thompson    [id number]

30 



        09:53:21 10:52:57 Visit           Juan Reaves [id number]         



                                                [place] [id number]         



                                                [place] [phone]         



                                                [facility]     [address]             



                                                [facility]                 







Results

This patient has no known results.

## 2021-12-31 NOTE — P.HP
Certification for Inpatient


Patient admitted to: Inpatient


With expected LOS: >2 Midnights


Patient will require the following post-hospital care: None


Practitioner: I am a practitioner with admitting privileges, knowledge of 

patient current condition, hospital course, and medical plan of care.


Services: Services provided to patient in accordance with Admission requirements

found in Title 42 Section 412.3 of the Code of Federal Regulations





Patient History


Date of Service: 12/31/21


Reason for admission: covid, hyperglycemia, dehydration


History of Present Illness: 


Ms. Faye is a 67 yo F with DM, HTN, CKD4 who presents with one day of AMS and 

increased confusion, combativeness and lethargy. Per family members, she has had

malaise, nausea, vomiting, diarrhea, cough and fever since Sunday. She has had a

poor appetite and has not been taking her medications. She tested positive for 

COVID today. Yesterday she fell, and she fell again this morning. She did not 

hit her head either time. She is oriented to person, place at bedside. Her 

family reports no confusion at baseline. Received fluids, steroids, insulin, 

hydralazine in the ED. Received 1mg of ativan for agitation.  Na 132 HCO3 15 BUN

40 Cr 2.52 GFR 19 Glu 388 troponin 0.07 procal 0.14. Some concern for findings 

on CT Head, but after discussion with neurology and neurosurgery, decided find

ings are insignificant at this time. 





CXR FINDINGS:  Lungs are mildly hazy which may indicate a mild pneumonia.


 


Heart is normal size





CT HEAD IMPRESSION:  Isolated prominence of the fourth ventricle. This probably 

is an insignificant finding. Alternatively this could indicate a trapped fourth 

ventricle secondary to obstructing hydrocephalus and should be correlated 

clinically


Allergies





No Known Drug Allergies Allergy (Verified 03/20/15 17:12)


   Unknown





Home Medications: 








Amlodipine Besylate 1 tab PO DAILY 03/20/15 


Gabapentin 1 cap PO DAILY 03/20/15 


Metformin HCl 2 tab PO BID 03/20/15 


Tramadol HCl 1 tab PO DAILY PRN 03/20/15 


Insulin Detemir [Levemir*] 35 unit SQ DAILY #30 ml 03/23/15 


Magnesium [Magnesium Gluconate] 500 mg PO TID #30 tablet 03/23/15 


Amlodipine [Norvasc] 5 mg PO DAILY #30 tab 01/27/21 


Furosemide [Lasix] 40 mg PO DAILY #30 tab 01/27/21 


Hydrocodone 5/APAP 325 [Norco 5/325*] 1 tab PO Q6H PRN #30 tab 01/27/21 


Spironolactone 50 mg PO DAILY #30 tablet 01/27/21 


carvediloL [Coreg] 6.25 mg PO BID #60 tab 01/27/21 








- Past Medical/Surgical History


Diabetic: Yes


-: Hypertension


-: Diabetes mellitus type 2-insulin dependent


-: CKD 4


-: CHF


-: stomach stapled


-: gastric bypass


-: tubes tied


-: cholectectomy


Psychosocial/ Personal History: Patient retired, lives with family





- Family History


  ** Father


-: Cancer





  ** Mother


-: Hypertension, Diabetes





  ** Brother


-: Diabetes





  ** Sister


-: Diabetes





- Social History


Smoking Status: Never smoker


Alcohol use: No


CD- Drugs: No


Caffeine use: Yes


Place of Residence: Home





Review of Systems


10-point ROS is otherwise unremarkable


General: Fever, Malaise


Respiratory: Cough


Gastrointestinal: Nausea, Vomiting, Diarrhea


Neurological: Confusion





Physical Examination





- Physical Exam


General: In no apparent distress


HEENT: Atraumatic, PERRLA, Mucous membr. moist/pink, EOMI, Sclerae nonicteric


Neck: Supple, 2+ carotid pulse no bruit, No LAD, Without JVD or thyroid 

abnormality


Respiratory: Diminished


Cardiovascular: No edema, Regular rate/rhythm, Normal S1 S2


Gastrointestinal: Normal bowel sounds, No tenderness


Musculoskeletal: No tenderness


Integumentary: No rashes


Neurological: Normal strength at 5/5 x4 extr, Normal tone, Abnormal affect


Lymphatics: No axilla or inguinal lymphadenopathy





- Studies


Laboratory Data (last 24 hrs)





12/31/21 12:09: Sodium 132 L, Potassium 4.0, BUN 40 H, Creatinine 2.52 H, 

Glucose 388 H, Total Bilirubin 0.6, AST 15, ALT 16, Alkaline Phosphatase 72, 

Lipase 141


12/31/21 11:10: PT 11.5, INR 1.00, APTT 20.9 L


12/31/21 11:10: WBC 9.40, Hgb 13.0, Hct 40.8, Plt Count 240








Assessment and Plan





- Problems (Diagnosis)


(1) COVID


Current Visit: Yes   Status: Acute   





(2) CHF (congestive heart failure)


Current Visit: Yes   Status: Chronic   


Qualifiers: 


   Heart failure type: diastolic   Heart failure chronicity: chronic   Qualified

 Code(s): I50.32 - Chronic diastolic (congestive) heart failure   





(3) Dehydration


Current Visit: Yes   Status: Acute   





(4) Renal failure


Current Visit: No   Status: Chronic   


Qualifiers: 


   Renal failure chronicity: chronic   Chronic kidney disease stage: stage 4 

(severe)   Qualified Code(s): N18.4 - Chronic kidney disease, stage 4 (severe)  

 





(5) Diabetes


Current Visit: No   Status: Chronic   


Qualifiers: 


   Diabetes mellitus type: type 2   Diabetes mellitus long term insulin use: 

unspecified long term insulin use status   Diabetes mellitus complication 

status: with hyperglycemia   Qualified Code(s): E11.65 - Type 2 diabetes 

mellitus with hyperglycemia   





(6) Hypertension


Current Visit: No   Status: Chronic   


Qualifiers: 


   Hypertension type: primary hypertension   Qualified Code(s): I10 - Essential 

(primary) hypertension   





- Plan


continue gentle IV fluid hydration


sliding scale insulin and accuchecks, A1c pending


trend troponins, repeat EKG ,ECHO pending


hydralazine PRN for BP spikes


continue IV steroids, covid supplements, daily xarelto


monitor renal function, may be at baseline 


reconcile and continue home medications


Discharge Plan: Home


Plan to discharge in: 48 Hours





- Advance Directives


Does patient have a Living Will: No


Does patient have a Durable POA for Healthcare: No





- Code Status/Comfort Care


Code Status Assessed: Yes (full code )


Critical Care: No


Time Spent Managing Pts Care (In Minutes): 70

## 2021-12-31 NOTE — RAD REPORT
EXAM DESCRIPTION:  CT - Head Brain Wo Cont - 12/31/2021 11:35 am

 

CLINICAL HISTORY:  Alteration of awareness/confusion

 

COMPARISON:  2014

 

TECHNIQUE:  Computed axial tomography of the head was obtained. IV contrast was not requested.

 

All CT scans are performed using dose optimization technique as appropriate and may include automated
 exposure control or mA/KV adjustment according to patient size.

 

FINDINGS:  An intracranial  bleed is not seen .

 

The fourth ventricle is prominent. The third and lateral ventricles are normal caliber.

 

No extra-axial fluid collection is noted.

 

Fluid within the sinuses/ mastoids is not seen.

 

IMPRESSION:  Isolated prominence of the fourth ventricle. This probably is an insignificant finding. 
Alternatively this could indicate a trapped fourth ventricle secondary to obstructing hydrocephalus a
nd should be correlated clinically

## 2021-12-31 NOTE — EDPHYS
Physician Documentation                                                                           

 [facility]                                                                 

Name: Josette Faye                                                                                 

Age: 66 yrs                                                                                       

Sex: Female                                                                                       

: 1955                                                                                   

MRN: [id number]                                                                                   

Arrival Date: 2021                                                                          

Time: 10:52                                                                                       

Account#: [id number]                                                                            

Bed 25                                                                                            

Private MD:                                                                                       

ED Physician Walt Cordero                                                                         

HPI:                                                                                              

                                                                                             

11:27 This 66 yrs old Female presents to ER via EMS with complaints of Altered Mental Status. rn  

11:27 The patient presents with confusion, disorientation. Onset: The symptoms/episode        rn  

      began/occurred yesterday. Possible causes: unknown. Associated signs and symptoms:          

      Pertinent positives: confusion, diarrhea, nausea, vomiting. Current symptoms: In the        

      emergency department the patient's symptoms are unchanged from the initial                  

      presentation. It is unknown whether or not the patient has had similar symptoms in the      

      past. It is unknown whether or not the patient has recently seen a physician. Per EMS,      

      patient with altered mental status since yesterday. Family member reported to EMS that      

      patient has been ill recently with vomiting/diarrhea/cough as well as fever. EMS            

      obtained a temperature of 100.9 axillary. Patient reports entire body hurts and hurts       

      all over. Reports cough as well as vomiting and diarrhea. Patient holding emesis bag.       

      Denies any recent head injury or trauma..                                                   

                                                                                                  

Historical:                                                                                       

- Allergies:                                                                                      

11:30 No Known Drug Allergies;                                                                bp  

- Home Meds:                                                                                      

11:30 amlodipine oral [Active]; Spironolactone Oral [Active]; gabapentin oral [Active];       bp  

      insulin regular human injection [Active]; lisinopril Oral [Active]; Metformin Oral          

      [Active]; Tramadol Oral [Active]; carvedilol oral [Active]; Furosemide Oral [Active];       

- PMHx:                                                                                           

11:30 Diabetes - IDDM; Hypertension;                                                          bp  

                                                                                                  

- Immunization history:: Adult Immunizations unknown.                                             

- Social history:: Smoking status: Patient denies any tobacco usage or history of.                

- Family history:: not pertinent.                                                                 

- Hospitalizations: : No recent hospitalization is reported.                                      

                                                                                                  

                                                                                                  

ROS:                                                                                              

11:27 Constitutional: Positive for fever Eyes: Negative for injury, pain, redness, and        rn  

      discharge, ENT: Negative for injury, pain, and discharge, Neck: Negative for injury,        

      pain, and swelling, Cardiovascular: Negative for chest pain, palpitations, and edema,       

      Respiratory: Positive for cough Abdomen/GI: Positive for nausea/vomiting/diarrhea Back:     

      Negative for injury and pain, : Negative for injury, bleeding, discharge, and             

      swelling, MS/Extremity: Negative for injury and deformity, Skin: Negative for injury,       

      rash, and discoloration, Neuro: Negative for headache, numbness, tingling, and seizure      

                                                                                                  

Exam:                                                                                             

11:27 Constitutional:  This is a well developed, well nourished patient who is awake, alert,  rn  

      seems altered but redirectable and attempts to answer questions Head/Face:                  

      Normocephalic, atraumatic. Eyes:  Periorbital areas with no swelling, redness, or           

      edema. ENT:  Dry mucous membranes, no stridor Neck:  Trachea midline, no masses             

      palpated, and no cervical lymphadenopathy.  Supple, full range of motion without nuchal     

      rigidity, or vertebral point tenderness.  No Meningismus. Cardiovascular:  Tachycardic,     

      regular.  No pulse deficits. Respiratory:  No increased work of breathing, no               

      retractions or nasal flaring. Abdomen/GI:  Soft, non-tender Skin:  Warm, dry, no            

      cellulitis MS/ Extremity:  Pulses equal, no cyanosis.  Neurovascular intact.  Full,         

      normal range of motion.  Equal circumference. Neuro:  Awake and alert, GCS 15, oriented     

      to person and place but not time.  Moves all 4 extremities with equal strength, 4 out       

      of 5.  Sensation intact throughout to painful stimuli.                                      

                                                                                                  

Vital Signs:                                                                                      

11:30  / 89; Pulse 107; Resp 17; Temp 98.5; Pulse Ox 97% ;                              bp  

14:47  / 93 LA Supine (auto/lg); Pulse 113 MON; Resp 18 S; Temp 98.2(A); Pulse Ox 96%   mb4 

      on R/A;                                                                                     

20:27  / 88; Pulse 110; Resp 20; Pulse Ox 97% on R/A;                                   miguel angel  

01/                                                                                             

01:38  / 75; Pulse 81; Resp 14; Pulse Ox 96% on R/A;                                    miguel angel  

02:53  / 98; Pulse 100; Resp 20; Pulse Ox 96% on R/A;                                   miguel angel  

04:30  / 87; Pulse 80; Resp 12; Pulse Ox 95% on R/A;                                    miguel angel  

06:22  / 92; Pulse 81; Resp 16; Pulse Ox 95% on R/A; Pain 0/10;                         miguel angel  

23:36  / 88; Pulse 91; Resp 18; Pulse Ox 97% on NC;                                     kd3 

                                                                                             

03:05  / 61; Pulse 75; Pulse Ox 91% on R/A;                                             kd3 

                                                                                                  

MDM:                                                                                              

                                                                                             

10:52 Patient medically screened.                                                             rn  

16:11 ED course: Venous blood gas has a pH of 7.35 with a bicarb of 16.5. Given these results rn  

      not indicative of DKA. Will send repeat BMP.                                                

16:11 Differential Diagnosis: CVA, electrolyte abnormality, hypoglycemia, pneumonia, TIA,     rn  

      UTI, volume depletion, dehydration, COVID, Flu, pneumonia. Data reviewed: vital signs,      

      nurses notes, lab test result(s), radiologic studies, and as a result, I will admit         

      patient. Counseling: I had a detailed discussion with the patient and/or guardian           

      regarding: the historical points, exam findings, and any diagnostic results supporting      

      the discharge/admit diagnosis, lab results, radiology results, the need to transfer to      

      another facility. Response to treatment: the patient's symptoms have mildly improved        

      after treatment. ED course: Patient is more alert and continues to fight for numerous       

      tests. Would not allow an ABG so we obtained a VBG. Will not allow urine cath. Refused      

      Covid test. Daughter contacted and she is on her way to help us convince patient to         

      obtain these tests. In the meantime I have consulted with Dr. Sen regarding fourth     

      ventricle prominence on CT head and he recommends transfer to [place] for neurosurgical     

      eval even though might be over read by radiology. Patient seems more systemic and           

      infectious in etiology with hyperglycemia and dehydration possibly causing metabolic        

      problems..                                                                                  

16:47 ED course: [facility] declines transfer 2/2 COVID + status..                             rn  

17:38 ED course: Patient was also declined at [facility] after discussion with          rn  

      neurosurgery. Neurosurgeon [facility] states that be isolated finding of fourth       

      ventricle prominence does not make sense without extension to the other ventricles and      

      does not believe patient has obstructive hydrocephalus and would treat the infectious       

      etiology and Covid and not do anything for the finding. I reconsulted with Dr. Sen     

      who agrees that this might be an incidental finding now with more information and Covid     

      positive, states will consult on patient. Spoke with Dr. Longoria and will admit here for     

      Covid, hyperglycemia, dehydration and renal insufficiency with metabolic derangement..      

17:41 ED course: Patient with agitation and nursing requests small dose of Ativan as she is   rn  

      not letting them put in a [place] catheter or care for her. Blood pressure okay. 1 mg         

      Ativan ordered.                                                                             

                                                                                                  

                                                                                             

10:58 Order name: Basic Metabolic Panel                                                       rn  

                                                                                             

10:58 Order name: Blood Culture Adult (2)                                                     rn  

                                                                                             

10:58 Order name: CBC with Diff                                                               rn  

                                                                                             

10:58 Order name: CPK                                                                         rn  

                                                                                             

10:58 Order name: LFT's; Complete Time: 12:56                                                 rn  

                                                                                             

10:58 Order name: Lactate; Complete Time: 12:01                                               rn  

                                                                                             

10:58 Order name: Lipase; Complete Time: 12:56                                                rn  

                                                                                             

10:58 Order name: Procalcitonin; Complete Time: 12:27                                         rn  

                                                                                             

10:58 Order name: Protime (+inr); Complete Time: 12:01                                        rn  

                                                                                             

10:58 Order name: Ptt, Activated; Complete Time: 12:01                                        rn  

                                                                                             

10:58 Order name: Troponin (emerg Dept Use Only); Complete Time: 12:56                        rn  

                                                                                             

10:58 Order name: Urine Microscopic Only                                                      rn  

                                                                                             

10:58 Order name: Basic Metabolic Panel; Complete Time: 12:56                                 EDMS

                                                                                             

10:58 Order name: Blood Culture                                                               EDMS

                                                                                             

10:58 Order name: CBC with Automated Diff; Complete Time: 12:01                               [facility]

                                                                                             

10:58 Order name: Creatine Phosphokinase; Complete Time: 12:56                                [facility]

                                                                                             

10:59 Order name: COVID-19/FLU A+B (Document "Date of Onset" if Symptomatic); Complete Time:  rn  

      17:10                                                                                       

                                                                                             

10:59 Order name: AMMONIA; Complete Time: 12:56                                               rn  

                                                                                             

12:09 Order name: Glucose, Ancillary Testing; Complete Time: 12:18                            [facility]MS

                                                                                             

14:03 Order name: ABG; Complete Time: 16:11                                                   rn  

                                                                                             

15:28 Order name: BMP                                                                         rn  

                                                                                             

03:03 Order name: CBC with Automated Diff                                                     EDMS

                                                                                             

03:09 Order name: NT PRO-BNP                                                                  EDMS

                                                                                             

03:18 Order name: Troponin I                                                                  EDMS

                                                                                             

03:18 Order name: C-Reactive Protein                                                          EDMS

                                                                                             

03:18 Order name: Ferritin                                                                    EDMS

                                                                                             

03:19 Order name: Comprehensive Metabolic Panel                                               EDMS

                                                                                             

03:19 Order name: Phosphorus                                                                  EDMS

                                                                                             

03:19 Order name: Lipid Profile                                                               EDMS

                                                                                             

10:58 Order name: Chest Single View XRAY; Complete Time: 12:03                                rn  

                                                                                             

10:58 Order name: Accucheck; Complete Time: 12:27                                             rn  

                                                                                             

10:58 Order name: Cardiac monitoring; Complete Time: 11:04                                    rn  

                                                                                             

10:58 Order name: EKG - Nurse/Tech; Complete Time: 12:27                                      rn  

                                                                                             

10:58 Order name: IV Saline Lock - Large Bore; Complete Time: 12:27                           rn  

                                                                                             

10:58 Order name: Labs collected and sent; Complete Time: 12:26                               rn  

                                                                                             

10:58 Order name: O2 Per Protocol; Complete Time: 11:03                                       rn  

                                                                                             

10:58 Order name: O2 Sat Monitoring; Complete Time: 11:03                                     rn  

                                                                                             

11:01 Order name: CT Head Brain wo Cont; Complete Time: 12:01                                 rn  

                                                                                             

03:19 Order name: T4 Free                                                                     EDMS

                                                                                             

03:19 Order name: Magnesium                                                                   EDMS

                                                                                             

03:19 Order name: Thyroid Stimulating Hormone                                                 EDMS

                                                                                             

03:25 Order name: Hemoglobin A1c                                                              EDMS

                                                                                             

06:01 Order name: Glucose, Ancillary Testing                                                  EDMS

                                                                                             

08:47 Order name: Glucose, Ancillary Testing                                                  EDMS

                                                                                             

10:30 Order name: Troponin I                                                                  EDMS

                                                                                             

11:24 Order name: Glucose, Ancillary Testing                                                  EDMS

                                                                                             

11:24 Order name: Acetone Level                                                               EDMS

                                                                                             

11:28 Order name: Basic Metabolic Panel                                                       EDMS

                                                                                             

17:33 Order name: Glucose, Ancillary Testing                                                  EDMS

                                                                                             

23:29 Order name: Glucose, Ancillary Testing                                                  EDMS

                                                                                             

04:28 Order name: CBC with Automated Diff                                                     EDMS

                                                                                             

04:44 Order name: Comprehensive Metabolic Panel                                               EDMS

                                                                                                  

Administered Medications:                                                                         

11:20 Drug: NS 0.9% 500 ml Route: IV; Rate: bolus; Site: left forearm;                        bp  

17:55 Follow up: IV Status: Completed infusion; IV Intake: 500ml                              bp  

11:20 Drug: Zofran (Ondansetron) 4 mg Route: IVP; Site: left forearm;                         bp  

14:32 Follow up: Response: No adverse reaction; Nausea is decreased                           bp  

11:20 Drug: hydrALAZINE 5 mg Route: IVP; Site: left forearm;                                  bp  

14:33 Follow up: Response: No adverse reaction                                                bp  

13:45 Drug: Insulin Regular Human 10 units {Co-Signature: marquis (Daria Tanner RN).} Route:  bp  

      Sub-Q; Site: left lower abdomen;                                                            

14:33 Follow up: Response: No adverse reaction                                                bp  

14:00 Drug: NS 0.9% 1000 ml Route: IV; Rate: 1000 ml; Site: left forearm;                     bp  

17:55 Follow up: IV Status: Completed infusion; IV Intake: 1000ml                             bp  

17:00 Drug: SOLU-Medrol (methylPrednisoLONE) 125 mg Route: IVP; Site: left forearm;           bp  

17:56 Follow up: Response: No adverse reaction                                                bp  

17:56 Drug: Ativan (LORazepam) 1 mg Route: IVP; Site: left forearm;                           bp  

                                                                                                  

                                                                                                  

Disposition Summary:                                                                              

21 17:43                                                                                    

Hospitalization Ordered                                                                           

      Hospitalization Status: Inpatient Admission                                             rn  

      Provider: Robb Longoria                                                                rn  

      Condition: Stable(21 17:43)                                                       rn  

      Problem: new(21 17:43)                                                            rn  

      Symptoms: have improved(21 17:43)                                                 rn  

      Bed/Room Type: Standard                                                                 rn  

      Location: Telemetry/MedSurg (Inpatient)(22 05:44)                                   

      Room Assignment: 416(22 05:44)                                                      

      Diagnosis                                                                                   

        - Pneumonia due to SARS-associated coronavirus                                        rn  

        - Dehydration                                                                         rn  

        - Hyperglycemia, unspecified                                                          rn  

        - Altered mental status, unspecified(21 17:43)                                  rn  

      Forms:                                                                                      

        - Medication Reconciliation Form                                                      rn  

        - SBAR form                                                                           rn  

Signatures:                                                                                       

Dispatcher [employer]                           EDMS                                                 

Johana Tim RN                        BHARAT   mw                                                   

Walt Cordero MD MD rn Peltier, Brian RN                      BHARAT cho                                                   

                                                                                                  

Corrections: (The following items were deleted from the chart)                                    

12:39 10:58 Urine Culture+BA.LAB.BRZ ordered. EDMS                                            EDMS

17:39 16:16  rn                                                                            rn  

17:39 16:16 [facility] rn                                          rn  

17:39 16:16 Higher level of care rn                                                           rn  

17:39 16:16 Stable rn                                                                         rn  

17:39 16:16 new rn                                                                            rn  

17:39 16:16 have improved rn                                                                  rn  

17:39 16:16 Altered mental status, unspecified rn                                             rn  

: 17:43 Telemetry/MedSurg (Inpatient) rn                                                  mw  

20: 17:43 rn                                                                                mw  

                                                                                             

05:44  20:07 [facility] ER HOLD mw                                                             mw  

                                                                                             

05:44  20:07 ERHOLD- mw                                                                  mw  

                                                                                                  

**************************************************************************************************

## 2022-01-01 LAB
ALBUMIN SERPL BCP-MCNC: 3 G/DL (ref 3.4–5)
ALP SERPL-CCNC: 71 U/L (ref 45–117)
ALT SERPL W P-5'-P-CCNC: 17 U/L (ref 12–78)
AST SERPL W P-5'-P-CCNC: 17 U/L (ref 15–37)
BUN BLD-MCNC: 43 MG/DL (ref 7–18)
BUN BLD-MCNC: 49 MG/DL (ref 7–18)
CRP SERPL-MCNC: 47 MG/L (ref ?–3)
FERRITIN SERPL-MCNC: 154.9 NG/ML (ref 8–388)
GLUCOSE SERPLBLD-MCNC: 396 MG/DL (ref 74–106)
GLUCOSE SERPLBLD-MCNC: 447 MG/DL (ref 74–106)
HCT VFR BLD CALC: 40.3 % (ref 36–45)
HDLC SERPL-MCNC: 37 MG/DL (ref 40–60)
LDLC SERPL CALC-MCNC: 167 MG/DL (ref ?–130)
LYMPHOCYTES # SPEC AUTO: 0.6 K/UL (ref 0.7–4.9)
MAGNESIUM SERPL-MCNC: 2.3 MG/DL (ref 1.8–2.4)
PMV BLD: 9.5 FL (ref 7.6–11.3)
POTASSIUM SERPL-SCNC: 4.3 MMOL/L (ref 3.5–5.1)
POTASSIUM SERPL-SCNC: 4.3 MMOL/L (ref 3.5–5.1)
RBC # BLD: 4.65 M/UL (ref 3.86–4.86)
TROPONIN I: 0.56 NG/ML (ref 0–0.04)
TSH SERPL DL<=0.05 MIU/L-ACNC: 0.85 UIU/ML (ref 0.36–3.74)

## 2022-01-01 RX ADMIN — Medication SCH MG: at 21:00

## 2022-01-01 RX ADMIN — Medication SCH: at 01:06

## 2022-01-01 RX ADMIN — HUMAN INSULIN SCH: 100 INJECTION, SOLUTION SUBCUTANEOUS at 21:00

## 2022-01-01 RX ADMIN — Medication SCH MG: at 09:29

## 2022-01-01 RX ADMIN — Medication SCH ML: at 09:00

## 2022-01-01 RX ADMIN — ACETAMINOPHEN PRN MG: 500 TABLET, FILM COATED ORAL at 15:38

## 2022-01-01 RX ADMIN — HUMAN INSULIN SCH UNIT: 100 INJECTION, SOLUTION SUBCUTANEOUS at 17:39

## 2022-01-01 RX ADMIN — METHYLPREDNISOLONE SODIUM SUCCINATE SCH: 125 INJECTION, POWDER, FOR SOLUTION INTRAMUSCULAR; INTRAVENOUS at 01:06

## 2022-01-01 RX ADMIN — Medication SCH ML: at 21:00

## 2022-01-01 RX ADMIN — SODIUM CHLORIDE SCH: 0.9 INJECTION, SOLUTION INTRAVENOUS at 14:26

## 2022-01-01 RX ADMIN — Medication SCH MG: at 17:39

## 2022-01-01 RX ADMIN — FAMOTIDINE SCH MG: 20 TABLET, FILM COATED ORAL at 09:29

## 2022-01-01 RX ADMIN — ASPIRIN SCH MG: 81 TABLET, COATED ORAL at 09:40

## 2022-01-01 RX ADMIN — SODIUM CHLORIDE SCH MLS: 0.9 INJECTION, SOLUTION INTRAVENOUS at 09:27

## 2022-01-01 RX ADMIN — METHYLPREDNISOLONE SODIUM SUCCINATE SCH MG: 125 INJECTION, POWDER, FOR SOLUTION INTRAMUSCULAR; INTRAVENOUS at 09:30

## 2022-01-01 RX ADMIN — HUMAN INSULIN SCH UNIT: 100 INJECTION, SOLUTION SUBCUTANEOUS at 09:30

## 2022-01-01 RX ADMIN — FAMOTIDINE SCH: 20 TABLET, FILM COATED ORAL at 01:06

## 2022-01-01 RX ADMIN — CHOLECALCIFEROL TAB 25 MCG (1000 UNIT) SCH UNIT: 25 TAB at 09:28

## 2022-01-01 RX ADMIN — ATORVASTATIN CALCIUM SCH MG: 40 TABLET, FILM COATED ORAL at 21:00

## 2022-01-01 RX ADMIN — ZINC SULFATE CAP 220 MG (50 MG ELEMENTAL ZN) SCH MG: 220 (50 ZN) CAP at 09:28

## 2022-01-01 RX ADMIN — HUMAN INSULIN SCH UNIT: 100 INJECTION, SOLUTION SUBCUTANEOUS at 01:06

## 2022-01-01 RX ADMIN — HUMAN INSULIN SCH UNIT: 100 INJECTION, SOLUTION SUBCUTANEOUS at 12:19

## 2022-01-01 RX ADMIN — METHYLPREDNISOLONE SODIUM SUCCINATE SCH MG: 125 INJECTION, POWDER, FOR SOLUTION INTRAMUSCULAR; INTRAVENOUS at 21:00

## 2022-01-01 RX ADMIN — Medication SCH MG: at 12:20

## 2022-01-01 NOTE — P.PN
Subjective


Date of Service: 01/01/22


Chief Complaint: covid, hyperglycemia, dehydration


Patient more awake this morning.


Nursing staff report she was combative last night.


Blood sugar readings improved but still significantly hyperglycemic.


She is tolerating room air.











Physical Examination





- Vital Signs


Temperature: 97 F


Blood Pressure: 148/85


Pulse: 95


Respirations: 18


Pulse Ox (%): 96





- Physical Exam


General: Alert, In no apparent distress, Oriented x3


HEENT: Mucous membr. moist/pink


Neck: JVD not distended


Respiratory: Clear to auscultation bilaterally, Normal air movement


Cardiovascular: No edema, Regular rate/rhythm, Normal S1 S2


Gastrointestinal: Normal bowel sounds, Soft and benign, Non-distended, No 

tenderness


Musculoskeletal: No swelling


Integumentary: No rashes


Neurological: Normal strength at 5/5 x4 extr





Assessment And Plan





- Current Problems (Diagnosis)


(1) Type 2 diabetes mellitus with hyperglycemia


Current Visit: Yes   Status: Acute   





(2) Metabolic acidosis


Current Visit: Yes   Status: Acute   





(3) COVID


Current Visit: Yes   Status: Acute   





(4) Chronic kidney disease, stage IV (severe)


Current Visit: Yes   Status: Acute   





(5) NSTEMI (non-ST elevated myocardial infarction)


Current Visit: Yes   Status: Acute   





(6) Acute encephalopathy


Current Visit: Yes   Status: Acute   





(7) Distension of cerebral ventricle


Current Visit: Yes   Status: Acute   





- Plan


Patient mental status improved.


Acute encephalopathy suspected to be secondary to hyperglycemia.


Neurology input appreciated.


Possibility of obstructive hydrocephalus given prominence fourth ventricle as 

read by the CT scan.


I spoke to neurology at [facility] was accepted patient for transfer.


I spoke to hospitalist at [facility] was also accepted the patient for 

transfer.


Managed hypoglycemia with Lantus insulin and insulin sliding scale.


Continue IV hydration to treat metabolic acidosis


Patient being anticoagulated for NSTEMI.


Elevated troponin secondary to acute viral myocarditis not ruled out.


Also on aspirin and Coreg


Cardiology consult


Echocardiogram is pending.


Patient is stable on room air.  Checks x-ray showing mild COVID pneumonia.


Continue steroid and vitamins.

## 2022-01-01 NOTE — CON
Date of Consultation:  01/01/2022



Reason For Consultation:  Elevated troponin.



History Of Present Illness:  This is a 66-year-old female, history of diabetes, hypertension, chronic
 kidney disease, presents with altered mental status, confusion, lethargy, has been having nausea, vo
miting, diarrhea, cough, fever since Sunday, poor appetite, not eating and drinking, tested positive 
for COVID.  Denies having any chest pain.  Evaluated by bedside.  She appears to be pleasantly confus
ed and no other complaints.



Past Medical History:  As outlined above in the HPI.



Medications:  Refer reconciliation sheet for details.



Allergies:  NO KNOWN DRUG ALLERGIES.



Family History:  No premature coronary artery disease or cancer.



Social History:  Does not smoke or drink.  Does not use any drugs.



Review of Systems:

All systems reviewed and they were negative except for mentioned in HPI.



Physical Examination:

Vital Signs:  Reviewed. 

Head and Neck:  Pupils are equal, reactive to light.  Intact eye movements.  No JVD.  No cervical lym
phadenopathy. 

Neck:  Supple.  Thyroid is not enlarged. 

Lungs:  Clear to auscultation bilaterally.  No rhonchi, rales, or crackles.  No accessory muscle use.
 

Heart:  Regular rate and rhythm.  No extra sounds. 

Abdomen:  Soft, nontender.  Bowel sounds positive.  No organomegaly.  No masses or hernia.  No rigidi
ty or rebound. 

Extremities:  No clubbing, cyanosis.  Intact pulses. 

Skin:  No rashes. 

Neurologic:  Alert, awake.  No acute focal deficits appreciated.



Investigations:  Hemoglobin is 12.5, white blood cell count is 9.3, BUN is 49, creatinine 2.44 with a
 sugar of 447.  CO2 level is 16.  Troponin 0.8.



Assessment And Recommendations:  

1.Elevated troponin symptoms likely due to the demand ischemia and being positive for COVID itself. 
 Obtain echocardiogram on Monday and once her COVID status is cleared, then a nuclear stress test is 
recommended.

2.Mild metabolic acidosis, likely due to diabetic ketoacidosis.  The patient is being managed by the
 hospitalist for this issue.





/SHAMA

DD:  01/01/2022 13:41:05Voice ID:  [id number]

DT:  01/01/2022 16:27:41Report ID:  [id number]

## 2022-01-02 LAB
ALBUMIN SERPL BCP-MCNC: 2.4 G/DL (ref 3.4–5)
ALP SERPL-CCNC: 57 U/L (ref 45–117)
ALT SERPL W P-5'-P-CCNC: 14 U/L (ref 12–78)
AST SERPL W P-5'-P-CCNC: 19 U/L (ref 15–37)
BUN BLD-MCNC: 54 MG/DL (ref 7–18)
GLUCOSE SERPLBLD-MCNC: 199 MG/DL (ref 74–106)
HCT VFR BLD CALC: 37.3 % (ref 36–45)
LYMPHOCYTES # SPEC AUTO: 1 K/UL (ref 0.7–4.9)
MORPHOLOGY BLD-IMP: (no result)
PMV BLD: 9.6 FL (ref 7.6–11.3)
POTASSIUM SERPL-SCNC: 4.3 MMOL/L (ref 3.5–5.1)
RBC # BLD: 4.35 M/UL (ref 3.86–4.86)

## 2022-01-02 RX ADMIN — CHOLECALCIFEROL TAB 25 MCG (1000 UNIT) SCH UNIT: 25 TAB at 08:08

## 2022-01-02 RX ADMIN — Medication SCH MG: at 12:00

## 2022-01-02 RX ADMIN — ZINC SULFATE CAP 220 MG (50 MG ELEMENTAL ZN) SCH MG: 220 (50 ZN) CAP at 08:04

## 2022-01-02 RX ADMIN — GABAPENTIN SCH MG: 300 CAPSULE ORAL at 15:30

## 2022-01-02 RX ADMIN — Medication SCH MG: at 16:23

## 2022-01-02 RX ADMIN — HEPARIN SODIUM AND DEXTROSE SCH MLS: 5000; 5 INJECTION INTRAVENOUS at 09:35

## 2022-01-02 RX ADMIN — SODIUM CHLORIDE SCH MLS: 0.9 INJECTION, SOLUTION INTRAVENOUS at 03:46

## 2022-01-02 RX ADMIN — METHYLPREDNISOLONE SODIUM SUCCINATE SCH MG: 125 INJECTION, POWDER, FOR SOLUTION INTRAMUSCULAR; INTRAVENOUS at 08:03

## 2022-01-02 RX ADMIN — HUMAN INSULIN SCH UNIT: 100 INJECTION, SOLUTION SUBCUTANEOUS at 11:56

## 2022-01-02 RX ADMIN — Medication SCH MG: at 08:06

## 2022-01-02 RX ADMIN — SODIUM CHLORIDE SCH MLS: 0.9 INJECTION, SOLUTION INTRAVENOUS at 16:23

## 2022-01-02 RX ADMIN — METHYLPREDNISOLONE SODIUM SUCCINATE SCH MG: 125 INJECTION, POWDER, FOR SOLUTION INTRAMUSCULAR; INTRAVENOUS at 20:12

## 2022-01-02 RX ADMIN — INSULIN GLARGINE SCH UNIT: 100 INJECTION, SOLUTION SUBCUTANEOUS at 12:34

## 2022-01-02 RX ADMIN — HUMAN INSULIN SCH UNIT: 100 INJECTION, SOLUTION SUBCUTANEOUS at 08:02

## 2022-01-02 RX ADMIN — ASPIRIN SCH MG: 81 TABLET, COATED ORAL at 08:04

## 2022-01-02 RX ADMIN — Medication SCH: at 08:00

## 2022-01-02 RX ADMIN — ATORVASTATIN CALCIUM SCH MG: 40 TABLET, FILM COATED ORAL at 20:11

## 2022-01-02 RX ADMIN — Medication SCH MG: at 08:03

## 2022-01-02 RX ADMIN — Medication SCH MG: at 20:11

## 2022-01-02 RX ADMIN — FAMOTIDINE SCH MG: 20 TABLET, FILM COATED ORAL at 08:06

## 2022-01-02 RX ADMIN — Medication SCH ML: at 20:12

## 2022-01-02 RX ADMIN — HUMAN INSULIN SCH UNIT: 100 INJECTION, SOLUTION SUBCUTANEOUS at 20:10

## 2022-01-02 RX ADMIN — GABAPENTIN SCH MG: 300 CAPSULE ORAL at 20:11

## 2022-01-02 RX ADMIN — HUMAN INSULIN SCH UNIT: 100 INJECTION, SOLUTION SUBCUTANEOUS at 16:22

## 2022-01-02 NOTE — P.PN
Subjective


Date of Service: 01/02/22


Chief Complaint: covid, hyperglycemia, dehydration


Patient more awake and alert.  She is oriented x3.


She has no complaint.


Blood sugar readings improved but still significantly hyperglycemic.


She is tolerating room air.


Blood sugar in the 300s since this morning.











Physical Examination





- Vital Signs


Temperature: 96.9 F


Blood Pressure: 132/70


Pulse: 81


Respirations: 18


Pulse Ox (%): 92





- Physical Exam


General: Alert, In no apparent distress, Oriented x3


HEENT: Mucous membr. moist/pink


Neck: JVD not distended


Respiratory: Clear to auscultation bilaterally, Other (Nonlabored breathing)


Cardiovascular: No edema, Regular rate/rhythm, Normal S1 S2


Gastrointestinal: Soft and benign, Non-distended, No tenderness


Musculoskeletal: No swelling


Integumentary: No rashes


Neurological: Normal strength at 5/5 x4 extr





Assessment And Plan





- Current Problems (Diagnosis)


(1) Type 2 diabetes mellitus with hyperglycemia


Current Visit: Yes   Status: Acute   





(2) Metabolic acidosis


Current Visit: Yes   Status: Acute   





(3) COVID


Current Visit: Yes   Status: Acute   





(4) Chronic kidney disease, stage IV (severe)


Current Visit: Yes   Status: Acute   





(5) NSTEMI (non-ST elevated myocardial infarction)


Current Visit: Yes   Status: Acute   





(6) Acute encephalopathy


Current Visit: Yes   Status: Acute   





(7) Distension of cerebral ventricle


Current Visit: Yes   Status: Acute   





- Plan


AMS resolved.


Acute encephalopathy suspected to be secondary to hyperglycemia.


Neurology input appreciated.


Possibility of obstructive hydrocephalus given prominence fourth ventricle as re

ad by the CT scan.


I spoke to neurology at [facility] was accepted patient for transfer.


I spoke to hospitalist at [facility] was also accepted the patient for 

transfer.


Continue insulin sliding scale and titrate Lantus insulin for hyperglycemia.


Continue IV hydration to treat metabolic acidosis


Patient being anticoagulated for NSTEMI with heparin drip.


Elevated troponin secondary to acute viral myocarditis not ruled out.


Continue aspirin and Coreg


Cardiology consult


Echocardiogram is pending.


Patient is stable on room air.  Chest x-ray showing mild COVID pneumonia.


Continue steroid and vitamins.


No significant change in renal function.

## 2022-01-03 LAB
ALBUMIN SERPL BCP-MCNC: 2 G/DL (ref 3.4–5)
ALP SERPL-CCNC: 53 U/L (ref 45–117)
ALT SERPL W P-5'-P-CCNC: 12 U/L (ref 12–78)
AST SERPL W P-5'-P-CCNC: 17 U/L (ref 15–37)
BUN BLD-MCNC: 69 MG/DL (ref 7–18)
GLUCOSE SERPLBLD-MCNC: 274 MG/DL (ref 74–106)
HCT VFR BLD CALC: 36.1 % (ref 36–45)
LYMPHOCYTES # SPEC AUTO: 0.8 K/UL (ref 0.7–4.9)
PMV BLD: 9.9 FL (ref 7.6–11.3)
POTASSIUM SERPL-SCNC: 4.2 MMOL/L (ref 3.5–5.1)
RBC # BLD: 4.24 M/UL (ref 3.86–4.86)

## 2022-01-03 RX ADMIN — METHYLPREDNISOLONE SODIUM SUCCINATE SCH MG: 125 INJECTION, POWDER, FOR SOLUTION INTRAMUSCULAR; INTRAVENOUS at 08:18

## 2022-01-03 RX ADMIN — Medication SCH MG: at 08:17

## 2022-01-03 RX ADMIN — Medication SCH ML: at 08:15

## 2022-01-03 RX ADMIN — SODIUM CHLORIDE SCH MLS: 0.9 INJECTION, SOLUTION INTRAVENOUS at 21:17

## 2022-01-03 RX ADMIN — HUMAN INSULIN SCH UNIT: 100 INJECTION, SOLUTION SUBCUTANEOUS at 21:20

## 2022-01-03 RX ADMIN — ASPIRIN SCH MG: 81 TABLET, COATED ORAL at 08:17

## 2022-01-03 RX ADMIN — HEPARIN SODIUM AND DEXTROSE SCH MLS: 5000; 5 INJECTION INTRAVENOUS at 08:12

## 2022-01-03 RX ADMIN — Medication SCH ML: at 21:00

## 2022-01-03 RX ADMIN — HUMAN INSULIN SCH UNIT: 100 INJECTION, SOLUTION SUBCUTANEOUS at 08:13

## 2022-01-03 RX ADMIN — Medication SCH MG: at 16:31

## 2022-01-03 RX ADMIN — GABAPENTIN SCH MG: 300 CAPSULE ORAL at 08:17

## 2022-01-03 RX ADMIN — FAMOTIDINE SCH MG: 20 TABLET, FILM COATED ORAL at 08:17

## 2022-01-03 RX ADMIN — Medication SCH MG: at 21:19

## 2022-01-03 RX ADMIN — HYDRALAZINE HYDROCHLORIDE PRN MG: 20 INJECTION INTRAMUSCULAR; INTRAVENOUS at 21:19

## 2022-01-03 RX ADMIN — HUMAN INSULIN SCH UNIT: 100 INJECTION, SOLUTION SUBCUTANEOUS at 16:31

## 2022-01-03 RX ADMIN — ATORVASTATIN CALCIUM SCH MG: 40 TABLET, FILM COATED ORAL at 21:20

## 2022-01-03 RX ADMIN — CHOLECALCIFEROL TAB 25 MCG (1000 UNIT) SCH UNIT: 25 TAB at 08:15

## 2022-01-03 RX ADMIN — ZINC SULFATE CAP 220 MG (50 MG ELEMENTAL ZN) SCH MG: 220 (50 ZN) CAP at 08:16

## 2022-01-03 RX ADMIN — GABAPENTIN SCH MG: 300 CAPSULE ORAL at 21:20

## 2022-01-03 RX ADMIN — SODIUM CHLORIDE SCH MLS: 0.9 INJECTION, SOLUTION INTRAVENOUS at 08:17

## 2022-01-03 RX ADMIN — ACETAMINOPHEN PRN MG: 500 TABLET, FILM COATED ORAL at 22:41

## 2022-01-03 RX ADMIN — INSULIN GLARGINE SCH UNIT: 100 INJECTION, SOLUTION SUBCUTANEOUS at 08:14

## 2022-01-03 RX ADMIN — Medication SCH MG: at 12:26

## 2022-01-03 RX ADMIN — HUMAN INSULIN SCH UNIT: 100 INJECTION, SOLUTION SUBCUTANEOUS at 11:41

## 2022-01-03 RX ADMIN — SODIUM BICARBONATE TAB 325 MG SCH MG: 325 TAB at 16:31

## 2022-01-03 NOTE — PN
Date of Progress Note:  01/03/2022



The patient was admitted on 12/31/2021 with COVID pneumonia and elevated troponin.  She was admitted 
to Dr. Longoria.  Dr. Thompson has been following the patient.  The patient is asymptomatic today from a 
heart standpoint.  Denied shortness of breath.  Denied any chest pain.  Vital signs are stable, afebr
ile, sinus rhythm.  Her workup for today include an echocardiogram.  We will see what that shows befo
re making further decisions.  I think an outpatient stress test down the road would be reasonable.  W
e still think her elevated troponin secondary to demand ischemia from the COVID pneumonia.  Case was 
discussed with Dr. Longoria.





NB/SHAMA

DD:  01/03/2022 19:06:09Voice ID:  [id number]

DT:  01/03/2022 22:27:25Report ID:  [id number]

## 2022-01-03 NOTE — P.CNS
Date of Consult: 01/03/22


Reason for Consult: RADHA/ CKD


Requesting Physician: lilly donis


Chief Complaint: covid, hyperglycemia, dehydration


History of Present Illness: 


67 yo WF CKD, DM, HTN presented to the ER with moderate, progressive AMS.  

Reports intermittent ibuprofen.  No difficulty with urination.  She follows with

Dr. Mcbride but has missed several visits.





Ms. Faye is a 67 yo F with DM, HTN, CKD4 who presents with one day of AMS and 

increased confusion, combativeness and lethargy. Per family members, she has had

malaise, nausea, vomiting, diarrhea, cough and fever since Sunday. She has had a

poor appetite and has not been taking her medications. She tested positive for 

COVID today. Yesterday she fell, and she fell again this morning. She did not 

hit her head either time. She is oriented to person, place at bedside. Her 

family reports no confusion at baseline. Received fluids, steroids, insulin, 

hydralazine in the ED. Received 1mg of ativan for agitation.  Na 132 HCO3 15 BUN

40 Cr 2.52 GFR 19 Glu 388 troponin 0.07 procal 0.14. Some concern for findings 

on CT Head, but after discussion with neurology and neurosurgery, decided 

findings are insignificant at this time. 





11:27 This 66 yrs old Female presents to ER via EMS with complaints of Altered 

Mental Status. rn  


11:27 The patient presents with confusion, disorientation. Onset: The 

symptoms/episode        rn  


      began/occurred yesterday. Possible causes: unknown. Associated signs and 

symptoms:          


      Pertinent positives: confusion, diarrhea, nausea, vomiting. Current 

symptoms: In the        


      emergency department the patient's symptoms are unchanged from the initial

                 


      presentation. It is unknown whether or not the patient has had similar 

symptoms in the      


      past. It is unknown whether or not the patient has recently seen a 

physician. Per EMS,      


      patient with altered mental status since yesterday. Family member reported

to EMS that      


      patient has been ill recently with vomiting/diarrhea/cough as well as 

fever. EMS            


      obtained a temperature of 100.9 axillary. Patient reports entire body 

hurts and hurts       


      all over. Reports cough as well as vomiting and diarrhea. Patient holding 

emesis bag.       


      Denies any recent head injury or trauma..                                 

                 


Allergies





No Known Drug Allergies Allergy (Verified 03/20/15 17:12)


   Unknown





Home medications list reviewed: Yes


Home Medications: 








Amlodipine [Norvasc] 5 mg PO DAILY 01/02/22 


Atorvastatin Calcium [Lipitor] 20 mg PO BEDTIME 01/02/22 


Furosemide [Lasix] 40 mg PO BIDL 01/02/22 


Gabapentin 300 mg PO BID 01/02/22 


Insulin Aspart [Novolog Flexpen] 20 unit SQ TIDWM 01/02/22 


Insulin Degludec [Tresiba] 60 unit SQ DAILY 01/02/22 


Spironolactone 50 mg PO DAILY 01/02/22 


carvediloL [Coreg] 3.125 mg PO BID 01/02/22 








- Past Medical/Surgical History


Diabetic: Yes


-: Hypertension


-: Diabetes mellitus type 2-insulin dependent


-: CKD 4


-: CHF


-: stomach stapled


-: gastric bypass


-: tubes tied


-: cholectectomy


Psychosocial/ Personal History: Patient retired, lives with family





- Family History


  ** Father


Medical History: Cancer





  ** Mother


Medical History: Hypertension, Diabetes





  ** Brother


Medical History: Diabetes





  ** Sister


Medical History: Diabetes





- Social History


Alcohol use: No


CD- Drugs: No


Caffeine use: Yes


Place of Residence: Home





Review of Systems


10-point ROS is otherwise unremarkable


General: Weakness





Physical Examination














Temp Pulse Resp BP Pulse Ox


 


 97.1 F   66   16   135/63   92 


 


 01/03/22 08:00  01/03/22 08:16  01/03/22 08:00  01/03/22 08:16  01/03/22 08:00








General: In no apparent distress, Oriented x3, Cooperative


HEENT: Atraumatic


Neck: Supple


Respiratory: Clear to auscultation bilaterally


Cardiovascular: No edema, Regular rate/rhythm


Gastrointestinal: Soft and benign, Non-distended


Musculoskeletal: No clubbing, No contractures


Integumentary: No rashes, No cyanosis


Neurological: Normal speech


Blood work reviewed in the chart.


Imagings Data: 


EXAM DESCRIPTION:  [place] Single View12/31/2021 11:53 am


CLINICAL HISTORY:  cough


COMPARISON:  Jan 2021


FINDINGS:  Lungs are mildly hazy which may indicate a mild pneumonia.


Heart is normal size


Conclusions/Impression: 





RADHA in the setting of intermittent ibuprofen


CKD IV with proteinuria


-No NSAIDs





Hyponatremia


-Continue IVF





Acidosis


-Start oral bicarb





HTN with CKD/ CHF


-Continue Coreg





Diastolic CHF, chronic


-Low sodium diet





DM II with CKD & Hyperglycemia


-Continue Lantus


-RISS





Moderate malnutrition


-Recommend protein supplementation





Anemia in chronic illness


-Monitor H&H





CKD MBD


-Continue Vitamin D3





Thank you kindly for the consultation.


Case reviewed with Dr. Donis

## 2022-01-03 NOTE — ECHO
HEIGHT: 5 ft 8 in   WEIGHT: 240 lb 0 oz   DATE OF STUDY: 01/03/2022   REFER DR: Akin Pal

2-DIMENSIONAL: YES

     M.MODE: YES

 DOPPLER: YES

COLOR FLOW: YES



                    TDS:  

PORTABLE: 

 DEFINITY:  

BUBBLE STUDY: 





DIAGNOSIS:  ELEVATED TROPONIN



CARDIAC HISTORY:  

CATHERIZATION: 

SURGERY: 

PROSTHETIC VALVE: 

PACEMAKER: 





MEASUREMENTS (cm)

    DIASTOLIC (NORMALS)                 SYSTOLIC (NORMALS)

IVSd                 1.1 (0.6-1.2)                    LA Diam 3.4 (1.9-4.0)     LVEF       
  50%  

LVIDd               4.8 (3.5-5.7)                        LVIDs      3.6 (2.0-3.5)     %FS  
        25%

LVPWd             1.2 (0.6-1.2)

Ao Diam           2.7 (2.0-3.7)



2 DIMENSIONAL ASSESSMENT:

RIGHT ATRIUM:                   NORMAL

LEFT ATRIUM:       NORMAL



RIGHT VENTRICLE:            NORMAL

LEFT VENTRICLE: NORMAL



TRICUSPID VALVE:  MILD TRICUSPID REGURGITATION

MITRAL VALVE:     MITRAL ANNULAR CALCIFICATION



PULMONIC VALVE:             NORMAL

AORTIC VALVE:     NORMAL



PERICARDIAL EFFUSION: NONE

AORTIC ROOT:      NORMAL





LEFT VENTRICULAR WALL MOTION:     NORMAL



DOPPLER/COLOR FLOW:     SEE BELOW



COMMENTS:      NORMAL LEFT VENTRICULAR EJECTION FRACTION 55-60%.  NORMAL WALL MOTION.  

                            MILD TRICUSPID REGURGIATION.



TECHNOLOGIST:   DOUG MORENO

## 2022-01-03 NOTE — P.PN
Subjective


Date of Service: 01/03/22


Chief Complaint: covid, hyperglycemia, dehydration


Patient is awake and alert.  She is oriented x3.


She has no complaint.


She is tolerating room air.


Patient states she feels she is back to baseline.











Physical Examination





- Vital Signs


Temperature: 98.6 F


Blood Pressure: 129/66


Pulse: 72


Respirations: 16


Pulse Ox (%): 90





- Physical Exam


General: Alert, In no apparent distress, Oriented x3


HEENT: Mucous membr. moist/pink


Neck: JVD not distended


Respiratory: Clear to auscultation bilaterally, Normal air movement


Cardiovascular: No edema, Regular rate/rhythm, Normal S1 S2


Gastrointestinal: Normal bowel sounds, Soft and benign, Non-distended


Musculoskeletal: No swelling


Integumentary: No rashes


Neurological: Other (No focal motor deficit)





Assessment And Plan





- Current Problems (Diagnosis)


(1) Type 2 diabetes mellitus with hyperglycemia


Current Visit: Yes   Status: Acute   





(2) Metabolic acidosis


Current Visit: Yes   Status: Acute   





(3) COVID


Current Visit: Yes   Status: Acute   





(4) Chronic kidney disease, stage IV (severe)


Current Visit: Yes   Status: Acute   





(5) NSTEMI (non-ST elevated myocardial infarction)


Current Visit: Yes   Status: Acute   





(6) Acute encephalopathy


Current Visit: Yes   Status: Acute   





(7) Distension of cerebral ventricle


Current Visit: Yes   Status: Acute   





- Plan


AMS resolved.


Acute encephalopathy suspected to be secondary to hyperglycemia.


Neurology seen patient.


Possibility of obstructive hydrocephalus given prominence fourth ventricle as 

read by the CT scan.


I spoke to neurology at [facility] was initially accepted patient for 

transfer but no bed availability


At this point most of her neurologist symptoms have resolved and Dr. Sen 

recommend further work-up as outpatient.


Discontinue [place] catheter.  PT to assess gait and ambulation.


Continue insulin sliding scale and titrate Lantus insulin for hyperglycemia.


Metabolic acidosis is likely secondary to CKD.


Nephrology consulted, patient seen by Dr. Ferrer and started on oral bicarb 

replacement.  Patient has CKD stage IV.


Cardiology-Dr. Oneil's input appreciated.  He recommend outpatient stress 

test. 


Elevated troponin secondary to acute viral myocarditis not ruled out.  Patient 

is currently asymptomatic.


Continue aspirin and Coreg


Echocardiogram result is pending.


Patient is stable on room air.  Chest x-ray showing mild COVID pneumonia.


Continue steroid and vitamins.


No significant change in renal function.

## 2022-01-04 LAB
ALBUMIN SERPL BCP-MCNC: 1.9 G/DL (ref 3.4–5)
ALP SERPL-CCNC: 53 U/L (ref 45–117)
ALT SERPL W P-5'-P-CCNC: 11 U/L (ref 12–78)
AST SERPL W P-5'-P-CCNC: 14 U/L (ref 15–37)
BUN BLD-MCNC: 68 MG/DL (ref 7–18)
CRP SERPL-MCNC: 32.1 MG/L (ref ?–3)
FERRITIN SERPL-MCNC: 210.6 NG/ML (ref 8–388)
GLUCOSE SERPLBLD-MCNC: 183 MG/DL (ref 74–106)
HCT VFR BLD CALC: 35.2 % (ref 36–45)
LYMPHOCYTES # SPEC AUTO: 0.6 K/UL (ref 0.7–4.9)
PMV BLD: 9.7 FL (ref 7.6–11.3)
POTASSIUM SERPL-SCNC: 3.9 MMOL/L (ref 3.5–5.1)
RBC # BLD: 4.13 M/UL (ref 3.86–4.86)

## 2022-01-04 RX ADMIN — HUMAN INSULIN SCH UNIT: 100 INJECTION, SOLUTION SUBCUTANEOUS at 16:40

## 2022-01-04 RX ADMIN — GABAPENTIN SCH MG: 300 CAPSULE ORAL at 07:54

## 2022-01-04 RX ADMIN — HUMAN INSULIN SCH UNIT: 100 INJECTION, SOLUTION SUBCUTANEOUS at 12:00

## 2022-01-04 RX ADMIN — HYDRALAZINE HYDROCHLORIDE PRN MG: 20 INJECTION INTRAMUSCULAR; INTRAVENOUS at 17:58

## 2022-01-04 RX ADMIN — ASPIRIN SCH MG: 81 TABLET, COATED ORAL at 07:53

## 2022-01-04 RX ADMIN — Medication SCH MG: at 12:03

## 2022-01-04 RX ADMIN — Medication SCH MG: at 16:14

## 2022-01-04 RX ADMIN — CHOLECALCIFEROL TAB 25 MCG (1000 UNIT) SCH UNIT: 25 TAB at 07:53

## 2022-01-04 RX ADMIN — INSULIN GLARGINE SCH UNIT: 100 INJECTION, SOLUTION SUBCUTANEOUS at 08:11

## 2022-01-04 RX ADMIN — HUMAN INSULIN SCH UNIT: 100 INJECTION, SOLUTION SUBCUTANEOUS at 20:47

## 2022-01-04 RX ADMIN — Medication SCH ML: at 07:54

## 2022-01-04 RX ADMIN — SODIUM CHLORIDE SCH MLS: 0.9 INJECTION, SOLUTION INTRAVENOUS at 21:00

## 2022-01-04 RX ADMIN — Medication SCH MG: at 07:54

## 2022-01-04 RX ADMIN — SODIUM BICARBONATE TAB 325 MG SCH MG: 325 TAB at 16:14

## 2022-01-04 RX ADMIN — Medication SCH MG: at 07:53

## 2022-01-04 RX ADMIN — HUMAN INSULIN SCH UNIT: 100 INJECTION, SOLUTION SUBCUTANEOUS at 08:11

## 2022-01-04 RX ADMIN — ZINC SULFATE CAP 220 MG (50 MG ELEMENTAL ZN) SCH MG: 220 (50 ZN) CAP at 07:53

## 2022-01-04 RX ADMIN — Medication SCH MG: at 20:46

## 2022-01-04 RX ADMIN — GABAPENTIN SCH MG: 300 CAPSULE ORAL at 20:47

## 2022-01-04 RX ADMIN — HYDRALAZINE HYDROCHLORIDE PRN MG: 20 INJECTION INTRAMUSCULAR; INTRAVENOUS at 12:29

## 2022-01-04 RX ADMIN — SODIUM BICARBONATE TAB 325 MG SCH MG: 325 TAB at 07:53

## 2022-01-04 RX ADMIN — ATORVASTATIN CALCIUM SCH MG: 40 TABLET, FILM COATED ORAL at 20:46

## 2022-01-04 RX ADMIN — Medication SCH ML: at 20:47

## 2022-01-04 RX ADMIN — FAMOTIDINE SCH MG: 20 TABLET, FILM COATED ORAL at 07:53

## 2022-01-04 RX ADMIN — SODIUM CHLORIDE SCH MLS: 0.9 INJECTION, SOLUTION INTRAVENOUS at 09:19

## 2022-01-04 NOTE — RAD REPORT
EXAM DESCRIPTION:  [place] Single View1/4/2022 5:19 am

 

CLINICAL HISTORY:  Shortness of breath

 

COMPARISON:  December 31, 2021

 

FINDINGS:   Mild worsening in moderate bilateral pulmonary opacities.

 

Heart is borderline enlarged

 

IMPRESSION:  Mild worsening in a moderate pneumonia

## 2022-01-04 NOTE — P.PN
Date of Service: 01/04/22





Subjective:


Feels her breathing has become less labored


Continues with dizziness with ambulation/getting up, reports room spins.


Otherwise without any new complaints


On 2 L nasal cannula








ROS: 


10 point ROS as noted above, otherwise negative








Physical exam


GEN: Alert, oriented, NAD, up to bedside commode


HEENT: Normal conjunctiva, sclera anicteric


CV: Regular rate and rhythm, no edema


Pulm: mild labored respirations on 2 LNC


ABD: Soft, nontender, nondistended


Integumentary: No rashes


Neuro: Normal speech, normal affect








Problem List


Acute hypoxemic respiratory failure secondary to COVID-19 pneumonia


Vertigo


CKD stage IV


metabolic acidosis


NSTEMI


Type 2 diabetes mellitus


Acute metabolic encephalopathy


Distention of cerebral ventricle








Possibility of obstructive hydrocephalus given prominence fourth ventricle as 

read by the CT scan.


Neurology at [facility] was initially accepted patient for transfer but 

no bed availability


Neurology consulted and evaluated patient yesterday, recommended workup as 

outpatient and no need to transfer as neuro symptoms have i mproved


josue dc'd 1/3, urinating without issue


AMS resolved. suspected secondary to hyperglycemia, COVID, and possibly from ve

ntricle distention


Dizziness/vertigo possibly from orthostatic vitals, possibly from distended 

ventricle.


PT consulted


check orthostatic vital signs


Continue insulin sliding scale and titrate Lantus insulin for hyperglycemia.


Metabolic acidosis is likely secondary to CKD.


Nephrology consulted, patient seen by Dr. Ferrer and started on oral bicarb 

replacement.  Patient has CKD stage IV.


Cardiology-Dr. Oneil's input appreciated.  He recommend outpatient stress 

test. 


Elevated troponin secondary to acute viral myocarditis.  Patient is currently 

asymptomatic.


Continue aspirin and Coreg


Echocardiogram pending.


CXR: worsening COVID pneumonia


Continue steroid and vitamins.








Dispo: anticipate dc home in 24-48hrs





Time Spent Managing Pts Care (In Minutes): 35

## 2022-01-04 NOTE — CON
Reason For Consultation:  Consultation called because of possible obstructive hydrocephalus.



History Of Present Illness:  Ms. Faye is a 66-year-old patient who is admitted to [facility]
 with COVID pneumonia.  She does have comorbid diabetes mellitus, hypertension, stage 4 kidney diseas
e.  Her presentation was of altered mental status, confusion, combativeness, and lethargy.  She also 
had malaise, vomiting, nausea and fever.  She had poor appetite and was not taking medications.  She 
was found to be COVID positive and was admitted for treatment.  She received IV fluids along with zin
c, prednisone, vitamin D, aspirin, and medication for dyslipidemia and blood pressure.  She has had s
ome mild orthostatic changes while hospitalized where she would feel somewhat lightheaded going from 
a lying to sitting position.  Orthostatic blood pressures today showed lying blood pressure 151/63 wi
th pulse of 79 and sitting blood pressure 112/63, pulse 77, she was mildly symptomatic.  Standing blo
od pressure 116/55, pulse 77.  



Her laboratory studies did show slightly elevated white blood cell count of 13.1 with neutrophils of 
91.7%, hemoglobin 11.1.  Arterial blood gas did show pH 7.35, pCO2 was 31, pO2 was 37.9, and kidney f
unction showed a creatinine 2.24, glucose ranged from 183 to 280, calcium low at 7.9.  Procalcitonin 
was 0.14, which is slightly elevated.  Her cholesterol panel showed an elevated LDL of 167, HDL 37, t
otal cholesterol 249.  C-reactive protein elevated to 47.  Beta natriuretic peptide elevated at 5190 
and troponin elevated at 0.56.  Liver function studies were normal. 



She has had a head CT scan, which showed isolated prominence of the fourth ventricle, which the radio
logist notes is probably insignificant, although alternatively he said may represent trapped fourth v
entricle secondary to obstructive hydrocephalus and should be correlated.  At this point, she has not
 had any CSF flow study, which cannot be done at this hospital.  Her echocardiogram is normal with ej
ection fraction of 50%.



Past Medical History:  As noted, include congestive heart failure.



Past Surgical History:  Gastric bypass with stable stomach, tubal ligation, cholecystectomy.



Allergies:  NO KNOWN DRUG ALLERGIES.



Medications:  Amlodipine, gabapentin, metformin, tramadol, Levemir 35 units daily, magnesium gluconat
e 500 mg 3 times daily, Norvasc 5 mg daily, Lasix 40 mg daily, hydrocodone 5/325 every 6 hours as nee
ded, spironolactone 50 mg daily, carvedilol 6.25 mg daily.



Social History:  The patient denies alcohol tobacco, or IV drug use.  Does live at home with family a
nd uses caffeinated beverages.



Family History:  Positive for cancer in father; hypertension and diabetes in mother; diabetes in brot
her; diabetes in sister.



Review of Systems:

As mentioned above.  She did have shortness of breath, fatigue, confusion, disorientation, diffuse we
akness, diarrhea with nausea, vomiting, malaise.



Physical Examination:

Vital Signs:  Blood pressure 172/77, pulse 71, respiratory rate 16 to 18, temperature 97.3, oxygen sa
turation 93%, weight 240 pounds, height 5 feet, BMI 36.5. 

General:  Ms. Faye is lying in bed.  She is in no significant distress.  She has no significant short
ness of breath. 

HEENT:  She is normocephalic and atraumatic.  Sclerae are anicteric.  Oropharynx is moist and pink. 

Abdomen:  Obese, but soft. 

Extremities:  Show no significant edema or cyanosis. 

Neurological:  She is alert and oriented to situation, place, and person.  She has no focal cranial n
erve deficits and motor examination upper and lower extremities, she has no focal weakness.  She has 
equally strong upper and lower extremities.  Her sensory exam shows a stocking-glove loss, light touc
h temperature in the legs and arms.  Coordination is slow, but intact in the upper and lower extremit
ies.  At this point, she has not yet ambulated.  She did perform unsupported out of bed sitting for a
bout 20 minutes.  She did do range-of-motion exercises.  She did complete sit-to-stand with minimal a
ssistance; however, she became short of breath than quickly returned to the sitting position.  She di
d take oxygen 2 L by nasal cannula.  She did report improvement in her dizziness with sit-to-stand.



Assessment:  Ms. Faye is a 66-year-old patient with COVID pneumonia.  She has a finding on CT scan sh
owing possible fourth ventricle dilatation related to an obstructive hydrocephalus.  However, clinica
lly it does not seem to match that picture and there is no evidence of a Chiari malformation.  At thi
s point, aggressive management of COVID pneumonia and multiple comorbid conditions including hyperten
yareli, diabetes mellitus, stage 4 kidney disease.



Plan:  She may benefit from a CSF cine flow study, which is not done at this hospital.  If that is de
termined to be the case, she may need a neurosurgical intervention.  However, at this point, it does 
not appear to be an acute problem and she may be worked up on an outpatient basis.  Next, in terms of
 therapy, she may benefit from physical therapy to help recover her strength, balance, coordination, 
and gait given the limitations related to COVID infection, respiratory limitations, and some potentia
l autonomic changes given her orthostatic findings.  At this point, the patient may be discharged if 
she is able to go home with family.  She also again may be a potential candidate for rehabilitation e
ither outpatient depending on her level of functioning or more likely inpatient at this point.





MARQUEZ/SHAMA

DD:  01/04/2022 20:06:05Voice ID:  [id number]

DT:  01/04/2022 20:45:06Report ID:  [id number]

## 2022-01-04 NOTE — P.PN
Date of Service: 01/04/22


Vital Signs











Temp Pulse Resp BP Pulse Ox


 


 97.3 F   72   18   153/70 H  93 


 


 01/04/22 16:00  01/04/22 20:47  01/04/22 16:00  01/04/22 20:47  01/04/22 16:00





Medications





Acetaminophen (Acetaminophen 500 Mg Tab)  500 mg PO Q4HP PRN


   PRN Reason: TEMP > 100' F


   Last Admin: 01/03/22 22:41 Dose:  500 mg


   Documented by: 


Ascorbic Acid (Ascorbic Acid 500 Mg Tablet)  500 mg PO QID Pending sale to [facility]


   Last Admin: 01/04/22 20:46 Dose:  500 mg


   Documented by: 


Aspirin (Aspirin Ec 81 Mg Tab)  162 mg PO DAILY Pending sale to [facility]


   Last Admin: 01/04/22 07:53 Dose:  162 mg


   Documented by: 


Atorvastatin Calcium (Atorvastatin 40 Mg Tab)  40 mg PO BEDTIME Pending sale to [facility]


   Last Admin: 01/04/22 20:46 Dose:  40 mg


   Documented by: 


Benzonatate (Benzonatate 100 Mg Cap)  100 mg PO TID PRN


   PRN Reason: COUGH


Carvedilol (Carvedilol 6.25 Mg Tab)  6.25 mg PO BID Pending sale to [facility]


   Last Admin: 01/04/22 20:47 Dose:  6.25 mg


   Documented by: 


Cholecalciferol (Vitamin D 1000 Unit Tab)  4,000 unit PO DAILY Pending sale to [facility]


   Last Admin: 01/04/22 07:53 Dose:  4,000 unit


   Documented by: 


Famotidine (Famotidine 20 Mg Tab)  20 mg PO DAILY Pending sale to [facility]; Protocol


   Last Admin: 01/04/22 07:53 Dose:  20 mg


   Documented by: 


Gabapentin (Gabapentin 300 Mg Cap)  300 mg PO BID Pending sale to [facility]


   Last Admin: 01/04/22 20:47 Dose:  300 mg


   Documented by: 


Heparin Sodium (Porcine) (Heparin 1,000 Unit/Ml Vial)  0 unit IV PRN PRN


   PRN Reason: Heparin Re-Bolus Per Protocol


Hydralazine HCl (Hydralazine Hcl 20 Mg/Ml Vial)  10 mg IV Q6HP PRN


   PRN Reason: Titrate to SBP (MUST DEFINE)


   Last Admin: 01/04/22 17:58 Dose:  10 mg


   Documented by: 


Sodium Chloride (Ns 1000 Ml Ivbag)  1,000 mls @ 75 mls/hr IV .[id number] Pending sale to [facility]


   Last Admin: 01/04/22 21:00 Dose:  1,000 mls


   Documented by: 


Insulin Glargine (Insulin Glargine 100 Unit/Ml)  10 unit SQ DAILY Pending sale to [facility]


   Last Admin: 01/04/22 08:11 Dose:  10 unit


   Documented by: 


Insulin Human Regular (Insulin -Regular Human 50 Unit/0.5 Ml Ml)  0 unit SQ ACHS

Pending sale to [facility]; Protocol


   Last Admin: 01/04/22 20:47 Dose:  6 unit


   Documented by: 


Lorazepam (Lorazepam 2 Mg/Ml Vial)  0.5 mg IV 1X PRN


   PRN Reason: AGITATION


   Last Admin: 01/01/22 02:43 Dose:  0.5 mg


   Documented by: 


Morphine Sulfate (Morphine 2 Mg/Ml Syr)  2 mg IV Q6H PRN


   PRN Reason: Pain scale 8-10 (Severe)


Ondansetron HCl (Ondansetron 4 Mg/2 Ml Vial)  4 mg IV Q6HP PRN


   PRN Reason: NAUSEA / VOMITING


Prednisone (Prednisone 10 Mg Tab)  10 mg PO BID Pending sale to [facility]


   Last Admin: 01/04/22 20:46 Dose:  10 mg


   Documented by: 


Sodium Bicarbonate (Sodium Bicarb 325 Mg Tab)  650 mg PO BIDWM Pending sale to [facility]


   Last Admin: 01/04/22 16:14 Dose:  650 mg


   Documented by: 


Sodium Chloride (Flush Normal Saline 10 Ml)  10 ml IV BID Pending sale to [facility]


   Last Admin: 01/04/22 20:47 Dose:  10 ml


   Documented by: 


Thiamine HCl (Thiamine Hcl 100 Mg Tablet)  200 mg PO DAILY Pending sale to [facility]


   Last Admin: 01/04/22 07:53 Dose:  200 mg


   Documented by: 


Zinc Sulfate (Zinc Sulfate 220 Mg Cap)  220 mg PO DAILY Pending sale to [facility]


   Last Admin: 01/04/22 07:53 Dose:  220 mg


   Documented by: 


Microbiology Results





12/31/21 11:10   Blood  - Blood   Aerobic Blood Culture - Preliminary


                            No growth in 24 hours.


12/31/21 11:10   Blood  - Blood   Anaerobic Blood Culture - Preliminary


                            No growth in 24 hours.





Assessment/ Plan: 


Nephrology





No dyspnea


No chest pain


Feeling better


No acute events overnight





Vitals, medications, blood work and imaging reviewed in the chart


General: In no apparent distress, Oriented x3, Cooperative


HEENT: Atraumatic


Neck: Supple


Respiratory: Clear to auscultation bilaterally


Cardiovascular: No edema, Regular rate/rhythm


Gastrointestinal: Soft and benign, Non-distended


Musculoskeletal: No clubbing, No contractures


Integumentary: No rashes, No cyanosis


Neurological: Normal speech





Blood work reviewed in the chart.





Imagings Data: 


EXAM DESCRIPTION:  [place] Single View12/31/2021 11:53 am


CLINICAL HISTORY:  cough


COMPARISON:  Jan 2021


FINDINGS:  Lungs are mildly hazy which may indicate a mild pneumonia.


Heart is normal size





Conclusions/Impression: 





RADHA in the setting of intermittent ibuprofen


CKD IV with proteinuria


-No NSAIDs





Hyponatremia


-Continue IVF





Acidosis


-Continue oral bicarb





HTN with CKD/ CHF


-Increase Coreg 12.5mg BID


-Start Amlodipine 5mg Daily





Diastolic CHF, chronic


-Low sodium diet





DM II with CKD & Hyperglycemia


-Continue Lantus


-RISS





Moderate malnutrition


-Start Nepro


-Give IV albumin X1





Anemia in chronic illness


-Monitor H&H





CKD MBD


-Continue Vitamin D3


-Start Calcitriol Daily

## 2022-01-05 LAB
BUN BLD-MCNC: 60 MG/DL (ref 7–18)
CRP SERPL-MCNC: 18.1 MG/L (ref ?–3)
FERRITIN SERPL-MCNC: 158.1 NG/ML (ref 8–388)
GLUCOSE SERPLBLD-MCNC: 190 MG/DL (ref 74–106)
HCT VFR BLD CALC: 34.1 % (ref 36–45)
LYMPHOCYTES # SPEC AUTO: 0.6 K/UL (ref 0.7–4.9)
MORPHOLOGY BLD-IMP: (no result)
PMV BLD: 10.2 FL (ref 7.6–11.3)
POTASSIUM SERPL-SCNC: 3.9 MMOL/L (ref 3.5–5.1)
RBC # BLD: 4.04 M/UL (ref 3.86–4.86)

## 2022-01-05 RX ADMIN — ACETAMINOPHEN PRN MG: 500 TABLET, FILM COATED ORAL at 08:16

## 2022-01-05 RX ADMIN — Medication SCH MG: at 21:51

## 2022-01-05 RX ADMIN — HUMAN INSULIN SCH UNIT: 100 INJECTION, SOLUTION SUBCUTANEOUS at 11:57

## 2022-01-05 RX ADMIN — SODIUM BICARBONATE TAB 325 MG SCH MG: 325 TAB at 07:38

## 2022-01-05 RX ADMIN — FAMOTIDINE SCH MG: 20 TABLET, FILM COATED ORAL at 07:39

## 2022-01-05 RX ADMIN — HUMAN INSULIN SCH UNIT: 100 INJECTION, SOLUTION SUBCUTANEOUS at 16:43

## 2022-01-05 RX ADMIN — INSULIN GLARGINE SCH UNIT: 100 INJECTION, SOLUTION SUBCUTANEOUS at 08:41

## 2022-01-05 RX ADMIN — CALCITRIOL CAPSULES 0.25 MCG SCH MCG: 0.25 CAPSULE ORAL at 07:39

## 2022-01-05 RX ADMIN — Medication SCH MG: at 12:16

## 2022-01-05 RX ADMIN — ASPIRIN SCH MG: 81 TABLET, COATED ORAL at 07:40

## 2022-01-05 RX ADMIN — ATORVASTATIN CALCIUM SCH MG: 40 TABLET, FILM COATED ORAL at 21:51

## 2022-01-05 RX ADMIN — Medication SCH MG: at 07:40

## 2022-01-05 RX ADMIN — CHOLECALCIFEROL TAB 25 MCG (1000 UNIT) SCH UNIT: 25 TAB at 07:39

## 2022-01-05 RX ADMIN — HYDRALAZINE HYDROCHLORIDE PRN MG: 20 INJECTION INTRAMUSCULAR; INTRAVENOUS at 12:16

## 2022-01-05 RX ADMIN — GABAPENTIN SCH MG: 300 CAPSULE ORAL at 07:39

## 2022-01-05 RX ADMIN — SODIUM BICARBONATE TAB 325 MG SCH MG: 325 TAB at 16:18

## 2022-01-05 RX ADMIN — Medication SCH ML: at 07:41

## 2022-01-05 RX ADMIN — Medication SCH ML: at 16:18

## 2022-01-05 RX ADMIN — ZINC SULFATE CAP 220 MG (50 MG ELEMENTAL ZN) SCH MG: 220 (50 ZN) CAP at 07:40

## 2022-01-05 RX ADMIN — Medication SCH ML: at 21:00

## 2022-01-05 RX ADMIN — SODIUM CHLORIDE SCH: 0.9 INJECTION, SOLUTION INTRAVENOUS at 11:46

## 2022-01-05 RX ADMIN — GABAPENTIN SCH MG: 300 CAPSULE ORAL at 21:51

## 2022-01-05 RX ADMIN — HUMAN INSULIN SCH UNIT: 100 INJECTION, SOLUTION SUBCUTANEOUS at 08:41

## 2022-01-05 RX ADMIN — HYDRALAZINE HYDROCHLORIDE PRN MG: 20 INJECTION INTRAMUSCULAR; INTRAVENOUS at 00:53

## 2022-01-05 RX ADMIN — SODIUM CHLORIDE SCH MLS: 0.9 INJECTION, SOLUTION INTRAVENOUS at 10:10

## 2022-01-05 RX ADMIN — Medication SCH MG: at 16:18

## 2022-01-05 RX ADMIN — HUMAN INSULIN SCH: 100 INJECTION, SOLUTION SUBCUTANEOUS at 21:00

## 2022-01-05 NOTE — PN
Subjective:  Ms. Faye has no new complaint.  She is eating lunch.  She ambulated
less than 5 feet using a rolling walker and moderate assistance by physical 
therapy.  On questioning, she admits to falling for 5-7 years.  However, she is 
falling more frequently within the last year.  She is unable to maintain balance
using a single pronged cane.



Objective: Orthostatic blood pressures shows lying 177/79 with pulse 85, sitting
155/70, pulse 87 and standing 122/62, pulse 71.  She was mildly symptomatic.

Her cranial nerve examination revealed mild nystagmus on lateral eye movement.  
Otherwise, she has no focal cranial nerve deficits.  He has no focal motor 
weakness in the upper and lower shoulders.  She has good fine finger movements 
with slow but intact rapid alternating movements.  Her heel to shin testing is 
adequate bilaterally.  She has marked difficulty ambulating and is unable to do 
tandem gait.



Laboratory studies: White blood cell count 11.4, hemoglobin 11.5, platelets 395,
sodium 143, BUN 49, creatinine 1.67.



MRI of the brain shows marked global cerebellar atrophy with secondary 
dilatation of the fourth ventricle.  Cerebellar atrophy has progressed 
significantly compared to a CT scan done in April 2014.  She has mild brain stem
and cerebral atrophy.



Assessment: Mrs. Faye has marked autonomic dysfunction, significant gait 
instability, worsening tandem gait with high tendency to fall.  She has positive
orthostatic pressure changes and her brain MRI shows marked cerebellar atrophy 
and to a lesser extent brainstem atrophy.  Her tevin appears intact.  These 
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



Plan: Aggressive physical and occupational therapy.  She should ambulate with a 
2-wheeled walker at all times.  Use an abdominal binder and MARCELINO hose above the 
knee when ambulating.  Consider Florinef to support blood pressure if her 
systolic blood pressure falls below 100.  An interval brain MRI in one year, may
be helpful.  Discharge to skilled nursing for aggressive therapy.

KIM

## 2022-01-05 NOTE — P.PN
Date of Service: 01/05/22


Vital Signs











Temp Pulse Resp BP Pulse Ox


 


 98.5 F   77   20   166/77 H  94 


 


 01/05/22 16:00  01/05/22 16:00  01/05/22 16:00  01/05/22 16:00  01/05/22 16:00





Medications





Acetaminophen (Acetaminophen 500 Mg Tab)  500 mg PO Q4HP PRN


   PRN Reason: TEMP > 100' F


   Last Admin: 01/05/22 08:16 Dose:  500 mg


   Documented by: 


Ascorbic Acid (Ascorbic Acid 500 Mg Tablet)  500 mg PO QID [facility]


   Last Admin: 01/05/22 16:18 Dose:  500 mg


   Documented by: 


Aspirin (Aspirin Ec 81 Mg Tab)  162 mg PO DAILY [facility]


   Last Admin: 01/05/22 07:40 Dose:  162 mg


   Documented by: 


Atorvastatin Calcium (Atorvastatin 40 Mg Tab)  40 mg PO BEDTIME [facility]


   Last Admin: 01/04/22 20:46 Dose:  40 mg


   Documented by: 


Benzonatate (Benzonatate 100 Mg Cap)  100 mg PO TID PRN


   PRN Reason: COUGH


Calcitriol (Calcitrol 0.25 Mcg Cap)  0.5 mcg PO DAILY [facility]


   Last Admin: 01/05/22 07:39 Dose:  0.5 mcg


   Documented by: 


Carvedilol (Carvedilol 6.25 Mg Tab)  12.5 mg PO BID [facility]


   Last Admin: 01/05/22 08:41 Dose:  12.5 mg


   Documented by: 


Cholecalciferol (Vitamin D 1000 Unit Tab)  5,000 unit PO DAILY [facility]


   Last Admin: 01/05/22 07:39 Dose:  5,000 unit


   Documented by: 


Enteral Nutritional Formula (Nepro Shake 237 Ml Can)  237 ml PO BIDL [facility]


   Last Admin: 01/05/22 16:18 Dose:  237 ml


   Documented by: 


Famotidine (Famotidine 20 Mg Tab)  20 mg PO DAILY [facility]; Protocol


   Last Admin: 01/05/22 07:39 Dose:  20 mg


   Documented by: 


Gabapentin (Gabapentin 300 Mg Cap)  300 mg PO BID [facility]


   Last Admin: 01/05/22 07:39 Dose:  300 mg


   Documented by: 


Heparin Sodium (Porcine) (Heparin 1,000 Unit/Ml Vial)  0 unit IV PRN PRN


   PRN Reason: Heparin Re-Bolus Per Protocol


Hydralazine HCl (Hydralazine Hcl 20 Mg/Ml Vial)  10 mg IV Q6HP PRN


   PRN Reason: Titrate to SBP (MUST DEFINE)


   Last Admin: 01/05/22 12:16 Dose:  10 mg


   Documented by: 


Insulin Glargine (Insulin Glargine 100 Unit/Ml)  10 unit SQ DAILY [facility]


   Last Admin: 01/05/22 08:41 Dose:  10 unit


   Documented by: 


Insulin Human Regular (Insulin -Regular Human 50 Unit/0.5 Ml Ml)  0 unit SQ ACHS

[facility]; Protocol


   Last Admin: 01/05/22 16:43 Dose:  4 unit


   Documented by: 


Lorazepam (Lorazepam 2 Mg/Ml Vial)  0.5 mg IV 1X PRN


   PRN Reason: AGITATION


   Last Admin: 01/01/22 02:43 Dose:  0.5 mg


   Documented by: 


Morphine Sulfate (Morphine 2 Mg/Ml Syr)  2 mg IV Q6H PRN


   PRN Reason: Pain scale 8-10 (Severe)


Nifedipine (Nifedipine 10 Mg Cap)  10 mg PO BEDTIME [facility]


Ondansetron HCl (Ondansetron 4 Mg/2 Ml Vial)  4 mg IV Q6HP PRN


   PRN Reason: NAUSEA / VOMITING


Prednisone (Prednisone 10 Mg Tab)  10 mg PO BID [facility]


   Last Admin: 01/05/22 07:40 Dose:  10 mg


   Documented by: 


Sodium Bicarbonate (Sodium Bicarb 325 Mg Tab)  650 mg PO BIDWM [facility]


   Last Admin: 01/05/22 16:18 Dose:  650 mg


   Documented by: 


Sodium Chloride (Flush Normal Saline 10 Ml)  10 ml IV BID [facility]


   Last Admin: 01/05/22 07:41 Dose:  10 ml


   Documented by: 


Thiamine HCl (Thiamine Hcl 100 Mg Tablet)  200 mg PO DAILY [facility]


   Last Admin: 01/05/22 07:40 Dose:  200 mg


   Documented by: 


Zinc Sulfate (Zinc Sulfate 220 Mg Cap)  220 mg PO DAILY [facility]


   Last Admin: 01/05/22 07:40 Dose:  220 mg


   Documented by: 


Microbiology Results





12/31/21 11:10   Blood  - Blood   Aerobic Blood Culture - Final


                            No growth in 5 days.


12/31/21 11:10   Blood  - Blood   Anaerobic Blood Culture - Final


                            No growth in 5 days.





Assessment/ Plan: 


Nephrology





No dyspnea


No chest pain


Reports weakness and dizziness.


No acute events overnight





Vitals, medications, blood work and imaging reviewed in the chart


General: In no apparent distress, Oriented x3, Cooperative


HEENT: Atraumatic


Neck: Supple


Respiratory: Clear to auscultation bilaterally


Cardiovascular: No edema, Regular rate/rhythm


Gastrointestinal: Soft and benign, Non-distended


Musculoskeletal: No clubbing, No contractures


Integumentary: No rashes, No cyanosis


Neurological: Normal speech





Blood work reviewed in the chart.





Imagings Data: 


EXAM DESCRIPTION:  [place] Single View12/31/2021 11:53 am


CLINICAL HISTORY:  cough


COMPARISON:  Jan 2021


FINDINGS:  Lungs are mildly hazy which may indicate a mild pneumonia.


Heart is normal size





Conclusions/Impression: 





RADHA in the setting of intermittent ibuprofen


CKD IV with proteinuria


-No NSAIDs





Hyponatremia


-Discontinue IVF





Acidosis


-Continue oral bicarb





HTN with CKD/ CHF


-Continue Coreg 12.5mg BID


-Discontinue Amlodipine


-Start Nifedipine qhs





Diastolic CHF, chronic


-Low sodium diet





DM II with CKD & Hyperglycemia


-Continue Lantus


-RISS





Moderate malnutrition


-Continue Nepro


-Give IV albumin prn





Anemia in chronic illness


-Monitor H&H





CKD MBD


-Continue Vitamin D3


-Continue Calcitriol Daily








Case reviewed with Dr. Cordero

## 2022-01-05 NOTE — P.PN
Date of Service: 01/05/22





Subjective:


Breathing less labored


Continues with dizziness with ambulation/standing, slightly better than yes

terday


Has not gotten out of bed, fear of falling/dizziness


Ideally would want to go home, currently not very interested SNF or other 

possibilities


+orthostatic yesterday





ROS: 


10 point ROS as noted above, otherwise negative








Physical exam


GEN: Alert, oriented, NAD


HEENT: Normal conjunctiva, sclera anicteric


CV: Regular rate and rhythm, no edema


Pulm: non labored respirations on 2 LNC


ABD: Soft, nontender, nondistended


Integumentary: No rashes


Neuro: Normal speech, normal affect








Problem List


Acute hypoxemic respiratory failure secondary to COVID-19 pneumonia


Vertigo


CKD stage IV


metabolic acidosis


NSTEMI


Type 2 diabetes mellitus


Acute metabolic encephalopathy


Distention of cerebral ventricle








Possibility of obstructive hydrocephalus given prominence fourth ventricle as 

read by the CT scan.


Neurology at [facility] was initially accepted patient for transfer but 

no bed availability


Neurology consulted and evaluated patient yesterday, recommended workup as 

outpatient and no need to transfer as neuro symptoms have i mproved


josue dc'd 1/3, urinating without issue


AMS resolved. suspected secondary to hyperglycemia, COVID, and possibly from 

ventricle distention


Dizziness/vertigo worsened from orthostatic vitals, possibly from distended 

ventricle.


Positive orthostatics yesterday


Continue insulin sliding scale and titrate Lantus insulin for hyperglycemia.


Nephrology consulted, patient seen by Dr. Ferrer and started on oral bicarb 

replacement.  Patient has CKD stage IV.


RADHA resolved


Cardiology-Dr. Oneil's input appreciated.  He recommend outpatient stress 

test. 


Elevated troponin secondary to acute viral myocarditis.  Patient is currently 

asymptomatic. Continue aspirin and Coreg





Continue steroid and vitamins.


Continues with hesitancy getting out of bed due to dizziness


Has not ambulated much during her hospitalization, also reporting generalized 

lower extremity weakness


Physical therapy consulted


Patient may benefit from SNF versus possibly even rehab


We will discontinue Norvasc


Add nifedipine at nighttime to assist with blood pressure control


trial midodrine tomorrow if still remains symptomatic








Dispo: anticipate dc home in 24-48hrs





Time Spent Managing Pts Care (In Minutes): 35

## 2022-01-06 LAB
BUN BLD-MCNC: 53 MG/DL (ref 7–18)
CRP SERPL-MCNC: 11.6 MG/L (ref ?–3)
GLUCOSE SERPLBLD-MCNC: 111 MG/DL (ref 74–106)
POTASSIUM SERPL-SCNC: 3.8 MMOL/L (ref 3.5–5.1)

## 2022-01-06 RX ADMIN — Medication SCH MG: at 09:19

## 2022-01-06 RX ADMIN — Medication SCH MG: at 17:01

## 2022-01-06 RX ADMIN — HUMAN INSULIN SCH UNIT: 100 INJECTION, SOLUTION SUBCUTANEOUS at 21:00

## 2022-01-06 RX ADMIN — Medication SCH ML: at 21:00

## 2022-01-06 RX ADMIN — Medication SCH MG: at 09:18

## 2022-01-06 RX ADMIN — Medication SCH: at 09:00

## 2022-01-06 RX ADMIN — ATORVASTATIN CALCIUM SCH MG: 40 TABLET, FILM COATED ORAL at 21:00

## 2022-01-06 RX ADMIN — CALCITRIOL CAPSULES 0.25 MCG SCH MCG: 0.25 CAPSULE ORAL at 09:18

## 2022-01-06 RX ADMIN — GABAPENTIN SCH MG: 300 CAPSULE ORAL at 09:18

## 2022-01-06 RX ADMIN — ASPIRIN SCH MG: 81 TABLET, COATED ORAL at 09:17

## 2022-01-06 RX ADMIN — HUMAN INSULIN SCH UNIT: 100 INJECTION, SOLUTION SUBCUTANEOUS at 17:33

## 2022-01-06 RX ADMIN — HYDRALAZINE HYDROCHLORIDE PRN MG: 20 INJECTION INTRAMUSCULAR; INTRAVENOUS at 17:32

## 2022-01-06 RX ADMIN — Medication SCH ML: at 09:00

## 2022-01-06 RX ADMIN — SODIUM BICARBONATE TAB 325 MG SCH MG: 325 TAB at 09:17

## 2022-01-06 RX ADMIN — GABAPENTIN SCH MG: 300 CAPSULE ORAL at 21:00

## 2022-01-06 RX ADMIN — SODIUM BICARBONATE TAB 325 MG SCH MG: 325 TAB at 17:00

## 2022-01-06 RX ADMIN — HUMAN INSULIN SCH: 100 INJECTION, SOLUTION SUBCUTANEOUS at 07:30

## 2022-01-06 RX ADMIN — Medication SCH MG: at 12:23

## 2022-01-06 RX ADMIN — FAMOTIDINE SCH MG: 20 TABLET, FILM COATED ORAL at 09:18

## 2022-01-06 RX ADMIN — Medication SCH MG: at 21:00

## 2022-01-06 RX ADMIN — ZINC SULFATE CAP 220 MG (50 MG ELEMENTAL ZN) SCH MG: 220 (50 ZN) CAP at 09:18

## 2022-01-06 RX ADMIN — INSULIN GLARGINE SCH UNIT: 100 INJECTION, SOLUTION SUBCUTANEOUS at 09:00

## 2022-01-06 RX ADMIN — Medication SCH ML: at 17:01

## 2022-01-06 RX ADMIN — HUMAN INSULIN SCH UNIT: 100 INJECTION, SOLUTION SUBCUTANEOUS at 12:23

## 2022-01-06 RX ADMIN — CHOLECALCIFEROL TAB 25 MCG (1000 UNIT) SCH UNIT: 25 TAB at 09:18

## 2022-01-06 NOTE — P.PN
Date of Service: 01/06/22





Subjective:


States she has gotten out of bed with a walker, however nursing staff and 

physical therapy states she has not


Return working physical therapy she has been reluctant to get up out of bed due 

to dizziness, for falling and generalized weakness in her legs


Discussed that she is likely to unsteady/unsafe to be going home


Reportedly had 3-second pause on telemetry yesterday morning





ROS: 


10 point ROS as noted above, otherwise negative








Physical exam


GEN: Alert, oriented, sitting up at bedside


HEENT: Normal conjunctiva, sclera anicteric


CV: Regular rate and rhythm, no edema


Pulm: non labored respirations on 2 LNC


ABD: Soft, nontender, nondistended


Integumentary: No rashes


Neuro: Normal speech, normal affect, moves all extremities








Problem List


Acute hypoxemic respiratory failure secondary to COVID-19 pneumonia


Vertigo


CKD stage IV


metabolic acidosis


NSTEMI


Type 2 diabetes mellitus


Acute metabolic encephalopathy


Distention of cerebral ventricle








Possibility of obstructive hydrocephalus given prominence fourth ventricle as 

read by the CT scan.


Neurology at [facility] was initially accepted patient for transfer but 

no bed availability


Neurology consulted and evaluated patient yesterday, recommended workup as 

outpatient and no need to transfer as neuro symptoms have improved


josue dc'd 1/3, urinating without issue


AMS resolved. suspected secondary to hyperglycemia, COVID, and possibly from 

ventricle distention


Dizziness/vertigo, positive orthostatic vitals, possibly from distended 

ventricle.


has been unable to ambulate much during hospitalization secondary to dizziness


will trial midodrine during day if no improvement


Continues with hesitancy getting out of bed due to dizziness


Has not ambulated much during her hospitalization, also reporting generalized 

lower extremity weakness


Physical therapy consulted


Patient may benefit from SNF versus possibly even rehab


dc'd norvasc, added nifedipine qHS last night to assist with blood pressure 

control, improved


trial midodrine today if still remains symptomatic





Continue insulin sliding scale and titrate Lantus insulin for hyperglycemia.


Nephrology consulted, patient seen by Dr. Ferrer and started on oral bicarb rep

lacement.  Patient has CKD stage IV.


RADHA resolved


Cardiology-Dr. Oneil's input appreciated.   recommended outpatient stress 

test. 


dc coreg given bradycardia / 2-3 sec pause yesterday morning, monitor on 

telemetry, awaiting further input from cardiology


Elevated troponin secondary to acute viral myocarditis.  Patient is currently 

asymptomatic. Continue aspirin


Continue steroid and vitamins.





Dispo: likely SNF vs rehab. patient reluctant to SNF, agreeable to rehab


will discuss further with SW and PT








Time Spent Managing Pts Care (In Minutes): 35

## 2022-01-06 NOTE — P.PN
Date of Service: 01/06/22


Vital Signs











Temp Pulse Resp BP Pulse Ox


 


 97.1 F   87   16   155/70 H  93 


 


 01/06/22 16:00  01/06/22 18:43  01/06/22 16:00  01/06/22 18:43  01/06/22 16:00





Medications





Acetaminophen (Acetaminophen 500 Mg Tab)  500 mg PO Q4HP PRN


   PRN Reason: TEMP > 100' F


   Last Admin: 01/05/22 08:16 Dose:  500 mg


   Documented by: 


Ascorbic Acid (Ascorbic Acid 500 Mg Tablet)  500 mg PO QID [facility]


   Last Admin: 01/06/22 17:01 Dose:  500 mg


   Documented by: 


Aspirin (Aspirin Ec 81 Mg Tab)  162 mg PO DAILY [facility]


   Last Admin: 01/06/22 09:17 Dose:  162 mg


   Documented by: 


Atorvastatin Calcium (Atorvastatin 40 Mg Tab)  40 mg PO BEDTIME [facility]


   Last Admin: 01/05/22 21:51 Dose:  40 mg


   Documented by: 


Benzonatate (Benzonatate 100 Mg Cap)  100 mg PO TID PRN


   PRN Reason: COUGH


Calcitriol (Calcitrol 0.25 Mcg Cap)  0.5 mcg PO DAILY [facility]


   Last Admin: 01/06/22 09:18 Dose:  0.5 mcg


   Documented by: 


Cholecalciferol (Vitamin D 1000 Unit Tab)  5,000 unit PO DAILY [facility]


   Last Admin: 01/06/22 09:18 Dose:  5,000 unit


   Documented by: 


Enteral Nutritional Formula (Nepro Shake 237 Ml Can)  237 ml PO BIDL [facility]


   Last Admin: 01/06/22 17:01 Dose:  237 ml


   Documented by: 


Famotidine (Famotidine 20 Mg Tab)  20 mg PO DAILY [facility]; Protocol


   Last Admin: 01/06/22 09:18 Dose:  20 mg


   Documented by: 


Gabapentin (Gabapentin 300 Mg Cap)  300 mg PO BID [facility]


   Last Admin: 01/06/22 09:18 Dose:  300 mg


   Documented by: 


Heparin Sodium (Porcine) (Heparin 1,000 Unit/Ml Vial)  0 unit IV PRN PRN


   PRN Reason: Heparin Re-Bolus Per Protocol


Hydralazine HCl (Hydralazine Hcl 20 Mg/Ml Vial)  10 mg IV Q6HP PRN


   PRN Reason: Titrate to SBP (MUST DEFINE)


   Last Admin: 01/06/22 17:32 Dose:  10 mg


   Documented by: 


Insulin Glargine (Insulin Glargine 100 Unit/Ml)  10 unit SQ DAILY [facility]


   Last Admin: 01/06/22 09:00 Dose:  10 unit


   Documented by: 


Insulin Human Regular (Insulin -Regular Human 50 Unit/0.5 Ml Ml)  0 unit SQ ACHS

[facility]; Protocol


   Last Admin: 01/06/22 17:33 Dose:  6 unit


   Documented by: 


Lorazepam (Lorazepam 2 Mg/Ml Vial)  0.5 mg IV 1X PRN


   PRN Reason: AGITATION


   Last Admin: 01/01/22 02:43 Dose:  0.5 mg


   Documented by: 


Morphine Sulfate (Morphine 2 Mg/Ml Syr)  2 mg IV Q6H PRN


   PRN Reason: Pain scale 8-10 (Severe)


Nifedipine (Nifedipine 10 Mg Cap)  10 mg PO BEDTIME [facility]


   Last Admin: 01/05/22 21:50 Dose:  10 mg


   Documented by: 


Ondansetron HCl (Ondansetron 4 Mg/2 Ml Vial)  4 mg IV Q6HP PRN


   PRN Reason: NAUSEA / VOMITING


Prednisone (Prednisone 10 Mg Tab)  10 mg PO BID [facility]


   Last Admin: 01/06/22 09:17 Dose:  10 mg


   Documented by: 


Sodium Bicarbonate (Sodium Bicarb 325 Mg Tab)  650 mg PO BIDWM [facility]


   Last Admin: 01/06/22 17:00 Dose:  650 mg


   Documented by: 


Sodium Chloride (Flush Normal Saline 10 Ml)  10 ml IV BID [facility]


   Last Admin: 01/06/22 09:00 Dose:  Not Given


   Documented by: 


Thiamine HCl (Thiamine Hcl 100 Mg Tablet)  200 mg PO DAILY [facility]


   Last Admin: 01/06/22 09:18 Dose:  200 mg


   Documented by: 


Zinc Sulfate (Zinc Sulfate 220 Mg Cap)  220 mg PO DAILY [facility]


   Last Admin: 01/06/22 09:18 Dose:  220 mg


   Documented by: 


Microbiology Results





12/31/21 11:10   Blood  - Blood   Aerobic Blood Culture - Final


                            No growth in 5 days.


12/31/21 11:10   Blood  - Blood   Anaerobic Blood Culture - Final


                            No growth in 5 days.





Assessment/ Plan: 


Nephrology





No dyspnea


No chest pain


Reports weakness and dizziness.


No acute events overnight





Vitals, medications, blood work and imaging reviewed in the chart


General: In no apparent distress, Oriented x3, Cooperative


HEENT: Atraumatic


Neck: Supple


Respiratory: Clear to auscultation bilaterally


Cardiovascular: No edema, Regular rate/rhythm


Gastrointestinal: Soft and benign, Non-distended


Musculoskeletal: No clubbing, No contractures


Integumentary: No rashes, No cyanosis


Neurological: Normal speech





Blood work reviewed in the chart.





Imagings Data: 


EXAM DESCRIPTION:  [place] Single View12/31/2021 11:53 am


CLINICAL HISTORY:  cough


COMPARISON:  Jan 2021


FINDINGS:  Lungs are mildly hazy which may indicate a mild pneumonia.


Heart is normal size





Conclusions/Impression: 





RADHA in the setting of intermittent ibuprofen


CKD IV with proteinuria


-No NSAIDs





Hyponatremia, resolved





Acidosis


-Continue oral bicarb





HTN with CKD/ CHF


-Continue Nifedipine qhs





Diastolic CHF, chronic


-Low sodium diet





DM II with CKD & Hyperglycemia


-Continue Lantus


-RISS





Moderate malnutrition


-Continue Nepro


-Give IV albumin prn





Anemia in chronic illness


-Monitor H&H





CKD MBD


-Continue Vitamin D3


-Continue Calcitriol Daily








Case reviewed with Dr. Cordero


Plan for rehab placement

## 2022-01-07 VITALS — TEMPERATURE: 97.2 F

## 2022-01-07 VITALS — OXYGEN SATURATION: 93 %

## 2022-01-07 VITALS — DIASTOLIC BLOOD PRESSURE: 89 MMHG | SYSTOLIC BLOOD PRESSURE: 145 MMHG

## 2022-01-07 LAB
ALBUMIN SERPL BCP-MCNC: 2.3 G/DL (ref 3.4–5)
ALP SERPL-CCNC: 55 U/L (ref 45–117)
ALT SERPL W P-5'-P-CCNC: 16 U/L (ref 12–78)
AST SERPL W P-5'-P-CCNC: 18 U/L (ref 15–37)
BUN BLD-MCNC: 49 MG/DL (ref 7–18)
CRP SERPL-MCNC: 21.3 MG/L (ref ?–3)
GLUCOSE SERPLBLD-MCNC: 206 MG/DL (ref 74–106)
HCT VFR BLD CALC: 35.8 % (ref 36–45)
LYMPHOCYTES # SPEC AUTO: 0.8 K/UL (ref 0.7–4.9)
MAGNESIUM SERPL-MCNC: 2.4 MG/DL (ref 1.8–2.4)
PMV BLD: 9.2 FL (ref 7.6–11.3)
POTASSIUM SERPL-SCNC: 4 MMOL/L (ref 3.5–5.1)
RBC # BLD: 4.27 M/UL (ref 3.86–4.86)

## 2022-01-07 RX ADMIN — ASPIRIN SCH MG: 81 TABLET, COATED ORAL at 11:24

## 2022-01-07 RX ADMIN — Medication SCH MG: at 12:29

## 2022-01-07 RX ADMIN — GABAPENTIN SCH MG: 300 CAPSULE ORAL at 11:30

## 2022-01-07 RX ADMIN — HUMAN INSULIN SCH UNIT: 100 INJECTION, SOLUTION SUBCUTANEOUS at 17:03

## 2022-01-07 RX ADMIN — SODIUM BICARBONATE TAB 325 MG SCH MG: 325 TAB at 11:30

## 2022-01-07 RX ADMIN — HUMAN INSULIN SCH: 100 INJECTION, SOLUTION SUBCUTANEOUS at 07:30

## 2022-01-07 RX ADMIN — CHOLECALCIFEROL TAB 25 MCG (1000 UNIT) SCH UNIT: 25 TAB at 11:24

## 2022-01-07 RX ADMIN — CALCITRIOL CAPSULES 0.25 MCG SCH MCG: 0.25 CAPSULE ORAL at 11:24

## 2022-01-07 RX ADMIN — HYDRALAZINE HYDROCHLORIDE PRN MG: 20 INJECTION INTRAMUSCULAR; INTRAVENOUS at 12:28

## 2022-01-07 RX ADMIN — Medication SCH MG: at 11:24

## 2022-01-07 RX ADMIN — Medication SCH: at 09:00

## 2022-01-07 RX ADMIN — ZINC SULFATE CAP 220 MG (50 MG ELEMENTAL ZN) SCH MG: 220 (50 ZN) CAP at 09:00

## 2022-01-07 RX ADMIN — HUMAN INSULIN SCH UNIT: 100 INJECTION, SOLUTION SUBCUTANEOUS at 12:28

## 2022-01-07 RX ADMIN — SODIUM BICARBONATE TAB 325 MG SCH MG: 325 TAB at 17:04

## 2022-01-07 RX ADMIN — FAMOTIDINE SCH MG: 20 TABLET, FILM COATED ORAL at 11:24

## 2022-01-07 RX ADMIN — Medication SCH: at 17:00

## 2022-01-07 RX ADMIN — INSULIN GLARGINE SCH UNIT: 100 INJECTION, SOLUTION SUBCUTANEOUS at 11:30

## 2022-01-07 RX ADMIN — Medication SCH ML: at 11:31

## 2022-01-07 NOTE — P.PN
Date of Service: 01/07/22


Vital Signs











Temp Pulse Resp BP Pulse Ox


 


 97.2 F   97 H  23 H  145/89 H  94 


 


 01/07/22 12:00  01/07/22 15:11  01/07/22 12:00  01/07/22 15:11  01/07/22 12:00





Microbiology Results





12/31/21 11:10   Blood  - Blood   Aerobic Blood Culture - Final


                            No growth in 5 days.


12/31/21 11:10   Blood  - Blood   Anaerobic Blood Culture - Final


                            No growth in 5 days.





Assessment/ Plan: 


Nephrology





No dyspnea


No chest pain


Persistent weakness and dizziness but she declines transfer to a rehab facility.


No acute events overnight





Vitals, medications, blood work and imaging reviewed in the chart


General: In no apparent distress, Oriented x3, Cooperative


HEENT: Atraumatic


Neck: Supple


Respiratory: Clear to auscultation bilaterally


Cardiovascular: No edema, Regular rate/rhythm


Gastrointestinal: Soft and benign, Non-distended


Musculoskeletal: No clubbing, No contractures


Integumentary: No rashes, No cyanosis


Neurological: Normal speech





Blood work reviewed in the chart.





Imagings Data: 


EXAM DESCRIPTION:  [place] Single View12/31/2021 11:53 am


CLINICAL HISTORY:  cough


COMPARISON:  Jan 2021


FINDINGS:  Lungs are mildly hazy which may indicate a mild pneumonia.


Heart is normal size





ADDENDUM   An additional CT head study from April 2014 has become available for 

comparison. The enlargement of the fourth ventricle and the atrophy of the 

cerebellum are both progressive from 2014. Patient had little if any 

identifiable atrophy of the cerebellum at the time of the 2014 study and the 

fourth ventricle on that remote study is not outside of the range of normal in 

size.





Conclusions/Impression: 





RADHA in the setting of intermittent ibuprofen


CKD IV with proteinuria


-No NSAIDs





Hyponatremia, resolved





Acidosis


-Continue oral bicarb





HTN with CKD/ CHF


-Continue Nifedipine qhs





Diastolic CHF, chronic


-Low sodium diet





DM II with CKD & Hyperglycemia


-Continue Lantus


-RISS





Moderate malnutrition


-Continue Nepro


-Give IV albumin prn





Anemia in chronic illness


-Monitor H&H





CKD MBD


-Continue Vitamin D3


-Continue Calcitriol Daily








Case reviewed with Dr. Cordero

## 2022-01-07 NOTE — RAD REPORT
EXAM DESCRIPTION:  MRI - Brain W/Wo Cont - 1/7/2022 11:10 am

 

CLINICAL HISTORY:  eval dilated ventricle, r/o mass, abnormal CT study

 

COMPARISON:  Head Brain Wo Cont dated 12/31/2021

 

TECHNIQUE:  Sagittal and axial T1-weighted images were obtained. Axial PD/heavily T2-weighted and T2-
FLAIR images were obtained along with axial DWI/ADC mapping sequences.  Coronal heavily T2 weighted s
equence obtained.  Axial and coronal post-contrast T1-weighted images were also obtained. A 20 ml Mul
tihance contrast following utilized. Patient has an elevated creatinine. Contrast usage was cleared b
y the primary service and consulting nephrologist.

 

FINDINGS:  No acute or subacute infarction changes are present. Patient has mild chronic ischemic lula
nge in the cerebral white matter with mild cerebral volume loss. Lateral ventricles are in proportion
 to the minimal volume loss. Third ventricle is normal in size. Fourth ventricle is enlarged compared
 to the lateral and third ventricles. Cerebral aqueduct is patent. No evidence for mass lesion causin
g fourth ventricle obstruction. Foramina of [place]ch[place] patent. The patient does have significant cerebel
lum atrophy likely is the source for the fourth ventricular enlargement. No brainstem atrophy changes
 seen. Chronic ischemic changes in the brainstem.

 

 There is no edema or shift of midline structures. No extra-axial fluid collections. Gray-matter/whit
e matter junction is preserved.  Signal voids are seen as a normal finding in the major intracranial 
vessels.

 

No abnormal dural thickening or enhancement seen. No abnormal enhancement of the brain parenchyma.

 

Mastoid air cells are clear. No acute paranasal sinus finding. No globe or orbital content acute find
ing.

 

 

IMPRESSION:  Fourth ventricle is enlarged without matching lateral or third ventricular equivalent en
largement. Cerebral aqueduct is patent.

 

No obstructing mass within or adjacent to the fourth ventricle. The patient has prominent atrophy of 
the cerebellum which is believed to be the source for the fourth ventricular enlargement.

 

Cerebral atrophy is minimal on the patient has minimal cerebral white matter chronic ischemic change.
 No brainstem atrophy or measurable brainstem chronic ischemic change.

## 2022-01-07 NOTE — P.DS
Admission Date: 12/31/21


Discharge Date: 01/07/22


Disposition: TRANSFER TO INPATIENT REHAB


Discharge Condition: FAIR


Reason for Admission: covid, hyperglycemia, dehydration


Consultations: 


Nephrology - Dr. Ferrer


Neurology - Dr. Sen


Cardiology - Clarice Goodrich





Procedures: 


CXR (12/31):


FINDINGS:  Lungs are mildly hazy which may indicate a mild pneumonia.


 Heart is normal size





CT head (12/31):


FINDINGS:  An intracranial  bleed is not seen .


 The fourth ventricle is prominent. The third and lateral ventricles are normal 

caliber.


 No extra-axial fluid collection is noted.


 Fluid within the sinuses/ mastoids is not seen.


IMPRESSION:  Isolated prominence of the fourth ventricle. This probably is an 

insignificant finding. Alternatively this could indicate a trapped fourth 

ventricle secondary to obstructing hydrocephalus and should be correlated 

clinically





Echocardiogram (1/3):


Normal LVEF (55 to 60%.  Normal wall motion.  Mild tricuspid regurgitation.





CXR (1/4):


IMPRESSION:  Mild worsening in a moderate pneumonia





MRI brain (1/7):


FINDINGS:  No acute or subacute infarction changes are present. Patient has mild

chronic ischemic change in the cerebral white matter with mild cerebral volume 

loss. Lateral ventricles are in proportion to the minimal volume loss. Third 

ventricle is normal in size. Fourth ventricle is enlarged compared to the 

lateral and third ventricles. Cerebral aqueduct is patent. No evidence for mass 

lesion causing fourth ventricle obstruction. Foramina of [place] patent. The 

patient does have significant cerebellum atrophy likely is the source for the 

fourth ventricular enlargement. No brainstem atrophy changes seen. Chronic 

ischemic changes in the brainstem.


 


 There is no edema or shift of midline structures. No extra-axial fluid 

collections. Gray-matter/white matter junction is preserved.  Signal voids are 

seen as a normal finding in the major intracranial vessels.


 No abnormal dural thickening or enhancement seen. No abnormal enhancement of 

the brain parenchyma.


 Mastoid air cells are clear. No acute paranasal sinus finding. No globe or 

orbital content acute finding.


 


 


IMPRESSION:  Fourth ventricle is enlarged without matching lateral or third 

ventricular equivalent enlargement. Cerebral aqueduct is patent.


 No obstructing mass within or adjacent to the fourth ventricle. The patient has

prominent atrophy of the cerebellum which is believed to be the source for the 

fourth ventricular enlargement.


 Cerebral atrophy is minimal on the patient has minimal cerebral white matter 

chronic ischemic change. No brainstem atrophy or measurable brainstem chronic 

ischemic change.





ADDENDUM   An additional CT head study from April 2014 has become available for 

comparison. The enlargement of the fourth ventricle and the atrophy of the 

cerebellum are both progressive from 2014. Patient had little if any identifi

able atrophy of the cerebellum at the time of the 2014 study and the fourth 

ventricle on that remote study is not outside of the range of normal in size.





Addendum Dictated By:  Fransisco Hope MD   01/07/22 9623  


Addendum Signed By:  Fransisco Hope MD   01/07/22 0410


            








Problem List


Acute hypoxemic respiratory failure secondary to COVID-19 pneumonia


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


Cerebellar atrophy


CKD stage IV


metabolic acidosis


Hypertension


NSTEMI


Type 2 diabetes mellitus


Acute metabolic encephalopathy, resolved








Brief History of Present Illness: 


65 yo F with DM, HTN, CKD4 who presents with one day of AMS and increased 

confusion, combativeness and lethargy. Per family members, she has had malaise, 

nausea, vomiting, diarrhea, cough and fever since Sunday. She has had a poor 

appetite and has not been taking her medications. She tested positive for COVID 

today. Yesterday she fell, and she fell again this morning. She did not hit her 

head either time. She is oriented to person, place at bedside. Her family 

reports no confusion at baseline. Received fluids, steroids, insulin, 

hydralazine in the ED. Received 1mg of ativan for agitation.  Na 132 HCO3 15 BUN

40 Cr 2.52 GFR 19 Glu 388 troponin 0.07 procal 0.14. Some concern for findings 

on CT Head, but after discussion with neurology and neurosurgery, decided 

findings are insignificant at this time. 





Hospital Course: 


Patient's glucose improved with insulin.  She is found to have COVID-19 

pneumonia initiated with steroids and oxygen supplementation.  Pulmonology was 

consulted, patient had continued improvement and was breathing comfortably on 2 

L nasal cannula.  She reported that she has oxygen at home that she uses 

intermittently.





On admission, CT head showed a prominent 4th ventricle, concern for possibility 

of obstructive hydrocephalus.  Neurology at [facility] initially excepted

patient for transfer but there is no bed available.  Neurology in her hospital 

is consulted and evaluated the patient.  Patient had improvement of her 

neurologic symptoms and there is no immediate need for transfer.


Patient's altered mental status had resolved rather quickly, suspect secondary 

to hypoglycemia and COVID.





Patient's RADHA improved with IV hydration.  Nephrology was consulted and started 

p.o. bicarb as well for the patient's metabolic acidosis which corrected.  

Patient's Lasix was held throughout the hospitalization, and discontinued on 

discharge.  She may need to restart this in the near future.  She was on 40 mg 

twice a day.  Her spironolactone was restarted on day of discharge.





Hospitalization was complicated/prolonged due to ongoing dizziness/ataxia/autono

bobby dysfunction.


Patient was noted to have significantly elevated supine systolic blood pressures

up to the 190s, sitting down to 170, and standing [id number]


She was symptomatic with change of position, was unsteady on her feet, reported 

lower extremity weakness from not ambulating for nearly 2 weeks.


Her blood pressure medications were changed to the hospitalization to find an 

ideal treatment strategy.


Patient was having slow improvement, so MRI was obtained which revealed 

significant atrophy of her cerebellum.  Very significant compared to CT in 2014.

 


This was discussed with neurology, who suspects possible OPCA, although 

patient's tevin is not significantly atrophied.


This would explain her progressive dizziness and falling over the last 5 years, 

and explains her autonomic dysfunction (which is also likely complicated by her 

diabetes), and ataxia.


This will likely be her new norm, and this was explained to the patient.  This 

can be progressive as well.


Patient is very motivated and worked with physical therapy.  Recommended 

inpatient rehab to learn/relearn how to perform ADLs and work on strength with 

her new limitations


Recommend thigh high compression stockings abdominal binder to assist with 

autonomic dysfunction





Patient was noted to have brief episode of bradycardia/2-second pause.  Her 

carvedilol was discontinued.  She did not have any recurrence of this on 

telemetry.





On admission, patient had elevated troponin.  Cardiology was consulted, felt 

that this was demand ischemia from her Covid pneumonia.  Recommended outpatient 

follow-up for stress test once she recovers from this.  Echocardiogram was 

obtained and normal as noted above.








Vital Signs/Physical Exam: 














Temp Pulse Resp BP Pulse Ox


 


 97.2 F   87   23 H  202/88 H  94 


 


 01/07/22 12:00  01/07/22 12:00  01/07/22 12:00  01/07/22 12:00  01/07/22 12:00








General: Alert, In no apparent distress, Oriented x3


HEENT: PERRLA, Sclerae nonicteric


Neck: No LAD


Respiratory: Diminished (At bases bilaterally, nonlabored on 2 L nasal cannula)


Cardiovascular: No edema, Regular rate/rhythm


Gastrointestinal: Soft and benign, Non-distended, No tenderness


Musculoskeletal: No tenderness


Integumentary: No significant lesion


Neurological: Normal speech, Normal strength at 5/5 x4 extr, Cranial nerves 3-12

intact (Mild nystagmus on lateral movement)


Laboratory Data at Discharge: 














WBC  11.40 K/uL (4.3-10.9)  H  01/07/22  05:11    


 


Hgb  11.5 g/dL (12.0-15.0)  L  01/07/22  05:11    


 


Hct  35.8 % (36.0-45.0)  L  01/07/22  05:11    


 


Plt Count  395 K/uL (152-406)  D 01/07/22  05:11    


 


PT  11.5 SECONDS (9.5-12.5)   12/31/21  11:10    


 


INR  1.00   12/31/21  11:10    


 


APTT  Cancelled   01/03/22  12:10    


 


Sodium  143 mmol/L (136-145)   01/07/22  05:11    


 


Potassium  4.0 mmol/L (3.5-5.1)   01/07/22  05:11    


 


BUN  49 mg/dL (7-18)  H  01/07/22  05:11    


 


Creatinine  1.67 mg/dL (0.55-1.3)  H  01/07/22  05:11    


 


Glucose  206 mg/dL ()  H  01/07/22  05:11    


 


Phosphorus  3.3 mg/dL (2.5-4.9)   01/01/22  02:40    


 


Magnesium  2.4 mg/dL (1.8-2.4)   01/07/22  05:11    


 


Total Bilirubin  0.7 mg/dL (0.2-1.0)   01/07/22  05:11    


 


AST  18 U/L (15-37)   01/07/22  05:11    


 


ALT  16 U/L (12-78)   01/07/22  05:11    


 


Alkaline Phosphatase  55 U/L ()   01/07/22  05:11    


 


Troponin I  0.80 ng/mL (0.0-0.045)  H*  01/01/22  09:56    


 


Triglycerides  227 mg/dL (<150)  H  01/01/22  02:40    


 


Cholesterol  249 mg/dL (<200)  H  01/01/22  02:40    


 


HDL Cholesterol  37 mg/dL (40-60)  L  01/01/22  02:40    


 


Cholesterol/HDL Ratio  6.73   01/01/22  02:40    


 


Lipase  141 U/L ()   12/31/21  12:09    








Home Medications: 








Amlodipine [Norvasc*] 5 mg PO DAILY 01/02/22 


Atorvastatin Calcium [Lipitor*] 20 mg PO BEDTIME 01/02/22 


Furosemide [Lasix] 40 mg PO BIDL 01/02/22 


Gabapentin 300 mg PO BID 01/02/22 


Insulin Aspart [Novolog Flexpen] 20 unit SQ TIDWM 01/02/22 


Insulin Degludec [Tresiba] 60 unit SQ DAILY 01/02/22 


Spironolactone 50 mg PO DAILY 01/02/22 


Atorvastatin Calcium [Lipitor] 40 mg PO BEDTIME  tab 01/07/22 


Calcitrol [Rocaltrol*] 0.5 mcg PO DAILY  cap 01/07/22 


Cholecalciferol (Vitamin D3) [Vitamin D 1000 Iu Tab*] 5,000 unit PO DAILY  tab 

01/07/22 


Na Bicarb Tab [Sodium Bicarb 325 MG Tab*] 650 mg PO BIDWM  tab 01/07/22 


predniSONE [Deltasone*] 10 mg PO BID  tab 01/07/22 





Physician Discharge Instructions: 


You are found to have COVID-19 pneumonia.  You had improvement with treatment 

with steroids, oxygen supplementation, and vitamin supplementation.  You are 

weaned down to 2 L nasal cannula and your breathing comfortably.


You had a progressive worsening of your dizziness and unsteadiness on your feet.

  CT scan showed enlarged fourth ventricle.  An MRI was obtained which revealed 

enlargement of the fourth ventricle is due to atrophy of the cerebellum.  

Possibly OPCA (olivopontocerebellar atrophy) This explains the lightheadedness 

and falling that have been ongoing and progressive over the last 5 years.


Neurology, Dr. Sen was consulted.  Recommended physical therapy and fall 

precautions.  This would explain the orthostatic hypotension /autonomic 

dysfunction (or your blood pressure drops when you sit up and stand up).





You are also noted to have decrease in your renal function which improved with 

IV hydration.  Your Lasix was held and discontinued on discharge.  Your blood 

pressure was noted to become elevated when you are laying down.  Initially so 

your medications were held due to your orthostatic hypotension.  Your Norvasc 

and spironolactone were restarted.  During your hospitalization you had 1 brief 

episode of bradycardia down to the 20s while sleeping, and only lasting a few 

seconds.  Cardiology recommended discontinuation of your carvedilol.  They also 

recommended that you follow-up in their office in the near future for a stress 

test, once you have recovered from Covid.





Diet: ADA


Activity: Fall precautions


Followup: 


Aamir Oneil MD [ACTIVE - CAN ADMIT] - 


OOT,OOT [Primary Care Provider] - 


Time spent managing pt's care (in minutes): 45

## 2022-01-07 NOTE — P.PN
Date of Service: 01/07/22





Subjective:








ROS: 


10 point ROS as noted above, otherwise negative








Physical exam


GEN: Alert, oriented, sitting up at bedside


HEENT: Normal conjunctiva, sclera anicteric


CV: Regular rate and rhythm, no edema


Pulm: non labored respirations on 2 LNC


ABD: Soft, nontender, nondistended


Integumentary: No rashes


Neuro: Normal speech, normal affect, moves all extremities








Problem List


Acute hypoxemic respiratory failure secondary to COVID-19 pneumonia


Vertigo


CKD stage IV


metabolic acidosis


NSTEMI


Type 2 diabetes mellitus


Acute metabolic encephalopathy


Distention of cerebral ventricle








Possibility of obstructive hydrocephalus given prominence fourth ventricle as 

read by the CT scan.


Neurology at [facility] was initially accepted patient for transfer but 

no bed availability


Neurology consulted and evaluated patient yesterday, recommended workup as 

outpatient and no need to transfer as neuro symptoms have improved


josue dc'd 1/3, urinating without issue


AMS resolved. suspected secondary to hyperglycemia, COVID, and possibly from 

ventricle distention


Dizziness/vertigo, positive orthostatic vitals, possibly from distended 

ventricle.


has been unable to ambulate much during hospitalization secondary to dizziness


will trial midodrine during day if no improvement


Continues with hesitancy getting out of bed due to dizziness


Has not ambulated much during her hospitalization, also reporting generalized 

lower extremity weakness


Physical therapy consulted


Patient may benefit from SNF versus possibly even rehab


dc'd norvasc, added nifedipine qHS last night to assist with blood pressure 

control, improved


trial midodrine today if still remains symptomatic





Continue insulin sliding scale and titrate Lantus insulin for hyperglycemia.


Nephrology consulted, patient seen by Dr. Ferrer and started on oral bicarb 

replacement.  Patient has CKD stage IV.


RADHA resolved


Cardiology-Dr. Oneil's input appreciated.   recommended outpatient stress 

test. 


dc coreg given bradycardia / 2-3 sec pause yesterday morning, monitor on 

telemetry, awaiting further input from cardiology


Elevated troponin secondary to acute viral myocarditis.  Patient is currently 

asymptomatic. Continue aspirin


Continue steroid and vitamins.





Dispo: likely SNF vs rehab. patient reluctant to SNF, agreeable to rehab


will discuss further with SW and PT








Time Spent Managing Pts Care (In Minutes): 35

## 2022-01-08 NOTE — PN
Date of Progress Note:  01/07/2022



Ms. Faye has been followed intermittently by  __________for DKA and COVID.  Has been on Coreg, but
 the last overnight, has had a few second pauses while she was sleeping.  We recommend to discontinue
 her Coreg and watch her.  She has had a normal echo.  She can go home whenever it is okay with Dr. DEONNA lee.





NB/SHAMA

DD:  01/08/2022 13:16:48Voice ID:  [id number]

DT:  01/08/2022 16:14:44Report ID:  [id number]

## 2022-09-27 ENCOUNTER — HOSPITAL ENCOUNTER (EMERGENCY)
Dept: HOSPITAL 97 - ER | Age: 67
Discharge: HOME | End: 2022-09-27
Payer: COMMERCIAL

## 2022-09-27 DIAGNOSIS — I95.9: Primary | ICD-10-CM

## 2022-09-27 DIAGNOSIS — R55: ICD-10-CM

## 2022-09-27 DIAGNOSIS — I10: ICD-10-CM

## 2022-09-27 DIAGNOSIS — E11.9: ICD-10-CM

## 2022-09-27 LAB
ALBUMIN SERPL BCP-MCNC: 3.2 G/DL (ref 3.4–5)
ALP SERPL-CCNC: 84 U/L (ref 45–117)
ALT SERPL W P-5'-P-CCNC: 13 U/L (ref 12–78)
AST SERPL W P-5'-P-CCNC: 12 U/L (ref 15–37)
BUN BLD-MCNC: 53 MG/DL (ref 7–18)
GLUCOSE SERPLBLD-MCNC: 72 MG/DL (ref 74–106)
HCT VFR BLD CALC: 36.9 % (ref 36–45)
INR BLD: 0.99
LYMPHOCYTES # SPEC AUTO: 1 K/UL (ref 0.7–4.9)
MAGNESIUM SERPL-MCNC: 2.1 MG/DL (ref 1.8–2.4)
MCV RBC: 78.8 FL (ref 80–100)
NT-PROBNP SERPL-MCNC: 4788 PG/ML (ref ?–125)
PMV BLD: 8 FL (ref 7.6–11.3)
POTASSIUM SERPL-SCNC: 4.4 MMOL/L (ref 3.5–5.1)
RBC # BLD: 4.69 M/UL (ref 3.86–4.86)
TROPONIN I SERPL HS-MCNC: 71.9 PG/ML (ref ?–58.9)

## 2022-09-27 PROCEDURE — 70450 CT HEAD/BRAIN W/O DYE: CPT

## 2022-09-27 PROCEDURE — 80048 BASIC METABOLIC PNL TOTAL CA: CPT

## 2022-09-27 PROCEDURE — 83880 ASSAY OF NATRIURETIC PEPTIDE: CPT

## 2022-09-27 PROCEDURE — 85610 PROTHROMBIN TIME: CPT

## 2022-09-27 PROCEDURE — 99284 EMERGENCY DEPT VISIT MOD MDM: CPT

## 2022-09-27 PROCEDURE — 71045 X-RAY EXAM CHEST 1 VIEW: CPT

## 2022-09-27 PROCEDURE — 85025 COMPLETE CBC W/AUTO DIFF WBC: CPT

## 2022-09-27 PROCEDURE — 36415 COLL VENOUS BLD VENIPUNCTURE: CPT

## 2022-09-27 PROCEDURE — 80076 HEPATIC FUNCTION PANEL: CPT

## 2022-09-27 PROCEDURE — 84484 ASSAY OF TROPONIN QUANT: CPT

## 2022-09-27 PROCEDURE — 93005 ELECTROCARDIOGRAM TRACING: CPT

## 2022-09-27 PROCEDURE — 83735 ASSAY OF MAGNESIUM: CPT

## 2022-09-27 NOTE — ER
Nurse's Notes                                                                                     

 [facility]                                                                 

Name: Josette Faye                                                                                 

Age: 66 yrs                                                                                       

Sex: Female                                                                                       

: 1955                                                                                   

MRN: [id number]                                                                                   

Arrival Date: 2022                                                                          

Time: 14:15                                                                                       

Account#: [id number]                                                                            

Bed 20                                                                                            

Private MD:                                                                                       

Diagnosis: Hypotension, unspecified;Near syncope                                                  

                                                                                                  

Presentation:                                                                                     

                                                                                             

14:23 Chief complaint: EMS states: while at physical therapy pt became hypotensive. pt states jh6 

      that she has had a similar episode a couple of weeks ago but was never told a reason        

      for the hypotension. pt alert without complaints at this time. BS 77 on scene with ems.     

      Coronavirus screen: Vaccine status: Patient reports receiving the 2nd dose of the covid     

      vaccine. Ebola Screen: Patient negative for fever greater than or equal to 101.5            

      degrees Fahrenheit, and additional compatible Ebola Virus Disease symptoms Patient          

      denies exposure to infectious person. Patient denies travel to an Ebola-affected area       

      in the 21 days before illness onset. Initial Sepsis Screen: Does the patient meet any 2     

      criteria? No. Patient's initial sepsis screen is negative. Does the patient have a          

      suspected source of infection? No. Patient's initial sepsis screen is negative. Risk        

      Assessment: Do you want to hurt yourself or someone else? Patient reports no desire to      

      harm self or others. Onset of symptoms was 2022.                              

14:23 Method Of Arrival: EMS: [facility]                                                [facility] 

14:23 Acuity: AMPARO 3                                                                           jh6 

                                                                                                  

Triage Assessment:                                                                                

14:10 General: Appears in no apparent distress. Behavior is calm, cooperative, appropriate    jh6 

      for age.                                                                                    

14:10 Pain: Denies pain.                                                                      [facility] 

                                                                                                  

Historical:                                                                                       

- PMHx:                                                                                           

14:26 Diabetes - IDDM; Hypertension;                                                          6 

                                                                                                  

- Immunization history:: Adult Immunizations up to date.                                          

- Social history:: Smoking status: Patient denies any tobacco usage or history of.                

                                                                                                  

                                                                                                  

Screenin:10 Abuse screen: Denies threats or abuse. Denies injuries from another. Nutritional        [facility] 

      screening: No deficits noted. Tuberculosis screening: No symptoms or risk factors           

      identified. Fall Risk IV access (20 points).                                                

                                                                                                  

Assessment:                                                                                       

14:35 General: Appears see triage note.// .                                                   jh6 

15:30 Reassessment: Patient and/or family updated on plan of care and expected duration. Pain 6 

      level reassessed. Patient is alert, oriented x 3, equal unlabored respirations, skin        

      warm/dry/pink. Patient denies pain at this time. Patient states feeling better.             

16:30 Reassessment: No changes from previously documented assessment. Patient and/or family   jh6 

      updated on plan of care and expected duration. Pain level reassessed. Patient is alert,     

      oriented x 3, equal unlabored respirations, skin warm/dry/pink. pt did report that same     

      s/s have happened in the past but they were unable to figure out why she had episodes       

      of hypotension. states that she is normally slightly hypertensive and takes meds once a     

      day.                                                                                        

17:30 Reassessment: No changes from previously documented assessment.                         6 

18:25 Reassessment: Patient and/or family updated on plan of care and expected duration. Pain jh6 

      level reassessed. Patient is alert, oriented x 3, equal unlabored respirations, skin        

      warm/dry/pink. pt not wanting to stay in hospital for eval states that she feels tired      

      but without any other complaints. pt does agree to check a repeat cardiac enzyme before     

      any discission is made.                                                                     

19:40 General: Appears in no apparent distress. Behavior is calm, cooperative. Pain: Denies   kl  

      pain. Neuro: No deficits noted. Cardiovascular: No deficits noted. Respiratory: No          

      deficits noted. GI: No deficits noted. No signs and/or symptoms were reported involving     

      the gastrointestinal system. : No deficits noted. No signs and/or symptoms were           

      reported regarding the genitourinary system. EENT: No deficits noted. No signs and/or       

      symptoms were reported regarding the EENT system. Derm: No deficits noted. No signs         

      and/or symptoms reported regarding the dermatologic system.                                 

                                                                                                  

Vital Signs:                                                                                      

14:23  / 83; Pulse 70; Resp 17; Temp 97.8(O); Pulse Ox 95% ; Weight 106.59 kg; Height 5 jh6 

      ft. 5 in. (165.10 cm); Pain 0/10;                                                           

17:30  / 84 Supine; Pulse 67; Resp 17;                                                  jh6 

17:32  / 90 Sitting; Pulse 74; Resp 17; Pulse Ox 100% ;                                 jh6 

17:35  / 80; Pulse 79; Resp 17; Pulse Ox 100% ;                                         jh6 

18:27  / 73; Pulse 64; Resp 17; Temp 97.8(O); Pulse Ox 100% ; Pain 0/10;                [facility] 

19:55  / 97; Pulse 64; Resp 16; Pulse Ox 98% on R/A; Pain 0/10;                           

14:23 Body Mass Index 39.11 (106.59 kg, 165.10 cm)                                            [facility] 

                                                                                                  

ED Course:                                                                                        

14:10 Arm band placed on left wrist. Patient placed in the treatment room, on a stretcher, on [facility] 

      cardiac monitor, on pulse oximetry.                                                         

14:15 Patient arrived in ED.                                                                  j 

14:23 Rivka Cleaning, RN is Primary Nurse.                                                 [facility] 

14:26 Triage completed.                                                                       [facility] 

14:27 Placed in gown. Bed in low position. Call light in reach. Side rails up X2.             [facility] 

14:27 Maintain EMS IV. IV Changed dressing on right hand Flushed right hand.                  [facility] 

14:40 Dallin Mishra NP is PHCP.                                                           pm1 

14:40 Raymond Philip MD is Attending Physician.                                             pm1 

15:43 CT Head Brain wo Cont In Process Unspecified.                                           [facility]

16:03 XRAY Chest (1 view) Sent.                                                               [facility] 

16:09 XRAY Chest (1 view) In Process Unspecified.                                             [facility]

19:23 Primary Nurse role handed off by Rivka Cleaning RN                                  2 

20:28 No provider procedures requiring assistance completed. IV discontinued, intact,         kl  

      bleeding controlled, No redness/swelling at site. Pressure dressing applied.                

                                                                                                  

Administered Medications:                                                                         

17:52 Drug: Aspirin 325 mg Route: PO;                                                         [facility] 

20:05 Follow up: Response: No adverse reaction                                                  

                                                                                                  

                                                                                                  

Medication:                                                                                       

17:51 VIS not applicable for this client.                                                     [facility] 

                                                                                                  

Outcome:                                                                                          

19:52 Discharge ordered by MD.                                                                pm1 

20:28 Discharged to home ambulatory.                                                            

20:28 Condition: good                                                                             

20:28 Discharge instructions given to patient, Instructed on discharge instructions, follow       

      up and referral plans. Demonstrated understanding of instructions, follow-up care.          

20:28 Patient left the ED.                                                                      

                                                                                                  

Signatures:                                                                                       

Dispatcher [employer]                           EDRobina Heard RN RN kl Marinas, Patrick, NP                    NP   pm1                                                  

Fred Roche RN RN jd3 [place], MyKena                            mw2                                                  

Rivka Cleaning RN RN   [facility]                                                  

                                                                                                  

**************************************************************************************************

## 2022-09-27 NOTE — RAD REPORT
EXAM DESCRIPTION:  CT - Head Brain Wo Cont - 9/27/2022 3:41 pm

 

CLINICAL HISTORY:  Syncope

 

COMPARISON:  2021

 

TECHNIQUE:  Computed axial tomography of the head was obtained. IV contrast was not requested.

 

All CT scans are performed using dose optimization technique as appropriate and may include automated
 exposure control or mA/KV adjustment according to patient size.

 

FINDINGS:  An intracranial  bleed is not seen .

 

Prominence of the fourth ventricle unchanged. Third and lateral ventricles normal caliber.

 

No extra-axial fluid collection is noted.

 

No significant hypodensity within the brain

 

Fluid within the sinuses/ mastoids is not seen.

 

IMPRESSION:  Prominence of fourth ventricle is unchanged. This probably is not significant. Trapped f
ourth ventricle secondary to obstructing hydrocephalus can also result in this appearance and should 
be correlated clinically

## 2022-09-27 NOTE — XMS REPORT
Continuity of Care Document

                          Created on:2022



Patient:MARIN LANGLEY

Sex:Female

:1955

External Reference #:[id number]





Demographics







                          Address                   [address]



                                                    [place] [address]

 

                          Home Phone                [phone]

 

                          Work Phone                [phone]

 

                          Mobile Phone              [phone]

 

                          Email Address             [email or web address]

 

                          Preferred Language        English

 

                          Marital Status            Unknown

 

                          Voodoo Affiliation     Unknown

 

                          Race                      Unknown

 

                          Additional Race(s)        White



                                                    Unavailable

 

                          Ethnic Group              Unknown









Author







                          Organization              [facility]

t

 

                          Address                   [address]



                                                    [place] [address]

 

                          Phone                     [phone]









Support







                Name            Relationship    Address         Phone

 

                Leidy Marrero Daughter        Unavailable     [phone]



                                                [place] [address] 

 

                Jasiel Barber   Significant Other [address]    [phone]

[phone]



                                                [place] [address] 

 

                1               RAY             Unavailable     Unavailable

 

                2               LEIDY           Unavailable     Unavailable

 

                3               Unavailable     Unavailable     Unavailable

 

                Jasiel Barber   Life Partner    [address]    [phone]



                                                [place] [address] 

 

                Kristy Luther     Child           [address] [phone]



                                                [place] [address] 









Care Team Providers







                    Name                Role                Phone

 

                    Ashley Ulloa MD Primary Care Physician [phone]

[phone]

 

                    JESICA MICHAEL NATASHA Attending Clinician Unavailable

 

                    [id number]              Attending Clinician Unavailable

 

                    Shanita Bernard MD Attending Clinician [phone]

 

                    ANRDEY VIERA        Attending Clinician Unavailable

 

                    Andrey Viera MD     Attending Clinician [phone]

 

                    ASHLEY ULLOA Attending Clinician Unavailable

 

                    [id number]               Attending Clinician Unavailable

 

                    Ashley Ulloa MD Attending Clinician [phone]

[phone]

 

                    JOSE ALFREDO STANFORD     Attending Clinician Unavailable

 

                    ALFREDO BEARD     Attending Clinician Unavailable

 

                    JESICA MICHAEL NATASHA Admitting Clinician Unavailable

 

                    [id number]              Admitting Clinician Unavailable

 

                    SHANITA BERNARD  Admitting Clinician Unavailable









Payers







           Payer Name Policy Type Policy Number Effective Date Expiration Date S

ource

 

           BCBS       BCTP       [id number]                       

 

           [employer]BS       2          [id number] 2021 00:00:00            







Problems







       Condition Condition Condition Status Onset  Resolution Last   Treating Co

mments 

Source



       Name   Details Category        Date   Date   Treatment Clinician        



                                                 Date                 

 

       Congestive Congestive Disease Active                              K

alexandria



       heart  heart                6                               [facility]



       failure failure               00:00:                             



                                   00                                 

 

       Hyperlipid Hyperlipid Disease Active                              K

alexandria henry   emia                 6-                               [facility]



       associated associated               00:00:                             



       with type with type               00                                 



       2 diabetes 2 diabetes                                                  



       mellitus mellitus                                                  

 

       Chronic Chronic Disease Active                       Overview: Josiane lin



       diastolic diastolic               2-15                        Formattin S

[facility]



       heart  heart                00:00:                      g of this 



       failure failure               00                          note   



                                                               might be 



                                                               different 



                                                               from the 



                                                               original. 



                                                               Formattin 



                                                               g of this 



                                                               note   



                                                               might be 



                                                               different 



                                                               from the 



                                                               original. 



                                                               On     



                                                               spirnolac 



                                                               tone 50 



                                                               mg daily 



                                                               and lasix 



                                                               Counseled 



                                                               on low 



                                                               sodium 



                                                               diet   



                                                               ....Advis 



                                                               ed to  



                                                               resume 



                                                               lasix and 



                                                               spironola 



                                                               ctone  



                                                               Last   



                                                               Assessmen 



                                                               t & Plan: 



                                                               Formattin 



                                                               g of this 



                                                               note   



                                                               might be 



                                                               different 



                                                               from the 



                                                               original. 



                                                               Continue 



                                                               same and 



                                                               monitor 



                                                               weights 



                                                               and vol 



                                                               status 



                                                               Advised 



                                                               close  



                                                               cardiolog 



                                                               y follow 



                                                               up and 



                                                               low    



                                                               sodium 



                                                               diet   



                                                               counseled 

 

       Background Background Disease Active                              PREETI ibrahim



       diabetic diabetic               3-06                               [facility]

d



       retinopath retinopath               00:00:                             



       y of right y of right               00                                 



       eye    eye                                                     



       determined determined                                                  



       by     by                                                      



       examinatio examinatio                                                  



       n      n                                                       

 

       RADHA (acute RADHA (acute Disease Active                              U

nivers



       kidney kidney               9-26                               ity of



       injury) injury)               00:00:                             [place]



                                                                    [facility]



                                                                      [facility]

 

       Obesity Obesity Disease Active                              [facility]



       (BMI   (BMI                 9-26                               ity of



       30-39.9) 30-39.9)               00:00:                             [place]



                                                                    [facility]



                                                                      [facility]anch

 

       Thyromegal Thyromegal Disease Active                              U

radhames



       y      y                    6-22                               ity of



                                   00:00:                             [place]



                                                                    [facility]



                                                                      [facility]anch

 

       Callus of Callus of Disease Active                              Uni

vers



       foot   foot                 6-22                               ity of



                                   00:00:                             [place]



                                                                    [facility]



                                                                      [facility]anch

 

       Vitamin D Vitamin D Disease Active                              Uni

vers



       deficiency deficiency               4-08                               it

y of



                                   00:00:                             [place]



                                                                    [facility]



                                                                      [facility]anch

 

       Essential Essential Disease Active                              Uni

vers



       hypertensi hypertensi               4-06                               it

y of



       on, benign on, benign               00:00:                             [facility]



                                                                    [facility]



                                                                      [facility]

 

       Stage 3b Stage 3b Disease Active                              Unive

rs



       chronic chronic               4-06                               ity of



       kidney kidney               00:00:                             [place]



       disease disease                                                Medical



                                                                      Branch

 

       Dyslipidem Dyslipidem Disease Active                              U

nivers



       ia     ia                                                  ity of



                                   00:00:                             [place]



                                   00                                 Medical



                                                                      Branch

 

       Essential Essential Disease Active                       Overview: 

Abe[facility]



       hypertensi hypertensi                                       Formattin

 [place]



       on     on                   00:00:                      g of this 



                                   00                          note   



                                                               might be 



                                                               different 



                                                               from the 



                                                               original. 



                                                               Formattin 



                                                               g of this 



                                                               note   



                                                               might be 



                                                               different 



                                                               from the 



                                                               original. 



                                                               On coreg 



                                                               3.125 mg 



                                                               bid and 



                                                               amlodipin 



                                                               e 5 mg 



                                                               daily  



                                                               Last   



                                                               Assessmen 



                                                               t & Plan: 



                                                               Formattin 



                                                               g of this 



                                                               note   



                                                               might be 



                                                               different 



                                                               from the 



                                                               original. 



                                                               Advised 



                                                               to     



                                                               decrease 



                                                               amlodipin 



                                                               e to 5 mg 



                                                               daily  

 

       Hyperlipid Hyperlipid Disease Active                              PREETI henry   emia                                                [facility]



                                   00:00:                             



                                   00                                 

 

       Uncontroll Uncontroll Disease Active                       Overview

: [place]



       ed type 2 ed type 2                                       [facility]



       diabetes diabetes               00:00:                      g of this 



       with   with                 00                          note   



       neuropathy neuropathy                                           might be 



                                                               different 



                                                               from the 



                                                               original. 



                                                               Formattin 



                                                               g of this 



                                                               note   



                                                               might be 



                                                               different 



                                                               from the 



                                                               original. 



                                                               On     



                                                               insulin 



                                                               Will   



                                                               updated 



                                                               [id number] 



                                                               Assessmen 



                                                               t & Plan: 



                                                               Formattin 



                                                               g of this 



                                                               note   



                                                               might be 



                                                               different 



                                                               from the 



                                                               original. 



                                                               Will need 



                                                               to check 



                                                               A1C at 



                                                               next   



                                                               visit  







Allergies, Adverse Reactions, Alerts







       Allergy Allergy Status Severity Reaction(s) Onset  Inactive Treating Comm

ents 

Source



       Name   Type                        Date   Date   Clinician        

 

       NO KNOWN Drug   Active                                           [facility]



       ALLERGIE Class                                                   ity of



       S                                                              [facility]







Social History







           Social Habit Start Date Stop Date  Quantity   Comments   Source

 

           Exposure to 2022 Not sure              [facility]-CoV-2 00:00:00   12:29:00                         [facility]



           (event)                                                [facility]

 

           Alcohol intake 2021 0 /d                  [facility]



                      00:00:00   00:00:00                         [facility]

 

           Tobacco use and 2021 Smokeless tobacco            [facility]



           exposure   00:00:00   00:00:00   non-user              

 

           Sex Assigned At 1955                       [facility]



           [facility]









                Smoking Status  Start Date      Stop Date       Source

 

                Never smoked tobacco                                 Abeab kaufman







Medications







       Ordered Filled Start  Stop   Current Ordering Indication Dosage Frequency

 Signature

                    Comments            Components          Source



     Medication Medication Date Date Medication? Clinician                (SIG) 

          



     Name Name                                                   

 

     insulin            Yes       [id number] 60U       inject 60           U

nivers



     degludec      8-24                               Units           ity of



     (TRESIBA      00:00:                               under the           [place]

[place]



     FLEXTOUCH      00                                 skin every           Medi

erna



     U-200) 200                                         morning.           Branc

h



     unit/mL (3                                         E11.65           



     mL) InPn                                                        

 

     atorvastati            Yes       [id number] 20mg      Take 1          

 [facility]



     n 20 mg      8-24                               tablet by           ity of



     tablet      00:00:                               mouth at           [place]



               00                                 bedtime.           Medical



                                                                 Branch

 

     insulin            Yes       [id number] 60U       inject 60           U

nivers



     degludec      8-24                               Units           ity of



     (TRESIBA      00:00:                               under the           [place]

s



     FLEXTOUCH      00                                 skin every           Medi

erna



     U-200) 200                                         morning.           Branc

h



     unit/mL (3                                         E11.65           



     mL) InPn                                                        

 

     atorvastati            Yes       [id number] 20mg      Take 1          

 [facility]



     n 20 mg      8-24                               tablet by           ity of



     tablet      00:00:                               mouth at           [place]



               00                                 bedtime.           Medical



                                                                 Branch

 

     insulin            Yes       [id number] 60U       inject 60           U

nivers



     degludec      8-24                               Units           ity of



     (TRESIBA      00:00:                               under the           [place]

s



     FLEXTOUCH      00                                 skin every           Medi

erna



     U-200) 200                                         morning.           Branc

h



     unit/mL (3                                         E11.65           



     mL) InPn                                                        

 

     atorvastati            Yes       [id number] 20mg      Take 1          

 [facility]



     n 20 mg      8-24                               tablet by           ity of



     tablet      00:00:                               mouth at           [place]



               00                                 bedtime.           Medical



                                                                 Branch

 

     carvediloL            Yes                      TAKE BY           [facility]

ers



     6.25 mg      7-21                               MOUTH 1           ity of



     tablet      00:00:                               TABLET IN           [place]



                                                [facility]



                                                  AND IN           Branch



                                                  EVEING           



                                                  TAKE WITH           



                                                  MEALS           

 

     carvediloL            Yes                      TAKE BY           [facility]

ers



     6.25 mg      7-21                               MOUTH 1           ity of



     tablet      00:00:                               TABLET IN           [place]



                                                [facility]



                                                  AND IN           Branch



                                                  EVEING           



                                                  TAKE WITH           



                                                  MEALS           

 

     carvediloL            Yes                      TAKE BY           [facility]

ers



     6.25 mg      7-21                               MOUTH 1           ity of



     tablet      00:00:                               TABLET IN           [place]



                                                [facility]



                                                  AND IN           Branch



                                                  EVEING           



                                                  TAKE WITH           



                                                  MEALS           

 

     insulin            Yes       [id number] 10U       inject 10           U

nivers



     aspart      4-13                               Units           ity of



     U-100      00:00:                               under the           [place]



     (NOVOLOG      00                                 skin 3           Medical



     FLEXPEN                                         (three)           Branch



     U-100                                         times           



     INSULIN)                                         daily           



     100 unit/mL                                         before           



     (3 mL)                                         meals.           



     injection                                         E11.65           

 

     insulin      0      Yes       [id number] 50U       inject 50           U

nivers



     degludec      4-13                               Units           ity of



     (TRESIBA      00:00:                               under the           [place]

s



     FLEXTOUCH      00                                 skin every           Medi

erna



     U-200) 200                                         morning.           Branc

h



     unit/mL (3                                         E11.65           



     mL) InPn                                                        

 

     insulin      0      Yes       [id number] 10U       inject 10           U

nivers



     aspart      4-13                               Units           ity of



     U-100      00:00:                               under the           [place]



     (NOVOLOG      00                                 skin 3           Medical



     FLEXPEN                                         (three)           Branch



     U-100                                         times           



     INSULIN)                                         daily           



     100 unit/mL                                         before           



     (3 mL)                                         meals.           



     injection                                         E11.           

 

     insulin            Yes       [id number] 50U       inject 50           U

nivers



     degludec      4-13                               Units           ity of



     (TRESIBA      00:00:                               under the           [place]

[place]



     FLEXTOUCH      00                                 skin every           Medi

erna



     U-200) 200                                         morning.           Branc

h



     unit/mL (3                                         E11.65           



     mL) InPn                                                        

 

     insulin            Yes       [id number] 10U       inject 10           U

nivers



     aspart      4-13                               Units           ity of



     U-100      00:00:                               under the           [place]



     (NOVOLOG      00                                 skin 3           Medical



     FLEXPEN                                         (three)           Branch



     U-100                                         times           



     INSULIN)                                         daily           



     100 unit/mL                                         before           



     (3 mL)                                         meals.           



     injection                                         E11.65           

 

     insulin      0      Yes       [id number] 50U       inject 50           U

nivers



     degludec      4-13                               Units           ity of



     (TRESIBA      00:00:                               under the           [place]

[place]



     FLEXTOUCH      00                                 skin every           Medi

erna



     U-200) 200                                         morning.           Branc

h



     unit/mL (3                                         E11.65           



     mL) InPn                                                        

 

     insulin            Yes       [id number] 10U       inject 10           U

nivers



     aspart      4-13                               Units           ity of



     U-100      00:00:                               under the           [place]



     (NOVOLOG      00                                 skin 3           Medical



     FLEXPEN                                         (three)           Branch



     U-100                                         times           



     INSULIN)                                         daily           



     100 unit/mL                                         before           



     (3 mL)                                         meals.           



     injection                                         E11.65           

 

     insulin      0      Yes       [id number] 10U       inject 10           U

nivers



     aspart      4-13                               Units           ity of



     U-100      00:00:                               under the           [place]



     (NOVOLOG      00                                 skin 3           Medical



     FLEXPEN                                         (three)           Branch



     U-100                                         times           



     INSULIN)                                         daily           



     100 unit/mL                                         before           



     (3 mL)                                         meals.           



     injection                                         E11.65           

 

     insulin            Yes       [id number] 10U       inject 10           U

nivers



     aspart      4-13                               Units           ity of



     U-100      00:00:                               under the           [place]



     (NOVOLOG      00                                 skin 3           Medical



     FLEXPEN                                         (three)           Branch



     U-100                                         times           



     INSULIN)                                         daily           



     100 unit/mL                                         before           



     (3 mL)                                         meals.           



     injection                                         E11.65           

 

     insulin      2022- No        [id number] 50U       inject 50           

[facility]



     degludec      4-13 08-24                          Units           ity of



     (TRESIBA      00:00: 00:00                          under the           [facility]

as



     FLEXTOUCH      00   :00                           skin every           Medi

erna



     U-200) 200                                         morning.           Branc

h



     unit/mL (3                                         E11.65           



     mL) InPn                                                        

 

     insulin      2022- No        [id number] 50U       inject 50           

[facility]



     degludec      4-13 08-24                          Units           ity of



     (TRESIBA      00:00: 00:00                          under the           [facility]

as



     FLEXTOUCH      00   :00                           skin every           Medi

erna



     U-200) 200                                         morning.           Branc

h



     unit/mL (3                                         E11.65           



     mL) InPn                                                        

 

     ATORVASTATI            Yes       [id number]           TAKE 1          

 [facility]



     N 20 mg      2-26                               TABLET BY           ity of



     tablet      00:00:                               MOUTH           [place]



               00                                 EVERYDAY           Medical



                                                  AT BEDTIME           [facility]

 

     ATORVASTATI            Yes       [id number]           TAKE 1          

 [facility]



     N 20 mg      2-26                               TABLET BY           ity of



     tablet      00:00:                               MOUTH           [place]



               00                                 EVERYDAY           Medical



                                                  AT BEDTIME           [facility]

 

     ATORVASTATI            Yes       [id number]           TAKE 1          

 [facility]



     N 20 mg      2-26                               TABLET BY           ity of



     tablet      00:00:                               MOUTH           [place]



               00                                 EVERYDAY           Medical



                                                  AT BEDTIME           Branch

 

     ATORVASTATI      0 - No        [id number]           TAKE 1         

  [facility]



     N 20 mg      2-26 08-24                          TABLET BY           ity of



     tablet      00:00: 00:00                          MOUTH           [place]



               00   :00                           EVERYDAY           Medical



                                                  AT BEDTIME           Branch

 

     ATORVASTATI      2022- No        [id number]           TAKE 1         

  [facility]



     N 20 mg      2-26 08-24                          TABLET BY           ity of



     tablet      00:00: 00:00                          MOUTH           [place]



               00   :00                           EVERYDAY           Medical



                                                  AT BEDTIME           Branch

 

     TRESIBA      2022- No        [id number] 60U       INJECT 60           

[facility]



     FLEXTOUCH      2-26 04-13                          UNITS           ity of



     U-200 200      00:00: 00:00                          UNDER THE           [place]

[place]



     unit/mL (3      00   :00                           SKIN EVERY           Med

ical



     mL) InPn                                         MORNING.           Branch



                                                  E11.65           

 

     TRESIBA      -0 2- No        [id number] 60U       INJECT 60           

[facility]



     FLEXTOUCH      2-26 04-13                          UNITS           ity of



     U-200 200      00:00: 00:00                          UNDER THE           [facility]

[facility]



     unit/mL (3      00   :00                           SKIN EVERY           Med

ical



     mL) InPn                                         MORNING.           Branch



                                                  E11.65           

 

     Aspirin 81      -0      Yes       [id number] 81mg      Take 81 mg        

   [facility]



     MG oral      2-17                               by mouth           [facility]



     Chewable      14:04:                               daily           



     Tablet      23                                                

 

     Gabapentin      -0      Yes       [id number] 900mg      Take 3           

[facility]



     300 MG oral      8-23                               capsules           [facility]

old



     Capsule      00:00:                               (900 mg           



               00                                 total) by           



                                                  mouth           



                                                  daily           

 

     aspirin 81      -0      Yes            81mg      Take 81 mg           U

nivers



     mg chewable      6-29                               by mouth           ity 

of



     tablet      14:51:                               daily.           [address]

 

     gabapentin      -0      Yes            300mg      Take 300           Un

kimi



      mg      6-29                               mg by           ity of



     tablet,      14:51:                               mouth           [place]



     extended      07                                 daily.           Medical



     release 24                                                        Branch



     hr                                                          

 

     aspirin 81      -0      Yes            81mg      Take 81 mg           U

nivers



     mg chewable      6-29                               by mouth           ity 

of



     tablet      14:51:                               daily.           [address]

 

     gabapentin      -0      Yes            300mg      Take 300           Un

kimi



      mg      6-29                               mg by           ity of



     tablet,      14:51:                               mouth           [place]



     extended      07                                 daily.           Medical



     release 24                                                        Branch



     hr                                                          

 

     aspirin 81      -0      Yes            81mg      Take 81 mg           U

nivers



     mg chewable      6-29                               by mouth           ity 

of



     tablet      14:51:                               daily.           [address]

 

     gabapentin      -0      Yes            300mg      Take 300           Un

kimi



      mg      6-29                               mg by           ity of



     tablet,      14:51:                               mouth           [place]



     extended      07                                 daily.           Medical



     release 24                                                        Branch



     hr                                                          

 

     aspirin 81      -0      Yes            81mg      Take 81 mg           U

nivers



     mg chewable      6-29                               by mouth           ity 

of



     tablet      14:51:                               daily.           [address]

 

     gabapentin      -0      Yes            300mg      Take 300           Un

kimi



      mg      6-29                               mg by           ity of



     tablet,      14:51:                               mouth           [place]



     extended      07                                 daily.           Medical



     release 24                                                        Branch



     hr                                                          

 

     aspirin 81      -0      Yes            81mg      Take 81 mg           U

nivers



     mg chewable      6-29                               by mouth           ity 

of



     tablet      14:51:                               daily.           [place]



               07                                                Medical



                                                                 Branch

 

     gabapentin      -0      Yes            300mg      Take 300           Un

kimi



      mg      6-29                               mg by           ity of



     tablet,      14:51:                               mouth           [place]



     extended      07                                 daily.           Medical



     release 24                                                        Branch



     hr                                                          

 

     aspirin 81      -0      Yes            81mg      Take 81 mg           U

nivers



     mg chewable      6-29                               by mouth           ity 

of



     tablet      14:51:                               daily.           [place]



               07                                                Medical



                                                                 Branch

 

     gabapentin      -0      Yes            300mg      Take 300           Un

kimi



      mg      6-29                               mg by           ity of



     tablet,      14:51:                               mouth           [place]



     extended      07                                 daily.           Medical



     release 24                                                        Branch



     hr                                                          

 

     furosemide      0      Yes                      every           Univer

s



     40 mg      6-29                               morning           ity of



     tablet      00:00:                               and            [place]



               00                                 evening.           Medical



                                                                 Branch

 

     furosemide      0      Yes                      every           Univer

s



     40 mg      6-29                               morning           ity of



     tablet      00:00:                               and            [place]



               00                                 evening.           Medical



                                                                 Branch

 

     furosemide      0      Yes                      every           Univer

s



     40 mg      6-29                               morning           ity of



     tablet      00:00:                               and            [place]



               00                                 evening.           Medical



                                                                 Branch

 

     furosemide      -0      Yes                      every           Univer

s



     40 mg      6-29                               morning           ity of



     tablet      00:00:                               and            [place]



               00                                 evening.           Medical



                                                                 Branch

 

     furosemide      0      Yes                      every           Univer

s



     40 mg      6-29                               morning           ity of



     tablet      00:00:                               and            [place]



               00                                 evening.           Medical



                                                                 Branch

 

     furosemide      0      Yes                      every           Univer

s



     40 mg      6-29                               morning           ity of



     tablet      00:00:                               and            [place]



               00                                 evening.           Medical



                                                                 Branch

 

     insulin      2022- No        [id number] 20U       inject 20           

[facility]



     aspart       04-13                          Units           ity of



     U-100      00:00: 00:00                          under the           [place]



     (NOVOLOG      00   :00                           skin 3           Medical



     FLEXPEN                                         (three)           Branch



     U-100                                         times           



     INSULIN)                                         daily           



     100 unit/mL                                         before           



     (3 mL)                                         meals.           



     injection                                         E11.65           

 

     insulin      2022- No        [id number] 20U       inject 20           

[facility]



     aspart      6-29 04-13                          Units           ity of



     U-100      00:00: 00:00                          under the           [place]



     (NOVOLOG      00   :00                           skin 3           Medical



     FLEXPEN                                         (three)           Branch



     U-100                                         times           



     INSULIN)                                         daily           



     100 unit/mL                                         before           



     (3 mL)                                         meals.           



     injection                                         E11.65           

 

     spironolact      0      Yes            1{tbl}      Take 1           Un

kimi



     one 50 mg      6-25                               tablet by           ity o

f



     tablet      00:00:                               mouth.           [place]



                                                               [facility]

 

     spironolact      0      Yes            1{tbl}      Take 1           Un

kimi



     one 50 mg      6-25                               tablet by           ity o

f



     tablet      00:00:                               mouth.           [place]



                                                               [facility]

 

     spironolact      0      Yes            1{tbl}      Take 1           Un

kimi



     one 50 mg      6-25                               tablet by           ity o

f



     tablet      00:00:                               mouth.           [place]



                                                               [facility]

 

     spironolact      0      Yes            1{tbl}      Take 1           Un

kimi



     one 50 mg      6-25                               tablet by           ity o

f



     tablet      00:00:                               mouth.           [place]



                                                               [facility]

 

     spironolact            Yes            1{tbl}      Take 1           Un

kimi



     one 50 mg      6-25                               tablet by           ity o

f



     tablet      00:00:                               mouth.           [place]



                                                               [facility]

 

     spironolact      0      Yes            1{tbl}      Take 1           Un

kimi



     one 50 mg      6-25                               tablet by           ity o

f



     tablet      00:00:                               mouth.           [place]



                                                               [facility]

 

     Amlodipine      0      Yes       [id number] 1{tbl}      Take 1          

 [facility]



     Besylate 5      6-25                               tablet by           [facility]

old



     MG oral      00:00:                               mouth           



     Tablet      00                                 daily           

 

     Furosemide      0      Yes       [id number] 1{tbl}      Take 1          

 [facility]



     40 MG oral      6-25                               tablet by           [facility]

old



     Tablet      00:00:                               mouth           



               00                                 daily           

 

     Spironolact      -0      Yes       [id number] 1{tbl}      Take 1         

  [facility]



     one 50 MG      6-25                               tablet by           [facility]

ld



     oral Tablet      00:00:                               mouth           



               00                                 daily           

 

     carvediloL      0      Yes            1{tbl}      Take 1           Uni

vers



     3.125 mg      6-23                               tablet by           ity of



     tablet      00:00:                               mouth.           [place]



                                                               [facility]

 

     carvediloL      -0      Yes            1{tbl}      Take 1           Uni

vers



     3.125 mg      6-23                               tablet by           ity of



     tablet      00:00:                               mouth.           [place]



                                                               [facility]

 

     carvediloL      -0      Yes            1{tbl}      Take 1           Uni

vers



     3.125 mg      6-23                               tablet by           ity of



     tablet      00:00:                               mouth.           [place]



                                                               [facility]

 

     Carvedilol      2021-0      Yes       [id number] 1{tbl}      Take 1          

 [facility]



     3.125 MG      6-23                               tablet by           [facility]

d



     oral Tablet      00:00:                               mouth           



               00                                 daily           

 

     carvediloL      2022- No             1{tbl}      Take 1           Un

kimi



     3.125 mg      6-23 08-24                          tablet by           ity o

f



     tablet      00:00: 00:00                          mouth.           [place]



               00   :00                                          Medical



                                                                 Branch

 

     carvediloL      2022- No             1{tbl}      Take 1           Un

kimi



     3.125 mg      6-23 08-24                          tablet by           ity o

f



     tablet      00:00: 00:00                          mouth.           [place]



               00   :00                                          Medical



                                                                 Branch

 

     Insulin            Yes       [id number] 60U       Inject 60           K

elsey



     Degludec      6-02                               units into           [facility]

ld



     (Tresiba      00:00:                               the skin           



     FlexTouch)      00                                 every           



     200 UNIT/ML                                         morning           



     subcutaneou                                                        



     s Solution                                                        



     Pen-injecto                                                        



     r                                                           

 

     HYDROcodone            Yes            1{tbl}      Take 1           Un

kimi



     -acetaminop      4-05                               tablet by           ity

 of



     hen 7.5-325      00:00:                               mouth.           [place]

[place]



     mg per      00                                                Medical



     tablet                                                        Branch

 

     ondansetron            Yes            1{tbl}      Take 1           Un

kimi



     4 mg      4-05                               tablet by           ity of



     disintegrat      00:00:                               mouth.           [place]

[place]



     ing tablet      00                                                Medical



                                                                 Branch

 

     HYDROcodone            Yes            1{tbl}      Take 1           Un

kimi



     -acetaminop      4-05                               tablet by           ity

 of



     hen 7.5-325      00:00:                               mouth.           [place]

[place]



     mg per      00                                                Medical



     tablet                                                        Branch

 

     ondansetron            Yes            1{tbl}      Take 1           Un

kimi



     4 mg      4-05                               tablet by           ity of



     disintegrat      00:00:                               mouth.           [place]

[place]



     ing tablet      00                                                Medical



                                                                 Branch

 

     HYDROcodone            Yes            1{tbl}      Take 1           Un

kimi



     -acetaminop      4-05                               tablet by           ity

 of



     hen 7.5-325      00:00:                               mouth.           [place]

[place]



     mg per      00                                                Medical



     tablet                                                        Branch

 

     ondansetron            Yes            1{tbl}      Take 1           Un

kimi



     4 mg      4-05                               tablet by           ity of



     disintegrat      00:00:                               mouth.           [place]

[place]



     ing tablet      00                                                Medical



                                                                 Branch

 

     HYDROcodone            Yes            1{tbl}      Take 1           Un

kimi



     -acetaminop      4-05                               tablet by           ity

 of



     hen 7.5-325      00:00:                               mouth.           [place]

[place]



     mg per      00                                                Medical



     tablet                                                        Branch

 

     ondansetron            Yes            1{tbl}      Take 1           Un

kimi



     4 mg      4-05                               tablet by           ity of



     disintegrat      00:00:                               mouth.           [place]

[place]



     ing tablet      00                                                Medical



                                                                 Branch

 

     HYDROcodone            Yes            1{tbl}      Take 1           Un

kimi



     -acetaminop      4-05                               tablet by           ity

 of



     hen 7.5-325      00:00:                               mouth.           [place]

[place]



     mg per      00                                                Medical



     tablet                                                        Branch

 

     ondansetron            Yes            1{tbl}      Take 1           Un

kimi



     4 mg      4-05                               tablet by           ity of



     disintegrat      00:00:                               mouth.           [place]

[place]



     ing tablet      00                                                Medical



                                                                 Branch

 

     HYDROcodone            Yes            1{tbl}      Take 1           Un

kimi



     -acetaminop      4-05                               tablet by           ity

 of



     hen 7.5-325      00:00:                               mouth.           [place]

[place]



     mg per      00                                                Medical



     tablet                                                        Branch

 

     ondansetron            Yes            1{tbl}      Take 1           Un

kimi



     4 mg      4-05                               tablet by           ity of



     disintegrat      00:00:                               mouth.           [place]

[place]



     ing tablet      00                                                Medical



                                                                 Branch

 

     HYDROcodone            Yes       [id number] 1{tbl}      Take 1         

  [facility]



     -Acetaminop      4-05                               tablet by           S[facility]

bold



     hen 7.5-325      00:00:                               mouth as           



     MG oral      00                                 needed           



     Tablet                                                        

 

     Ondansetron            Yes       [id number] 1{tbl}      Take 1         

  [facility]



     (ZOFRAN) 4      4-05                               tablet by           [facility]

old



     MG oral      00:00:                               mouth           



     TABLET      00                                 daily           



     DISPERSIBLE                                                        

 

     Insulin      2020      Yes       [id number] 20U       Inject 20           K

elsey



     Aspart 100      1-03                               units into           Sey

bold



     UNIT/ML      00:00:                               the skin 3           



     subcutaneou      00                                 times           



     s Solution                                         daily           



     Pen-injecto                                         (before           



     r                                            meals)           

 

     Atorvastati      2020      Yes       [id number] 20mg      Take 20 mg    

       [facility]



     n Calcium      1-03                2              by mouth           [facility]

d



     20 MG oral      00:00:                                              



     Tablet      00                                                

 

     losartan-hy            Yes                                     Univer

s



     drochloroth      2-24                                              ity of



     iazide      00:00:                                              [place]



     50-12.5 mg      00                                                Medical



     per tablet                                                        Branch

 

     losartan-hy      2020-0      Yes                                     Univer

s



     drochloroth      2-24                                              ity of



     iazide      00:00:                                              [place]



     50-12.5 mg      00                                                Medical



     per tablet                                                        Branch

 

     losartan-hy      2020-0      Yes                                     Univer

s



     drochloroth      2-24                                              ity of



     iazide      00:00:                                              [place]



     50-12.5 mg      00                                                Medical



     per tablet                                                        Branch

 

     losartan-hy      2020-0      Yes                                     Univer

s



     drochloroth      2-24                                              ity of



     iazide      00:00:                                              [place]



     50-12.5 mg      00                                                Medical



     per tablet                                                        Branch

 

     losartan-hy      2020-0      Yes                                     Univer

s



     drochloroth      2-24                                              ity of



     iazide      00:00:                                              [place]



     50-12.5 mg      00                                                Medical



     per tablet                                                        Branch

 

     losartan-hy      2020-0      Yes                                     Univer

s



     drochloroth      2-24                                              ity of



     iazide      00:00:                                              [place]



     50-12.5 mg      00                                                Medical



     per tablet                                                        Branch

 

     potassium      2019      Yes                                     [facility]



     chloride      0-31                                              ity of



     (KLOR-CON      00:00:                                              [place]



     10) 10 mEq      00                                                Medical



     CR tablet                                                        Branch

 

     potassium      2019      Yes                                     [facility]



     chloride      0-31                                              ity of



     (KLOR-CON      00:00:                                              [place]



     10) 10 mEq      00                                                Medical



     CR tablet                                                        Branch

 

     potassium      2019      Yes                                     [facility]



     chloride      0-31                                              ity of



     (KLOR-CON      00:00:                                              [place]



     10) 10 mEq      00                                                Medical



     CR tablet                                                        Branch

 

     potassium      2019      Yes                                     [facility]



     chloride      0-31                                              ity of



     (KLOR-CON      00:00:                                              [place]



     10) 10 mEq      00                                                Medical



     CR tablet                                                        Branch

 

     potassium      2019      Yes                                     [facility]



     chloride      0-31                                              ity of



     (KLOR-CON      00:00:                                              [place]



     10) 10 mEq      00                                                Medical



     CR tablet                                                        Branch

 

     potassium      -      Yes                                     [facility]



     chloride      0-31                                              ity of



     (KLOR-CON      00:00:                                              [place]



     10) 10 mEq      00                                                Medical



     CR tablet                                                        Branch

 

     Insulin            Yes       [id number]           Use as           Univ

ers



     Tower,      7                               directed.           ity of



     Disposable,      00:00:                               4 times           [facility]

as



     (MIHIR PEN      00                                 daily. E           Med[facility]

l



     NEEDLE) 32                                         11.40           Branch



     gauge x                                                        



     5/32" Ndle                                                        

 

     Insulin            Yes       [id number]           Use as           Univ

ers



     Tower,      7                               directed.           ity of



     Disposable,      00:00:                               4 times           [facility]

as



     (MIHIR PEN      00                                 daily. E           Med[facility]

l



     NEEDLE) 32                                         11.40           Branch



     gauge x                                                        



     5/32" Ndle                                                        

 

     Insulin      2018-0      Yes       [id number]           Use as           Univ

ers



     Tower,                                     directed.           ity of



     Disposable,      00:00:                               4 times           [facility]

as



     (MIHIR PEN      00                                 daily. E           [facility]

l



     NEEDLE) 32                                         11.40           Branch



     gauge x                                                        



     5/32" Ndle                                                        

 

     Insulin      -0      Yes       [id number]           Use as           Univ

ers



     Tower,                                     directed.           ity of



     Disposable,      00:00:                               4 times           [facility]

as



     (MIHIR PEN      00                                 daily. E           [facility]

l



     NEEDLE) 32                                         11.40           Branch



     gauge x                                                        



     5/32" Ndle                                                        

 

     Insulin      -0      Yes       [id number]           Use as           Univ

ers



     Tower,                                     directed.           ity of



     Disposable,      00:00:                               4 times           [facility]

as



     (MIHIR PEN      00                                 daily. E           [facility]

l



     NEEDLE) 32                                         11.40           Branch



     gauge x                                                        



     5/32" Ndle                                                        

 

     Insulin      -0      Yes       [id number]           Use as           Univ

ers



     Tower,                                     directed.           ity of



     Disposable,      00:00:                               4 times           [facility]

as



     (MIHIR PEN      00                                 daily. E           [facility]

l



     NEEDLE) 32                                         11.40           Branch



     gauge x                                                        



     5/32" Ndle                                                        

 

     amitriptyli      -0      Yes                      TAKE 1           Univ

ers



     ne 25 mg      6-28                               TABLET BY           ity of



     tablet      00:00:                               MOUTH           [place]



               00                                 TWICE A           Medical



                                                  DAY            Branch

 

     amitriptyli      0      Yes                      TAKE 1           Univ

ers



     ne 25 mg      6-28                               TABLET BY           ity of



     tablet      00:00:                               MOUTH           [place]



               00                                 TWICE A           Medical



                                                  DAY            Branch

 

     amitriptyli      -0      Yes                      TAKE 1           Univ

ers



     ne 25 mg      6-28                               TABLET BY           ity of



     tablet      00:00:                               MOUTH           [place]



               00                                 TWICE A           Medical



                                                  DAY            Branch

 

     amitriptyli      -0      Yes                      TAKE 1           Univ

ers



     ne 25 mg      6-28                               TABLET BY           ity of



     tablet      00:00:                               MOUTH           [place]



               00                                 TWICE A           Medical



                                                  DAY            Branch

 

     amitriptyli      20180      Yes                      TAKE 1           Univ

ers



     ne 25 mg      6-28                               TABLET BY           ity of



     tablet      00:00:                               MOUTH           [place]



               00                                 TWICE A           Medical



                                                  DAY            Branch

 

     amitriptyli      -0      Yes                      TAKE 1           Univ

ers



     ne 25 mg      6-28                               TABLET BY           ity of



     tablet      00:00:                               MOUTH           [place]



               00                                 TWICE A           Medical



                                                  DAY            Branch

 

     amLODIPine      0      Yes            10mg      Take 10 mg           U

nivers



     10 mg      6-24                               by mouth           ity of



     tablet      00:00:                               daily.           [place]



               00                                                Medical



                                                                 Branch

 

     amLODIPine      -0      Yes            10mg      Take 10 mg           U

nivers



     10 mg      6-24                               by mouth           ity of



     tablet      00:00:                               daily.           [place]



               00                                                [facility]

 

     amLODIPine            Yes            10mg      Take 10 mg           U

nivers



     10 mg      6-24                               by mouth           ity of



     tablet      00:00:                               daily.           [place]



               00                                                [facility]

 

     amLODIPine       2022- No             10mg      Take 10 mg           

[facility]



     10 mg      6-24 08-24                          by mouth           ity of



     tablet      00:00: 00:00                          daily.           [place]



               00   :00                                          [facility]

 

     amLODIPine      2022- No             10mg      Take 10 mg           

[facility]



     10 mg      6-24 08-24                          by mouth           ity of



     tablet      00:00: 00:00                          daily.           [place]



               00   :00                                          [facility]







Immunizations







           Ordered    Filled Immunization Date       Status     Comments   [facility]

e



           Immunization Name Name                                        

 

           SARS-COV-2 COVID-19            2021 Completed             Unive

rsity of



           PFIZER VACCINE            00:00:00                         [place] [facility]

 

           SARS-COV-2 COVID-19            2021 Completed             Unive

rsity of



           PFIZER VACCINE            00:00:00                         [place] [facility]

 

           SARS-COV-2 COVID-19            2021 Completed             Unive

rsity of



           PFIZER VACCINE            00:00:00                         [place] [facility]

 

           SARS-COV-2 COVID-19            2021 Completed             Unive

rsity of



           PFIZER VACCINE            00:00:00                         [place] [facility]

 

           SARS-COV-2 COVID-19            2021 Completed             Unive

rsity of



           PFIZER VACCINE            00:00:00                         [place] [facility]

 

           SARS-COV-2 COVID-19            2021 Completed             Unive

rsity of



           PFIZER VACCINE            00:00:00                         [place] [facility]

 

           Covid-19 Vaccine            2021 Completed             [facility]



           (Pfizer), Mrna-lnp,            00:00:00                         



           Jemal Protein, Pf,                                             



           30mcg/0.3ml,IM                                             

 

           Covid-19 Vaccine            2021 Completed             [facility]



           (Pfizer), Mrna-lnp,            00:00:00                         



           Jemal Protein, Pf,                                             



           30mcg/0.3ml,IM                                             

 

           Influenza Virus            2020 Completed             [facility]

y of



           Vaccine Quad .5 mL            00:00:00                         [facility] [place] [facility]



           IM 6+ MO                                               Branch

 

           Influenza Virus            2020 Completed             [facility]

y of



           Vaccine Quad .5 mL            00:00:00                         [facility] [place] [facility]



           IM 6+ MO                                               Branch

 

           Influenza Virus            2020 Completed             Univers[facility]

y of



           Vaccine Quad .5 mL            00:00:00                         [place] 

[facility]



           IM 6+ MO                                               Branch

 

           Influenza Virus            2020 Completed             [facility]

y of



           Vaccine Quad .5 mL            00:00:00                         [place] 

[facility]



           IM 6+ MO                                               Branch

 

           Influenza Virus            2020 Completed             Univers[facility]

y of



           Vaccine Quad .5 mL            00:00:00                         [place] 

[facility]



           IM 6+ MO                                               Branch

 

           Influenza Virus            2020 Completed             Universit

y of



           Vaccine Quad .5 mL            00:00:00                         [facility]



           IM 6+ MO                                               Branch

 

           Influenza Virus            2020 Completed             Abeba Olsen

ybold



           Vaccine, No            00:00:00                         



           Preserv, age 6                                             



           months and up                                             

 

           Td- Tetanus &            2015 Completed             Abeba Dockery

old



           Diphtheria Vaccine            00:00:00                         



           (age 7+ years)                                             

 

           Td- Tetanus &            2015 Completed             Abeba Olsensuyapa

old



           Diphtheria Vaccine            00:00:00                         



           (age 7+ years)                                             







Vital Signs







             Vital Name   Observation Time Observation Value Comments     Source

 

             Systolic blood 2022 18:10:00 109 mm[Hg]                Univer

sity of



             [email or web address]

 

             Diastolic blood 2022 18:10:00 73 mm[Hg]                 Unive

rsity of



             [facility] [[email or web address]

 

             Heart rate   2022 18:00:00 74 /min                   [facility]

 

             Body weight  2022 18:00:00 100.018 kg                [facility]

 

             BMI          2022 18:00:00 37.85 kg/m2               [facility]

 

             Oxygen saturation in 2022 18:00:00 97 /min                   

[facility]



             Arterial blood by                                        [place] [facility]

erna



             Pulse oximetry                                        Branch

 

             Systolic blood 2022 18:52:00 117 mm[Hg]                Univer

sity of



             [facility] [[email or web address]

 

             Diastolic blood 2022 18:52:00 74 mm[Hg]                 Unive

rsity of



             [facility] [[email or web address]

 

             Heart rate   2022 18:52:00 106 /min                  Universi

[facility]

 

             Body weight  2022 18:52:00 100.064 kg                Universi

[facility]

 

             BMI          2022 18:52:00 37.87 kg/m2               [facility]

 

             Oxygen saturation in 2022 18:52:00 92 /min                   

University 



             Arterial blood by                                        [place] [facility]

erna



             Pulse oximetry                                        Branch

 

             Systolic blood 2022 19:59:00 127 mm[Hg]                Abeba

 Seybold



             pressure                                            

 

             Diastolic blood 2022 19:59:00 86 mm[Hg]                 Kelse

y Seybold



             pressure                                            

 

             Heart rate   2022 19:59:00 94 /min                   Abeba linbobeto

 

             Body temperature 2022 19:59:00 36.39 Emely                 Josiane

ey Seybold

 

             Respiratory rate 2022 19:59:00 16 /min                   Josiane

ey ybold

 

             Body height  2022 19:59:00 160 cm                    Abeba S

deliabobeto

 

             Body weight  2022 19:59:00 106.867 kg                Abeba linbobeto

 

             BMI          2022 19:59:00 41.73 kg/m2               Abeba abbott







Procedures







                Procedure       Date / Time Performed Performing Clinician Sour

e

 

                POCT HEMOGLOBIN A1C 2022 18:12:00 [facility]

 

                POCT HEMOGLOBIN A1C 2022 19:04:00 [facility]







Plan of Care







             Planned Activity Planned Date Details      Comments     Source

 

             Future Scheduled 2022   INFLUENZA VACCINE (#1)              C

[facility]



             Test         00:00:00     [code = INFLUENZA              Medical Ce

nter



                                       VACCINE (#1)]              

 

             Future Scheduled 2022   DEPRESSION SCREENING              [facility]



             Test         00:00:00     (12+) [code =              [facility]



                                       DEPRESSION SCREENING              



                                       (12+)]                    

 

             Future Scheduled 2022   DEPRESSION SCREENING              [facility]



             Test         00:00:00     (12+) [code =              [facility]



                                       DEPRESSION SCREENING              



                                       (12+)]                    

 

             Future Scheduled 2021   INFLUENZA VACCINE (#1)              C

[facility]



             Test         00:00:00     [code = INFLUENZA              Medical Ce

nter



                                       VACCINE (#1)]              

 

             Future Scheduled 2007   SHINGLES VACCINES (1 of              

[facility]



             Test         00:00:00     2) [code = SHINGLES              [facility] 

Center



                                       VACCINES (1 of 2)]              

 

             Future Scheduled 2007   SHINGLES VACCINES (1 of              

[facility]



             Test         00:00:00     2) [code = SHINGLES              Medical 

Center



                                       VACCINES (1 of 2)]              

 

             Future Scheduled 2002   Lipid panel (procedure)              

[facility]



             Test         00:00:00     [code = 76991149]              Medical Ce

nter

 

             Future Scheduled 2002   Lipid panel (procedure)              

[facility]



             Test         00:00:00     [code = 96661545]              Medical Ce

nter

 

             Future Scheduled 1978   Screening for malignant              

[facility]



             Test         00:00:00     neoplasm of cervix              Medical C

enter



                                       (procedure) [code =              



                                       456826757]                

 

             Future Scheduled 1978   Screening for malignant              

[facility]



             Test         00:00:00     neoplasm of cervix              Medical C

enter



                                       (procedure) [code =              



                                       509587468]                

 

             Future Scheduled 1976   DTAP/TDAP/TD VACCINES              [facility]

[facility]



             Test         00:00:00     (1 - Tdap) [code =              Medical C

enter



                                       DTAP/TDAP/TD VACCINES              



                                       (1 - Tdap)]               

 

             Future Scheduled 1976   DTAP/TDAP/TD VACCINES              [facility]

[facility]



             Test         00:00:00     (1 - Tdap) [code =              Medical C

enter



                                       DTAP/TDAP/TD VACCINES              



                                       (1 - Tdap)]               

 

             Future Scheduled 1975   HEPATITIS C SCREENING              [facility]

[facility]



             Test         00:00:00     [code = HEPATITIS C              Medical 

Center



                                       SCREENING]                

 

             Future Scheduled 1975   HEPATITIS C SCREENING              [facility]

[facility]



             Test         00:00:00     [code = HEPATITIS C              Medical 

Center



                                       SCREENING]                

 

             Future Scheduled 1962   COVID-19 VACCINE (1)              [facility]



             Test         00:00:00     [code = COVID-19              Medical [facility]

ter



                                       VACCINE (1)]              

 

             Future Scheduled 1958   COVID-19 VACCINE (#1)              [facility]

[facility]



             Test         00:00:00     [code = COVID-19              Medical [place]

ter



                                       VACCINE (#1)]              

 

             Future Scheduled 1957   Sigmoidoscopy [code =              [facility]



             Test         00:00:00     Sigmoidoscopy]              Medical Cente

r

 

             Future Scheduled 1957   Screening for malignant              

[facility]



             Test         00:00:00     neoplasm of breast              Medical C

enter



                                       (procedure) [code =              



                                       818580456]                

 

             Future Scheduled 1957   CT Colonography (combo)              

[facility]



             Test         00:00:00     [code = CT Colonography              [facility]



                                       (combo)]                  

 

             Future Scheduled 1957   Screening for malignant              

[facility]



             Test         00:00:00     neoplasm of colon              Medical Ce

nter



                                       (procedure) [code =              



                                       280914747]                

 

             Future Scheduled 1957   Screening for malignant              

[facility]



             Test         00:00:00     neoplasm of colon              Medical Ce

nter



                                       (procedure) [code =              



                                       618420026]                

 

             Future Scheduled 1957   Screening for malignant              

[facility]



             Test         00:00:00     neoplasm of colon              Medical Ce

nter



                                       (procedure) [code =              



                                       417803316]                

 

             Future Scheduled 1957   Screening for malignant              

[facility]



             Test         00:00:00     neoplasm of colon              Medical Ce

nter



                                       (procedure) [code =              



                                       207567273]                

 

             Future Scheduled 1957   Screening for malignant              

[facility]



             Test         00:00:00     neoplasm of breast              Medical C

enter



                                       (procedure) [code =              



                                       243899558]                

 

             Future Scheduled 1957   Screening for malignant              

[facility]



             Test         00:00:00     neoplasm of colon              Medical Ce

nter



                                       (procedure) [code =              



                                       195664884]                







Encounters







        Start   End     Encounter Admission Attending Care    Care    Encounter 

Source



        Date/Time Date/Time Type    Type    Clinicians Facility Department ID   

   

 

        2022         Outpatient 3       ALECNOMI   DIANE     [id number]

 [facility]



        13:41:13                         [place]                 0110    



                                                                        Health



                                                                        Rehabil



                                                                        itation



                                                                        [place]



                                                                        d

 

        2022         Outpatient 3       ALECNOMI   DIANE     [id number]

 [facility]



        13:40:23                         [place]                 0107    



                                                                        Health



                                                                        Rehabil



                                                                        itation



                                                                        [place]



                                                                        d

 

        2022         Outpatient 3       [id number]  [facility]   REF     [id number]

 [facility]



        13:39:49                                                 0106    



                                                                        Health



                                                                        Rehabil



                                                                        itation



                                                                        [place]



                                                                        d

 

        2021         [facility]   [id number] C

[facility]



        00:00:00         Encounter         Shanita TURK                         [facility]

 

        2021         [facility]   [id number] C

[facility]



        00:00:00         Encounter         Shanita Solis

[facility]

 

        2023 Outpatient JOSSE VIERA    [facility]    [id number]

-20 [facility]



        11:30:00 11:30:00                 AND[facility]Y                 [id number]  [facility]

 

        2022 Telephone         MaximilianoRUST    [id number]

[address] Univers



        00:00:00 00:00:00                 [facility]  350.1.13.10         it

y of



                                                [place] [id number]         Lorenzo

as



                                                AILIN?BLEA [phone]         [address]                 



                                                OFFICE                  



                                                [facility]                 

 

        [facility]    [id number]

[facility] Univers



        13:00:00 13:42:53                 [facility]

 

        2022 Office          VieraRUST    [id number]

82 Univers



        13:00:00 13:42:53 Visit           [place] [address]..13.10         it

y of



                                                [place] [id number]         Lorenzo

as



                                                AILIN?BLEA [phone]         [address]                 



                                                OFFICE                  



                                                [facility]                 

 

        2022 [facility] R       [facility]    [id number]

-20 Univers



        13:00:00 13:00:00                 [facility]                 [id number]  [facility]

 

        2022 Outpatient         ABEBA ULLOA  [id number]

425 [place]



        00:00:00 00:00:00                 ASHLEY wang

 

        2022 Refill          MaximilianoRUST    [id number]

56 Univers



        00:00:00 00:00:00                 [facility]  350..13.10         it

y of



                                                [place] [id number]         Lorenzo

as



                                                AILIN?BLEA [phone]         [address]                 



                                                OFFICE                  



                                                [facility]                 

 

        2022 Office          VieraRUST    [id number]

98 Univers



        14:00:00 14:38:25 Visit           [facility]  350.1.13.10         it

y of



                                                [place] [id number]         Lorenzo

as



                                                AILIN?BLEA [phone]         [address]                 



                                                OFFICE                  



                                                [facility]                 

 

        2022 Outpatient R       VIERA,    [facility]    [id number]

741 Univers



        14:00:00 14:38:25                 [facility]

 

        2022 Outpatient         ABEBA ULLOA  [id number]

639 [place]



        14:00:00 14:00:00                 ASHLEY                           Seybol

d

 

        2022 Outpatient         ABEBA ULLOA  [id number]

206 [place]



        00:00:00 00:00:00                 ASHLEY                           Seybol

d

 

        2022 Outpatient         ABEBA ULLOA  [id number]

043 [place]



        15:00:00 15:00:00                 ASHLEY                           Seybol

d

 

        2022 Outpatient         LAB90   ABEBA HELM  [id number]

69 [place]



        14:50:00 14:50:00                                                 Seybol

d

 

        2022 Office          Wally Ulloa    [id number]

6942 [place]



        14:00:00 14:15:00 Visit           Ashley Ott 350.1.13.13         Se

suyapashae Carnes         [id number]         



                                                        [phone]         



                                                        0               

 

        2022 Outpatient         ABEBA ULLOA  [id number]

374 [place]



        08:45:00 08:45:00                 ASHLEY                           Seybol

d

 

        2022 Outpatient         ABEBA ULLOA  [id number]

584 [place]



        00:00:00 00:00:00                 ASHLEY                           Seybol

d

 

        2022 Outpatient         ABEBA ULLOA  [id number]

360 [place]



        00:00:00 00:00:00                 ASHLEY                           Seybol

d

 

        2021 Outpatient         ABEBA STANFORD  [id number]

65 [place]



        14:00:00 14:00:00                 JOSE ALFREDO                         Seybol

d

 

        2021 Outpatient         ABEBA ULLOA  [id number]

605 [place]



        13:30:00 13:30:00                 ASHLEY                           Seybol

d

 

        2021 Outpatient         ABEBA BEARD  [id number]

37 [place]



        00:00:00 00:00:00                 ALFREDO kaufman

 

        2020 Office          Maximiliano    [facility]    [id number]

30 



        09:53:21 10:52:57 Visit           Andrey Topete 350.1.13.10         



                                                Petros [id number]         



                                                [facility] [phone]         



                                                [facility]     220             



                                                Building                 







Results







           Test Description Test Time  Test Comments Results    Result Comments 

Source









                    POCT HEMOGLOBIN A1C TEST 2022 18:13:00 









                      Test Item  Value      Reference Range Interpretation Comme

nts









             POCT HBA1C (test code = 4548-4) 10.7 %       4-6          A        

    

 

             Lab Interpretation (test code = 79844-7) Abnormal                  

             



[facility] HEMOGLOBIN A1C [id number] 18:13:00





             Test Item    Value        Reference Range Interpretation Comments

 

             POCT HBA1C (test code = 4548-4) 10.7 %       4-6          A        

    

 

             Lab Interpretation (test code = Abnormal                           

    



             33560-6)                                            



[facility] HEMOGLOBIN A1C [id number] 19:08:00





             Test Item    Value        Reference Range Interpretation Comments

 

             POCT HBA1C (test code = 4548-4) 5.8 %        4-6                   

    



[facility] HEMOGLOBIN A1C [id number] 19:08:00





             Test Item    Value        Reference Range Interpretation Comments

 

             POCT HBA1C (test code = 4548-4) 5.8 %        4-6                   

    



[facility] No

## 2022-09-27 NOTE — RAD REPORT
EXAM DESCRIPTION:  [place] Single View9/27/2022 4:07 pm

 

CLINICAL HISTORY:  Abdominal pain

 

COMPARISON:  January 2022

 

FINDINGS:   The lungs appear clear of acute infiltrate. The heart is normal size

 

IMPRESSION:   No acute abnormalities displayed

## 2022-09-27 NOTE — EDPHYS
Physician Documentation                                                                           

 [facility]                                                                 

Name: Josette Faye                                                                                 

Age: 66 yrs                                                                                       

Sex: Female                                                                                       

: 1955                                                                                   

MRN: [id number]                                                                                   

Arrival Date: 2022                                                                          

Time: 14:15                                                                                       

Account#: [id number]                                                                            

Bed 20                                                                                            

Private MD:                                                                                       

ED Physician Raymond Philip                                                                      

HPI:                                                                                              

                                                                                             

15:21 This 66 yrs old Female presents to ER via EMS with complaints of near syncope.          pm1 

15:21 The patient has experienced near-syncope, felt faint. Onset: The symptoms/episode       pm1 

      began/occurred just prior to arrival, today. Duration: This was a single episode.           

      Context: occurred physical therapy, occurred while the patient was exercising, Just         

      prior to the episode the patient experienced no apparent symptoms. Associated injury:       

      The patient did not suffer any apparent associated injury. Associated signs and             

      symptoms: Pertinent positives: dizziness, Pertinent negatives: abdominal pain, chest        

      pain, headache. Current symptoms: Currently, the patient is not experiencing any            

      symptoms. The patient has experienced similar episodes in the past, multiple times. The     

      patient has not recently seen a physician. Patient reports history of poor PO fluid         

      intake resulting in multiple similar presentations of hypotension.                          

                                                                                                  

Historical:                                                                                       

- PMHx:                                                                                           

14:26 Diabetes - IDDM; Hypertension;                                                          jh6 

                                                                                                  

- Immunization history:: Adult Immunizations up to date.                                          

- Social history:: Smoking status: Patient denies any tobacco usage or history of.                

                                                                                                  

                                                                                                  

ROS:                                                                                              

15:21 Constitutional: Negative for fever, chills, and weight loss, Cardiovascular: Negative   pm1 

      for chest pain, palpitations, and edema, Respiratory: Negative for shortness of breath,     

      cough, wheezing, and pleuritic chest pain, Abdomen/GI: Negative for abdominal pain,         

      nausea, vomiting, diarrhea, and constipation, MS/Extremity: Negative for injury and         

      deformity, Skin: Negative for injury, rash, and discoloration.                              

15:21 Neuro: Positive for dizziness, Negative for headache, numbness, tingling, weakness.         

15:21 All other systems are negative.                                                             

                                                                                                  

Exam:                                                                                             

15:21 Abdomen/GI: Exam negative for acute changes, Inspection: abdomen appears normal,        pm1 

      Palpation: abdomen is soft and non-tender, in all quadrants.                                

15:21 Constitutional:  This is a well developed, well nourished patient who is awake, alert,      

      and in no acute distress. Head/Face:  Normocephalic, atraumatic.                            

15:21 Skin:  Warm, dry with normal turgor.  Normal color with no rashes, no lesions, and no       

      evidence of cellulitis. MS/ Extremity:  Pulses equal, no cyanosis.  Neurovascular           

      intact.  Full, normal range of motion.                                                      

15:21 Eyes: Exam is negative for acute changes, Periorbital structures: appear normal,            

      Pupils: no acute changes, Extraocular movements: no acute changes, Conjunctiva: no          

      acute changes, no injection.                                                                

15:21 ENT: Exam is negative for acute changes, Mouth: no acute changes, Lips: normal, moist,      

      Oral mucosa: normal, pink and intact, moist.                                                

15:21 Cardiovascular: Exam negative for  acute changes, Rate: normal, Rhythm: regular,            

      Pulses: no pulse deficits are appreciated, Heart sounds: normal, normal S1and S2,           

      Edema: is not appreciated.                                                                  

15:21 Respiratory: Exam negative for  acute changes, respiratory distress, shortness of           

      breath, Breath sounds: are clear throughout.                                                

15:21 Back: Exam negative for acute changes, pain, is absent, ROM is normal, normal spinal        

      alignment noted.                                                                            

15:21 Neuro: Exam negative for acute changes, Orientation: is normal, Mentation: is normal,       

      Motor: is normal, moves all fours.                                                          

                                                                                                  

Vital Signs:                                                                                      

14:23  / 83; Pulse 70; Resp 17; Temp 97.8(O); Pulse Ox 95% ; Weight 106.59 kg; Height 5 jh6 

      ft. 5 in. (165.10 cm); Pain 0/10;                                                           

17:30  / 84 Supine; Pulse 67; Resp 17;                                                  jh6 

17:32  / 90 Sitting; Pulse 74; Resp 17; Pulse Ox 100% ;                                 jh6 

17:35  / 80; Pulse 79; Resp 17; Pulse Ox 100% ;                                         jh6 

18:27  / 73; Pulse 64; Resp 17; Temp 97.8(O); Pulse Ox 100% ; Pain 0/10;                jh6 

19:55  / 97; Pulse 64; Resp 16; Pulse Ox 98% on R/A; Pain 0/10;                         kl  

14:23 Body Mass Index 39.11 (106.59 kg, 165.10 cm)                                            6 

                                                                                                  

MDM:                                                                                              

14:41 Patient medically screened.                                                             [facility] 

18:05 ED course: Patient does not want to stay in the hospital when I told her I would like   pm1 

      to admit her. Therefore I requested that she at least get a repeat troponin prior to        

      going home and she agreed to it.                                                            

19:44 Data reviewed: vital signs. Data interpreted: Pulse oximetry: on room air is 100 %.     pm1 

      Interpretation: normal.                                                                     

19:44 Counseling: I had a detailed discussion with the patient and/or guardian regarding: the pm1 

      historical points, exam findings, and any diagnostic results supporting the                 

      discharge/admit diagnosis, lab results, radiology results, the need for outpatient          

      follow up, to return to the emergency department if symptoms worsen or persist or if        

      there are any questions or concerns that arise at home, informed the patient that I         

      would like to keep her due to the elevated troponin and renal disease but despite her       

      decision to go home AMA her serial troponin has decreased. Patient reports that she         

      will call her nephrologist tomorrow for evaluation. Patient's hypotension is due to         

      lack of PO fluids intake and this has been difficult for the patient given her stage 4      

      renal disease.                                                                              

                                                                                                  

                                                                                             

15:21 Order name: Basic Metabolic Panel; Complete Time: 16:51                                 pm                                                                                             

15:21 Order name: CBC with Diff; Complete Time: 16:36                                         pm                                                                                             

15:21 Order name: LFT's; Complete Time: 16:51                                                 pm                                                                                             

15:21 Order name: Magnesium; Complete Time: 16:51                                             pm                                                                                             

15:21 Order name: NT PRO-BNP; Complete Time: 16:51                                            pm                                                                                             

15:21 Order name: PT-INR; Complete Time: 16:36                                                pm                                                                                             

15:21 Order name: Troponin HS; Complete Time: 16:51                                           pm                                                                                             

15:21 Order name: XRAY Chest (1 view); Complete Time: 17:23                                   pm                                                                                             

15:21 Order name: EKG; Complete Time: 15:21                                                   pm                                                                                             

15:21 Order name: Cardiac monitoring; Complete Time: 16:03                                    pm                                                                                             

15:21 Order name: EKG - Nurse/Tech; Complete Time: 16:03                                      pm                                                                                             

15:21 Order name: CT Head Brain wo Cont; Complete Time: 16:36                                 pm                                                                                             

18:03 Order name: Troponin High Sensitivity; Complete Time: 19:04                             pm                                                                                             

15:21 Order name: IV Saline Lock; Complete Time: 16:03                                        pm1 

                                                                                             

15:21 Order name: Labs collected and sent; Complete Time: 16:03                               pm1 

                                                                                             

15:21 Order name: O2 Per Protocol; Complete Time: 16:03                                       pm1 

                                                                                             

15:21 Order name: O2 Sat Monitoring; Complete Time: 16:03                                     pm1 

                                                                                             

17:23 Order name: Orthostatic Blood Pressure; Complete Time: 18:29                            pm1 

                                                                                                  

EC:12 Rate is 70 beats/min. Rhythm is regular, Normal Sinus Rhythm with No ectopy. QRS Axis   pm1 

      is Normal. MO interval is normal. QRS interval is normal. QT interval is normal. No Q       

      waves. Clinical impression: Normal sinus rhythm, Septal infarct, age undetermined, ST \T\   

      T wave abnormality, consider lateral ischemia, Abnormal ECG.                                

                                                                                                  

Administered Medications:                                                                         

17:52 Drug: Aspirin 325 mg Route: PO;                                                         jh6 

20:05 Follow up: Response: No adverse reaction                                                kl  

                                                                                                  

                                                                                                  

Disposition Summary:                                                                              

22 19:52                                                                                    

Discharge Ordered                                                                                 

      Location: Home                                                                          pm1 

      Problem: new                                                                            pm1 

      Symptoms: have improved                                                                 pm1 

      Condition: Undetermined                                                                 pm1 

      Diagnosis                                                                                   

        - Hypotension, unspecified                                                            pm1 

        - Near syncope                                                                        pm1 

      Followup:                                                                               pm1 

        - With: Emergency Department                                                               

        - When: As needed                                                                          

        - Reason: Worsening of condition                                                           

      Followup:                                                                               pm1 

        - With: Private Physician                                                                  

        - When: 2 - 3 days                                                                         

        - Reason: Recheck today's complaints, Continuance of care, Re-evaluation by your           

      physician                                                                                   

      Discharge Instructions:                                                                     

        - Discharge Summary Sheet                                                             pm1 

        - Hypotension                                                                         pm1 

        - Near-Syncope                                                                        pm1 

      Forms:                                                                                      

        - Medication Reconciliation Form                                                      pm1 

        - Thank You Letter                                                                    pm1 

        - Antibiotic Education                                                                pm1 

        - Prescription Opioid Use                                                             pm1 

Signatures:                                                                                       

Dispatcher [employer]                           EDRaymond Pedro MD MD cha Marinas, Patrick, NP                    NP   pm1                                                  

Rivka Cleaning RN                   RN   jh6                                                  

Robina Saunders RN                                                      

                                                                                                  

**************************************************************************************************

## 2022-09-28 NOTE — EKG
Test Date:    2022-09-27               Test Time:    16:03:16

Technician:   MELISSA                                     

                                                     

MEASUREMENT RESULTS:                                       

Intervals:                                           

Rate:         70                                     

HI:           172                                    

QRSD:         110                                    

QT:           460                                    

QTc:          496                                    

Axis:                                                

P:            78                                     

HI:           172                                    

QRS:          60                                     

T:            149                                    

                                                     

INTERPRETIVE STATEMENTS:                                       

                                                     

Normal sinus rhythm

Septal infarct, age undetermined

ST & T wave abnormality, consider lateral ischemia

Abnormal ECG

Compared to ECG 01/01/2022 03:40:45

ST (T wave) deviation now present

Possible ischemia now present

Ventricular premature complex(es) no longer present

Myocardial infarct finding still present



Electronically Signed On 09-28-22 13:05:03 CDT by Jones Thompson

## 2022-09-30 VITALS — OXYGEN SATURATION: 98 % | SYSTOLIC BLOOD PRESSURE: 134 MMHG | DIASTOLIC BLOOD PRESSURE: 97 MMHG

## 2022-09-30 VITALS — TEMPERATURE: 97.8 F

## 2023-01-17 ENCOUNTER — HOSPITAL ENCOUNTER (INPATIENT)
Dept: HOSPITAL 97 - ER | Age: 68
LOS: 6 days | Discharge: ANOTHER HOSPITAL | DRG: 871 | End: 2023-01-23
Attending: STUDENT IN AN ORGANIZED HEALTH CARE EDUCATION/TRAINING PROGRAM | Admitting: STUDENT IN AN ORGANIZED HEALTH CARE EDUCATION/TRAINING PROGRAM
Payer: SELF-PAY

## 2023-01-17 VITALS — BODY MASS INDEX: 34.4 KG/M2

## 2023-01-17 DIAGNOSIS — E87.0: ICD-10-CM

## 2023-01-17 DIAGNOSIS — I50.33: ICD-10-CM

## 2023-01-17 DIAGNOSIS — N18.4: ICD-10-CM

## 2023-01-17 DIAGNOSIS — Z78.1: ICD-10-CM

## 2023-01-17 DIAGNOSIS — Z90.49: ICD-10-CM

## 2023-01-17 DIAGNOSIS — Z98.84: ICD-10-CM

## 2023-01-17 DIAGNOSIS — E11.22: ICD-10-CM

## 2023-01-17 DIAGNOSIS — A41.51: Primary | ICD-10-CM

## 2023-01-17 DIAGNOSIS — L89.312: ICD-10-CM

## 2023-01-17 DIAGNOSIS — N39.0: ICD-10-CM

## 2023-01-17 DIAGNOSIS — R65.21: ICD-10-CM

## 2023-01-17 DIAGNOSIS — E11.10: ICD-10-CM

## 2023-01-17 DIAGNOSIS — Z79.4: ICD-10-CM

## 2023-01-17 DIAGNOSIS — I13.0: ICD-10-CM

## 2023-01-17 DIAGNOSIS — N17.0: ICD-10-CM

## 2023-01-17 DIAGNOSIS — Z79.52: ICD-10-CM

## 2023-01-17 DIAGNOSIS — Z60.2: ICD-10-CM

## 2023-01-17 DIAGNOSIS — L89.322: ICD-10-CM

## 2023-01-17 DIAGNOSIS — I21.4: ICD-10-CM

## 2023-01-17 DIAGNOSIS — E87.5: ICD-10-CM

## 2023-01-17 DIAGNOSIS — Z79.899: ICD-10-CM

## 2023-01-17 DIAGNOSIS — G92.8: ICD-10-CM

## 2023-01-17 DIAGNOSIS — Z20.822: ICD-10-CM

## 2023-01-17 DIAGNOSIS — D63.1: ICD-10-CM

## 2023-01-17 LAB
ALBUMIN SERPL BCP-MCNC: 3.6 G/DL (ref 3.4–5)
ALP SERPL-CCNC: 108 U/L (ref 45–117)
ALT SERPL W P-5'-P-CCNC: 18 U/L (ref 13–56)
AST SERPL W P-5'-P-CCNC: 37 U/L (ref 15–37)
BUN BLD-MCNC: 51 MG/DL (ref 7–18)
COHGB MFR BLDA: 1.7 % (ref 0–1.5)
GLUCOSE SERPLBLD-MCNC: 704 MG/DL (ref 74–106)
GLUCOSE SERPLBLD-MCNC: 720 MG/DL (ref 74–106)
HCT VFR BLD CALC: 38.8 % (ref 36–45)
INR BLD: 1.05
LYMPHOCYTES # SPEC AUTO: 0.6 K/UL (ref 0.7–4.9)
MCV RBC: 87.9 FL (ref 80–100)
NT-PROBNP SERPL-MCNC: (no result) PG/ML (ref ?–125)
OXYHGB MFR BLDA: 86.3 % (ref 94–97)
PMV BLD: 9.3 FL (ref 7.6–11.3)
POTASSIUM SERPL-SCNC: 5.8 MMOL/L (ref 3.5–5.1)
RBC # BLD: 4.41 M/UL (ref 3.86–4.86)
SAO2 % BLDA: 88.8 % (ref 92–98.5)
SARS-COV-2 RNA RESP QL NAA+PROBE: NEGATIVE

## 2023-01-17 PROCEDURE — 36415 COLL VENOUS BLD VENIPUNCTURE: CPT

## 2023-01-17 PROCEDURE — 51702 INSERT TEMP BLADDER CATH: CPT

## 2023-01-17 PROCEDURE — 83605 ASSAY OF LACTIC ACID: CPT

## 2023-01-17 PROCEDURE — 87086 URINE CULTURE/COLONY COUNT: CPT

## 2023-01-17 PROCEDURE — 84100 ASSAY OF PHOSPHORUS: CPT

## 2023-01-17 PROCEDURE — 83735 ASSAY OF MAGNESIUM: CPT

## 2023-01-17 PROCEDURE — 96366 THER/PROPH/DIAG IV INF ADDON: CPT

## 2023-01-17 PROCEDURE — 80053 COMPREHEN METABOLIC PANEL: CPT

## 2023-01-17 PROCEDURE — 0240U: CPT

## 2023-01-17 PROCEDURE — 82550 ASSAY OF CK (CPK): CPT

## 2023-01-17 PROCEDURE — 83880 ASSAY OF NATRIURETIC PEPTIDE: CPT

## 2023-01-17 PROCEDURE — 93458 L HRT ARTERY/VENTRICLE ANGIO: CPT

## 2023-01-17 PROCEDURE — 87324 CLOSTRIDIUM AG IA: CPT

## 2023-01-17 PROCEDURE — 87077 CULTURE AEROBIC IDENTIFY: CPT

## 2023-01-17 PROCEDURE — 82805 BLOOD GASES W/O2 SATURATION: CPT

## 2023-01-17 PROCEDURE — 97161 PT EVAL LOW COMPLEX 20 MIN: CPT

## 2023-01-17 PROCEDURE — 70450 CT HEAD/BRAIN W/O DYE: CPT

## 2023-01-17 PROCEDURE — 94640 AIRWAY INHALATION TREATMENT: CPT

## 2023-01-17 PROCEDURE — 87186 SC STD MICRODIL/AGAR DIL: CPT

## 2023-01-17 PROCEDURE — 85730 THROMBOPLASTIN TIME PARTIAL: CPT

## 2023-01-17 PROCEDURE — 87088 URINE BACTERIA CULTURE: CPT

## 2023-01-17 PROCEDURE — 80048 BASIC METABOLIC PNL TOTAL CA: CPT

## 2023-01-17 PROCEDURE — 82947 ASSAY GLUCOSE BLOOD QUANT: CPT

## 2023-01-17 PROCEDURE — 99285 EMERGENCY DEPT VISIT HI MDM: CPT

## 2023-01-17 PROCEDURE — 71250 CT THORAX DX C-: CPT

## 2023-01-17 PROCEDURE — 80061 LIPID PANEL: CPT

## 2023-01-17 PROCEDURE — 96365 THER/PROPH/DIAG IV INF INIT: CPT

## 2023-01-17 PROCEDURE — 96375 TX/PRO/DX INJ NEW DRUG ADDON: CPT

## 2023-01-17 PROCEDURE — 85610 PROTHROMBIN TIME: CPT

## 2023-01-17 PROCEDURE — 81003 URINALYSIS AUTO W/O SCOPE: CPT

## 2023-01-17 PROCEDURE — 76937 US GUIDE VASCULAR ACCESS: CPT

## 2023-01-17 PROCEDURE — 81015 MICROSCOPIC EXAM OF URINE: CPT

## 2023-01-17 PROCEDURE — 93306 TTE W/DOPPLER COMPLETE: CPT

## 2023-01-17 PROCEDURE — 87040 BLOOD CULTURE FOR BACTERIA: CPT

## 2023-01-17 PROCEDURE — 83036 HEMOGLOBIN GLYCOSYLATED A1C: CPT

## 2023-01-17 PROCEDURE — 96361 HYDRATE IV INFUSION ADD-ON: CPT

## 2023-01-17 PROCEDURE — 85025 COMPLETE CBC W/AUTO DIFF WBC: CPT

## 2023-01-17 PROCEDURE — 97530 THERAPEUTIC ACTIVITIES: CPT

## 2023-01-17 PROCEDURE — 71045 X-RAY EXAM CHEST 1 VIEW: CPT

## 2023-01-17 PROCEDURE — 82010 KETONE BODYS QUAN: CPT

## 2023-01-17 PROCEDURE — 84484 ASSAY OF TROPONIN QUANT: CPT

## 2023-01-17 PROCEDURE — 74176 CT ABD & PELVIS W/O CONTRAST: CPT

## 2023-01-17 SDOH — SOCIAL STABILITY - SOCIAL INSECURITY: PROBLEMS RELATED TO LIVING ALONE: Z60.2

## 2023-01-17 NOTE — P.HP
Certification for Inpatient


Patient admitted to: Inpatient


With expected LOS: >2 Midnights


Patient will require the following post-hospital care: None


Practitioner: I am a practitioner with admitting privileges, knowledge of 

patient current condition, hospital course, and medical plan of care.


Services: Services provided to patient in accordance with Admission requirements

found in Title 42 Section 412.3 of the Code of Federal Regulations





Patient History


Date of Service: 01/17/23


Reason for admission: DKA, severe sepsis, UTI


History of Present Illness: 





67-year-old female with history of insulin-dependent diabetes, 

olivopontocerebellar atrophy, CKD 4, CHF, hypertension presents emergency 

department for altered mental status.  Her daughter reports that she lives alone

she was last seen normal on Sunday.  She is typically alert, oriented x4 and 

ambulatory.  Daughter reports that patient has been spending a lot of time with 

her  who is currently at [facility] recovering from a liver transplant for

the last 2 months, daughter is unsure what patient has been eating or if she is 

been taking her medications.  Upon presentation to the emergency department 

patient is confused, agitated screaming "please help me".  Patient was evaluated

in the emergency department her labs demonstrated DKA, hyperkalemia, 

leukocytosis urine was positive for UTI, CT chest abdomen pelvis without 

contrast was performed which revealed pulmonary edema.  CT head negative for 

acute findings.  Patient was started on insulin drip, given IV 

antibioticsRocephin.  She is now resting comfortably on stretcher.  ED provider

wishes to admit for further evaluation and management of DKA, severe sepsis, 

UTI.


Allergies





No Known Drug Allergies Allergy (Verified 03/20/15 17:12)


   Unknown





Home Medications: 








Amlodipine [Norvasc*] 5 mg PO DAILY 01/02/22 


Atorvastatin Calcium [Lipitor*] 20 mg PO BEDTIME 01/02/22 


Furosemide [Lasix] 40 mg PO BIDL 01/02/22 


Gabapentin 300 mg PO BID 01/02/22 


Insulin Aspart [Novolog Flexpen] 20 unit SQ TIDWM 01/02/22 


Insulin Degludec [Tresiba] 60 unit SQ DAILY 01/02/22 


Spironolactone 50 mg PO DAILY 01/02/22 


Atorvastatin Calcium [Lipitor] 40 mg PO BEDTIME  tab 01/07/22 


Calcitrol [Rocaltrol*] 0.5 mcg PO DAILY  cap 01/07/22 


Cholecalciferol (Vitamin D3) [Vitamin D 1000 Iu Tab*] 5,000 unit PO DAILY  tab 

01/07/22 


Na Bicarb Tab [Sodium Bicarb 325 MG Tab*] 650 mg PO BIDWM  tab 01/07/22 


predniSONE [Deltasone*] 10 mg PO BID  tab 01/07/22 








- Past Medical/Surgical History


Diabetic: Yes


-: Hypertension


-: Diabetes mellitus type 2-insulin dependent


-: CKD 4


-: CHF


-: Olivopontocerebellar atrophy


-: stomach stapled


-: gastric bypass


-: tubes tied


-: cholectectomy


Psychosocial/ Personal History: Patient retired, lives with family





- Family History


  ** Father


-: Cancer





  ** Mother


-: Hypertension, Diabetes





  ** Brother


-: Diabetes





  ** Sister


-: Diabetes





- Social History


Alcohol use: No


CD- Drugs: No


Caffeine use: Yes


Place of Residence: Home





Review of Systems


is unable to be obtained





Physical Examination





- Physical Exam


General: Alert, In no apparent distress, Oriented x1, Obese, Other (Agitated)


HEENT: Atraumatic, PERRLA, Mucous membr. moist/pink, EOMI, Sclerae nonicteric


Neck: Supple, 2+ carotid pulse no bruit, No LAD, Without JVD or thyroid 

abnormality


Respiratory: Diminished, Crackles/rales


Cardiovascular: Regular rate/rhythm, Normal S1 S2, Edema


Capillary refill: <2 Seconds


Gastrointestinal: Normal bowel sounds, No tenderness


Musculoskeletal: No tenderness


Integumentary: No rashes


Neurological: Normal speech, Normal strength at 5/5 x4 extr, Normal tone, Normal

 affect





- Studies


Laboratory Data (last 24 hrs)





01/17/23 19:15: PT 11.5, INR 1.05, APTT 31.2


01/17/23 19:15: Sodium 136, Potassium 5.8 H*, BUN 51 H, Creatinine 2.77 H, 

Glucose 720 H*, Total Bilirubin 1.5 H, AST 37, ALT 18, Alkaline Phosphatase 108


01/17/23 19:15: WBC 17.20 H, Hgb 11.6 L, Hct 38.8, Plt Count 410 H








Assessment and Plan





- Plan


Assessment:


Diabetes mellitus type 2insulin-dependent with DKA


Severe sepsis secondary to UTI


Acute metabolic encephalopathy secondary to sepsis/DKA


Acute on chronic diastolic congestive heart failure


CKD 4 with hyperkalemia


History of olivopontocerebellar atrophy








Plan:


Diabetes mellitus type 2insulin-dependent with DKA


Hourly Accu-Chek, every 4 BMP, insulin drip until anion gap closed and patient 

is tolerating p.o.  A1c in the morning.  It is unclear if patient has been 

compliant with her home medications recently.


Severe sepsis secondary to UTI


Blood and urine cultures obtained, SIRS criteria present including leukocytosis,

 tachycardia, tachypnea source of infection confirmed with UTI.  Lactic acid 

3.3.  Patient not hypotensive and lactate less than 4 and indication for 30 

cc/kg IV fluid bolus currently.  Continue Rocephin.


Acute metabolic encephalopathy secondary to sepsis/DKA


Continue as above, olivopontocerebellar atrophy also likely contributing.


Acute on chronic diastolic congestive heart failure


Cardiology consult in place, echocardiogram ordered.  Given IV Lasix tonight, 

will need gentle IV fluids with insulin/for DKA treatment.  Small bilateral 

pleural effusions noted on CT.  Will monitor respiratory status closely, diurese

 as needed.


CKD 4 with hyperkalemia 


Repeat chemistry every 4 hour throughout the evening, anticipate will improve 

with insulin, Lasix.  Renal function similar to baseline.  Nephrology consulted.


History of olivopontocerebellar atrophy


Daughter reports patient alert, oriented x4, ambulatory baseline.  Helps care 

for her  who is currently at [facility] recovering from liver 

transplant.








DVT PPX: Heparin


Code status: Full


Discharge Plan: Home


Plan to discharge in: Greater than 2 days





- Advance Directives


Does patient have a Living Will: No


Does patient have a Durable POA for Healthcare: No





- Code Status/Comfort Care


Code Status Assessed: Yes (Full code)


Critical Care: No


Time Spent Managing Pts Care (In Minutes): 70

## 2023-01-17 NOTE — XMS REPORT
Continuity of Care Document

                           Created on:2023



Patient:MARIN LANGLEY

Sex:Female

:1955

External Reference #:[id number]





Demographics







                          Address                   [address]



                                                    [place] [address]

 

                          Home Phone                [phone]

 

                          Mobile Phone              [phone]

 

                          Email Address             [email or web address]

 

                          Preferred Language        English

 

                          Marital Status            Unknown

 

                          Worship Affiliation     Unknown

 

                          Race                      Unknown

 

                          Additional Race(s)        Unavailable



                                                    White

 

                          Ethnic Group              Unknown









Author







                          Organization              [facility]

t

 

                          Address                   [address]



                                                    [place] [address]

 

                          Phone                     [phone]









Support







                Name            Relationship    Address         Phone

 

                JASIEL BARBER   Life Partner    [address]    [phone]



                                                [place] [address] 

 

                LEIDY GARRETT Daughter        Unavailable     [phone]



                                                [place] [address] 

 

                1               RAY             Unavailable     Unavailable

 

                2               LEIDY           Unavailable     Unavailable

 

                3               Unavailable     Unavailable     Unavailable

 

                Kristy Luther     Child           [address] [phone]



                                                [place] [address] 

 

                Jasiel Barber   Significant Other [address]    [phone]

5



                                                [place] [address] 









Care Team Providers







                    Name                Role                Phone

 

                    Ashley Ulloa MD Primary Care Physician [phone]

 

                    JESICA MICHAEL NATASHA Attending Clinician Unavailable

 

                    [id number]              Attending Clinician Unavailable

 

                    Shanita Bernard MD Attending Clinician [phone]

 

                    IVETTE DASILVA      Attending Clinician Unavailable

 

                    ASHLEY ULLOA Attending Clinician Unavailable

 

                    Andrey Viera MD     Attending Clinician [phone]

 

                    ANDREY VIERA        Attending Clinician Unavailable

 

                    Doctor Unassigned, No Name Attending Clinician Unavailable

 

                    [id number]               Attending Clinician Unavailable

 

                    Ashley Ulloa MD Attending Clinician [phone]

[phone]

 

                    JOSE ALFREDO STANFORD     Attending Clinician Unavailable

 

                    ALFREDO BEARD     Attending Clinician Unavailable

 

                    CHAIM FARAH     Attending Clinician Unavailable

 

                    2, [facility]          Attending Clinician Unavailable

 

                    RADIOLOGY           Attending Clinician Unavailable

 

                    Radiology           Attending Clinician Unavailable

 

                    Alyssia Peterson MD Attending Clinician Unavailable

 

                    JESICA MICHAEL NATASHA Admitting Clinician Unavailable

 

                    [id number]              Admitting Clinician Unavailable

 

                    SHANITA BERNARD  Admitting Clinician Unavailable

 

                    SVEN ELIZABETH       Admitting Clinician Unavailable









Payers







           Payer Name Policy Type Policy Number Effective Date Expiration Date SHANTA gallagher

 

           BCBS       BCTP       [id number]                       

 

           BCBS       2          [id number] 2021 00:00:00            







Problems







       Condition Condition Condition Status Onset  Resolution Last   Treating Co

mments 

Source



       Name   Details Category        Date   Date   Treatment Clinician        



                                                 Date                 

 

       Congestive Congestive Disease Active                              K

[place]



       heart  heart                6                               [facility]



       failure failure               00:00:                             



                                   00                                 

 

       Hyperlipid Hyperlipid Disease Active                              K

[place]



       emia   emia                 6                               [facility]



       associated associated               00:00:                             



       with type with type               00                                 



       2 diabetes 2 diabetes                                                  



       mellitus mellitus                                                  

 

       Chronic Chronic Disease Active                       Overview: Josiane

ey



       diastolic diastolic               2-15                        Formattin S

[place]



       heart  heart                00:00:                      g of this 



       failure failure               00                          note   



                                                               might be 



                                                               different 



                                                               from the 



                                                               original. 



                                                               Formattin 



                                                               g of this 



                                                               note   



                                                               might be 



                                                               different 



                                                               from the 



                                                               original. 



                                                               On     



                                                               spirnolac 



                                                               tone 50 



                                                               mg daily 



                                                               and lasix 



                                                               Counseled 



                                                               on low 



                                                               sodium 



                                                               diet   



                                                               ....Advis 



                                                               ed to  



                                                               resume 



                                                               lasix and 



                                                               spironola 



                                                               ctone  



                                                               Last   



                                                               Assessmen 



                                                               t & Plan: 



                                                               Formattin 



                                                               g of this 



                                                               note   



                                                               might be 



                                                               different 



                                                               from the 



                                                               original. 



                                                               Continue 



                                                               same and 



                                                               monitor 



                                                               weights 



                                                               and vol 



                                                               status 



                                                               Advised 



                                                               close  



                                                               cardiolog 



                                                               y follow 



                                                               up and 



                                                               low    



                                                               sodium 



                                                               diet   



                                                               counseled 

 

       Background Background Disease Active                              K

[place]



       diabetic diabetic               3-06                               [facility]

d



       retinopath retinopath               00:00:                             



       y of right y of right               00                                 



       eye    eye                                                     



       determined determined                                                  



       by     by                                                      



       examinatio examinatio                                                  



       n      n                                                       

 

       RADHA (acute RADHA (acute Disease Active                              U

nivers



       kidney kidney               9-26                               ity of



       injury) injury)               00:00:                             [place]



                                                                    [facility]



                                                                      [facility]

 

       Obesity Obesity Disease Active                              [facility]



       (BMI   (BMI                 9-26                               ity of



       30-39.9) 30-39.9)               00:00:                             [place]



                                                                    [facility]



                                                                      [facility]anch

 

       Thyromegal Thyromegal Disease Active                              U

nivers



       y      y                    6-                               ity of



                                   00::                             [place]



                                                                    [facility]



                                                                      [facility]anch

 

       Callus of Callus of Disease Active                              Uni

vers



       foot   foot                 6-22                               ity of



                                   00:00:                             [place]



                                                                    [facility]



                                                                      [facility]anch

 

       Vitamin D Vitamin D Disease Active                              Uni

vers



       deficiency deficiency               4-08                               it

y of



                                   00:00:                             [place]



                                                                    [facility]



                                                                      [facility]anch

 

       Essential Essential Disease Active                              Uni

vers



       hypertensi hypertensi               4-06                               it

y of



       on, benign on, benign               00:00:                             [facility]

[place]



                                                                    [facility]



                                                                      [facility]

 

       Stage 3b Stage 3b Disease Active                              Unive

rs



       chronic chronic                                              ity of



       kidney kidney               00:00:                             [place]



       disease disease                                                Medical



                                                                      Branch

 

       Dyslipidem Dyslipidem Disease Active                              U

nivers



       ia     ia                                                  ity of



                                   00:00:                             [address]



                                                                      Branch

 

       Essential Essential Disease Active                       Overview: 

[facility]



       hypertensi hypertensi                                       Formattin

 [place]



       on     on                   00:00:                      g of this 



                                   00                          note   



                                                               might be 



                                                               different 



                                                               from the 



                                                               original. 



                                                               Formattin 



                                                               g of this 



                                                               note   



                                                               might be 



                                                               different 



                                                               from the 



                                                               original. 



                                                               On coreg 



                                                               3.125 mg 



                                                               bid and 



                                                               amlodipin 



                                                               e 5 mg 



                                                               daily  



                                                               Last   



                                                               Assessmen 



                                                               t & Plan: 



                                                               Formattin 



                                                               g of this 



                                                               note   



                                                               might be 



                                                               different 



                                                               from the 



                                                               original. 



                                                               Advised 



                                                               to     



                                                               decrease 



                                                               amlodipin 



                                                               e to 5 mg 



                                                               daily  

 

       Hyperlipid Hyperlipid Disease Active                              PREETI henry   emia                                                [place]



                                   00:00:                             



                                   00                                 

 

       Uncontroll Uncontroll Disease Active                       Overview

: [place]



       ed type 2 ed type 2                                       Link S

scar



       diabetes diabetes               00:00:                      g of this 



       with   with                                           note   



       neuropathy neuropathy                                           might be 



                                                               different 



                                                               from the 



                                                               original. 



                                                               Formattin 



                                                               g of this 



                                                               note   



                                                               might be 



                                                               different 



                                                               from the 



                                                               original. 



                                                               On     



                                                               insulin 



                                                               Will   



                                                               updated 



                                                               [id number] 



                                                               Assessmen 



                                                               t & Plan: 



                                                               Formattin 



                                                               g of this 



                                                               note   



                                                               might be 



                                                               different 



                                                               from the 



                                                               original. 



                                                               Will need 



                                                               to check 



                                                               A1C at 



                                                               next   



                                                               visit  







Allergies, Adverse Reactions, Alerts







       Allergy Allergy Status Severity Reaction(s) Onset  Inactive Treating Comm

ents 

Source



       Name   Type                        Date   Date   Clinician        

 

       NO KNOWN Drug   Active                                           [facility]



       ALLERGIE Class                                                   ity of



       S                                                              [facility]







Social History







           Social Habit Start Date Stop Date  Quantity   Comments   Source

 

           Exposure to 2022 Not sure              [facility]



           SARS-CoV-2 00:00:00   12:29:00                         [facility]



           (event)                                                Branch

 

           Alcohol intake 2021 0 /d                  [facility]

 



                      00:00:00   00:00:00                         [facility]

 

           Tobacco use and 2021 Smokeless tobacco            [facility]

[facility]



           exposure   00:00:00   00:00:00   non-user              

 

           Sex Assigned At 1955                       [facility]

kes



           [facility]









                Smoking Status  Start Date      Stop Date       Source

 

                Never smoked tobacco                                 Abeba kaufman







Medications







       Ordered Filled Start  Stop   Current Ordering Indication Dosage Frequency

 Signature

                    Comments            Components          Source



     Medication Medication Date Date Medication? Clinician                (SIG) 

          



     Name Name                                                   

 

     insulin            Yes       [id number] 60U       inject 60           U

nivers



     degludec      8-24                               Units           ity of



     (TRESIBA      00:00:                               under the           [place]

[place]



     FLEXTOUCH      00                                 skin every           Medi

erna



     U-200) 200                                         morning.           Branc

h



     unit/mL (3                                         E11.65           



     mL) InPn                                                        

 

     atorvastati            Yes       [id number] 20mg      Take 1          

 [facility]



     n 20 mg      8-24                               tablet by           ity of



     tablet      00:00:                               mouth at           [place]



               00                                 bedtime.           Medical



                                                                 Branch

 

     insulin            Yes       [id number] 60U       inject 60           U

nivers



     degludec      8-24                               Units           ity of



     (TRESIBA      00:00:                               under the           [place]

s



     FLEXTOUCH      00                                 skin every           Medi

erna



     U-200) 200                                         morning.           Branc

h



     unit/mL (3                                         E11.65           



     mL) InPn                                                        

 

     atorvastati            Yes       [id number] 20mg      Take 1          

 [facility]



     n 20 mg      8-24                               tablet by           ity of



     tablet      00:00:                               mouth at           [place]



               00                                 bedtime.           Medical



                                                                 Branch

 

     insulin            Yes       [id number] 60U       inject 60           U

nivers



     degludec      8-24                               Units           ity of



     (TRESIBA      00:00:                               under the           [place]

s



     FLEXTOUCH      00                                 skin every           Medi

erna



     U-200) 200                                         morning.           Branc

h



     unit/mL (3                                         E11.65           



     mL) InPn                                                        

 

     atorvastati            Yes       [id number] 20mg      Take 1          

 [facility]



     n 20 mg      8-24                               tablet by           ity of



     tablet      00:00:                               mouth at           [place]



               00                                 bedtime.           Medical



                                                                 Branch

 

     carvediloL            Yes                      TAKE BY           [facility] [place]

ers



     6.25 mg      7-21                               MOUTH 1           ity of



     tablet      00:00:                               TABLET IN           [place]



                                                [facility]



                                                  AND IN           [place]



                                                  EVEING           



                                                  TAKE WITH           



                                                  MEALS           

 

     carvediloL            Yes                      TAKE BY           [facility] [place]

ers



     6.25 mg      7-21                               MOUTH 1           ity of



     tablet      00:00:                               TABLET IN           [place]



                                                [facility]



                                                  AND IN           Branch



                                                  EVEING           



                                                  TAKE WITH           



                                                  MEALS           

 

     carvediloL            Yes                      TAKE BY           [facility] [place]

ers



     6.25 mg      7-21                               MOUTH 1           ity of



     tablet      00:00:                               TABLET IN           [place]



               00                                 [facility]



                                                  AND IN           Branch



                                                  EVEING           



                                                  TAKE WITH           



                                                  MEALS           

 

     insulin      0      Yes       [id number] 10U       inject 10           U

nivers



     aspart      4-13                               Units           ity of



     U-100      00:00:                               under the           [place]



     (NOVOLOG      00                                 skin 3           Medical



     FLEXPEN                                         (three)           Branch



     U-100                                         times           



     INSULIN)                                         daily           



     100 unit/mL                                         before           



     (3 mL)                                         meals.           



     injection                                         E11.65           

 

     insulin      0      Yes       [id number] 50U       inject 50           U

nivers



     degludec      4-13                               Units           ity of



     (TRESIBA      00:00:                               under the           [place]

[place]



     FLEXTOUCH      00                                 skin every           Medi

erna



     U-200) 200                                         morning.           Branc

h



     unit/mL (3                                         E11.65           



     mL) InPn                                                        

 

     insulin      0      Yes       [id number] 10U       inject 10           U

nivers



     aspart      4-13                               Units           ity of



     U-100      00:00:                               under the           [place]



     (NOVOLOG      00                                 skin 3           Medical



     FLEXPEN                                         (three)           Branch



     U-100                                         times           



     INSULIN)                                         daily           



     100 unit/mL                                         before           



     (3 mL)                                         meals.           



     injection                                         E11.65           

 

     insulin            Yes       [id number] 50U       inject 50           U

nivers



     degludec      4-13                               Units           ity of



     (TRESIBA      00:00:                               under the           [place]

[place]



     FLEXTOUCH      00                                 skin every           Medi

erna



     U-200) 200                                         morning.           Branc

h



     unit/mL (3                                         E11.65           



     mL) InPn                                                        

 

     insulin            Yes       [id number] 10U       inject 10           U

nivers



     aspart      4-13                               Units           ity of



     U-100      00:00:                               under the           [place]



     (NOVOLOG      00                                 skin 3           Medical



     FLEXPEN                                         (three)           Branch



     U-100                                         times           



     INSULIN)                                         daily           



     100 unit/mL                                         before           



     (3 mL)                                         meals.           



     injection                                         E11.65           

 

     insulin      0      Yes       [id number] 50U       inject 50           U

nivers



     degludec      4-13                               Units           ity of



     (TRESIBA      00:00:                               under the           [place]

[place]



     FLEXTOUCH      00                                 skin every           Medi

erna



     U-200) 200                                         morning.           Branc

h



     unit/mL (3                                         E11.65           



     mL) InPn                                                        

 

     insulin      0      Yes       [id number] 10U       inject 10           U

nivers



     aspart      4-13                               Units           ity of



     U-100      00:00:                               under the           [place]



     (NOVOLOG      00                                 skin 3           Medical



     FLEXPEN                                         (three)           Branch



     U-100                                         times           



     INSULIN)                                         daily           



     100 unit/mL                                         before           



     (3 mL)                                         meals.           



     injection                                         E11.65           

 

     insulin      0      Yes       [id number] 10U       inject 10           U

nivers



     aspart      4-13                               Units           ity of



     U-100      00:00:                               under the           [place]



     (NOVOLOG      00                                 skin 3           Medical



     FLEXPEN                                         (three)           Branch



     U-100                                         times           



     INSULIN)                                         daily           



     100 unit/mL                                         before           



     (3 mL)                                         meals.           



     injection                                         E11.65           

 

     insulin      0      Yes       [id number] 10U       inject 10           U

nivers



     aspart      4-13                               Units           ity of



     U-100      00:00:                               under the           [place]



     (NOVOLOG      00                                 skin 3           Medical



     FLEXPEN                                         (three)           Branch



     U-100                                         times           



     INSULIN)                                         daily           



     100 unit/mL                                         before           



     (3 mL)                                         meals.           



     injection                                         E11.65           

 

     insulin      2022- No        [id number] 50U       inject 50           

[facility]



     degludec      4-13 08-24                          Units           ity of



     (TRESIBA      00:00: 00:00                          under the           [facility]

as



     FLEXTOUCH      00   :00                           skin every           Medi

erna



     U-200) 200                                         morning.           Branc

h



     unit/mL (3                                         E11.65           



     mL) InPn                                                        

 

     insulin      2022- No        [id number] 50U       inject 50           

[facility]



     degludec      4-13 08-24                          Units           ity of



     (TRESIBA      00:00: 00:00                          under the           [facility]

as



     FLEXTOUCH      00   :00                           skin every           Medi

erna



     U-200) 200                                         morning.           Branc

h



     unit/mL (3                                         E11.65           



     mL) InPn                                                        

 

     ATORVASTATI            Yes       [id number]           TAKE 1          

 [facility]



     N 20 mg      2-26                               TABLET BY           ity of



     tablet      00:00:                               MOUTH           [place]



               00                                 EVERYDAY           Medical



                                                  AT BEDTIME           Branch

 

     ATORVASTATI            Yes       [id number]           TAKE 1          

 [facility]



     N 20 mg      2-26                               TABLET BY           ity of



     tablet      00:00:                               MOUTH           [place]



               00                                 EVERYDAY           Medical



                                                  AT BEDTIME           Branch

 

     ATORVASTATI            Yes       [id number]           TAKE 1          

 [facility]



     N 20 mg      2-26                               TABLET BY           ity of



     tablet      00:00:                               MOUTH           [place]



               00                                 EVERYDAY           Medical



                                                  AT BEDTIME           Branch

 

     ATORVASTATI      2022- No        [id number]           TAKE 1         

  [facility]



     N 20 mg      2-26 08-24                          TABLET BY           ity of



     tablet      00:00: 00:00                          MOUTH           [place]



               00   :00                           EVERYDAY           Medical



                                                  AT BEDTIME           Branch

 

     ATORVASTATI      2022- No        [id number]           TAKE 1         

  [facility]



     N 20 mg      2-26 08-24                          TABLET BY           ity of



     tablet      00:00: 00:00                          MOUTH           [place]



               00   :00                           EVERYDAY           Medical



                                                  AT BEDTIME           Branch

 

     TRESIBA      -0 - No        [id number] 60U       INJECT 60           

[facility]



     FLEXTOUCH      2-26 04-13                          UNITS           ity of



     U-200 200      00:00: 00:00                          UNDER THE           [facility]

[place]



     unit/mL (3      00   :00                           SKIN EVERY           Med

ical



     mL) InPn                                         MORNING.           Branch



                                                  E11.65           

 

     TRESIBA      -0 2- No        [id number] 60U       INJECT 60           

[facility]



     FLEXTOUCH      2-26 04-13                          UNITS           ity of



     U-200 200      00:00: 00:00                          UNDER THE           [facility]

[facility]



     unit/mL (3      00   :00                           SKIN EVERY           Med

ical



     mL) InPn                                         MORNING.           Branch



                                                  E11.65           

 

     Aspirin 81      -0      Yes       [id number] 81mg      Take 81 mg        

   [facility]



     MG oral      2-17                               by mouth           [facility]



     Chewable      14:04:                               daily           



     Tablet      23                                                

 

     Gabapentin      -0      Yes       [id number] 900mg      Take 3           

[facility]



     300 MG oral      8-23                               capsules           [facility]

old



     Capsule      00:00:                               (900 mg           



               00                                 total) by           



                                                  mouth           



                                                  daily           

 

     aspirin 81      -0      Yes            81mg      Take 81 mg           U

nivers



     mg chewable      6-29                               by mouth           ity 

of



     tablet      14:51:                               daily.           [address]

 

     gabapentin      -0      Yes            300mg      Take 300           Un

kimi



      mg      6-29                               mg by           ity of



     tablet,      14:51:                               mouth           [place]



     extended      07                                 daily.           Medical



     release 24                                                        Branch



     hr                                                          

 

     aspirin 81      -0      Yes            81mg      Take 81 mg           U

nivers



     mg chewable      6-29                               by mouth           ity 

of



     tablet      14:51:                               daily.           [address]

 

     gabapentin      -0      Yes            300mg      Take 300           Un

kimi



      mg      6-29                               mg by           ity of



     tablet,      14:51:                               mouth           [place]



     extended      07                                 daily.           Medical



     release 24                                                        Branch



     hr                                                          

 

     aspirin 81      -0      Yes            81mg      Take 81 mg           U

nivers



     mg chewable      6-29                               by mouth           ity 

of



     tablet      14:51:                               daily.           [address]

 

     gabapentin      202-0      Yes            300mg      Take 300           Un

kimi



      mg      6-29                               mg by           ity of



     tablet,      14:51:                               mouth           [place]



     extended      07                                 daily.           Medical



     release 24                                                        Branch



     hr                                                          

 

     aspirin 81      -0      Yes            81mg      Take 81 mg           U

nivers



     mg chewable      6-29                               by mouth           ity 

of



     tablet      14:51:                               daily.           [place]



               07                                                Medical



                                                                 Branch

 

     gabapentin      -0      Yes            300mg      Take 300           Un

kimi



      mg      6-29                               mg by           ity of



     tablet,      14:51:                               mouth           [place]



     extended      07                                 daily.           Medical



     release 24                                                        Branch



     hr                                                          

 

     aspirin 81      -0      Yes            81mg      Take 81 mg           U

nivers



     mg chewable      6-29                               by mouth           ity 

of



     tablet      14:51:                               daily.           [place]



               07                                                Medical



                                                                 Branch

 

     gabapentin      -0      Yes            300mg      Take 300           Un

kimi



      mg      6-29                               mg by           ity of



     tablet,      14:51:                               mouth           [place]



     extended      07                                 daily.           Medical



     release 24                                                        Branch



     hr                                                          

 

     aspirin 81      -0      Yes            81mg      Take 81 mg           U

nivers



     mg chewable      6-29                               by mouth           ity 

of



     tablet      14:51:                               daily.           [place]



               07                                                Medical



                                                                 Branch

 

     gabapentin      -0      Yes            300mg      Take 300           Un

kimi



      mg      6-29                               mg by           ity of



     tablet,      14:51:                               mouth           [place]



     extended      07                                 daily.           Medical



     release 24                                                        Branch



     hr                                                          

 

     furosemide      -0      Yes                      every           Univer

s



     40 mg      6-29                               morning           ity of



     tablet      00:00:                               and            [place]



               00                                 evening.           Medical



                                                                 Branch

 

     furosemide      -0      Yes                      every           Univer

s



     40 mg      6-29                               morning           ity of



     tablet      00:00:                               and            [place]



               00                                 evening.           Medical



                                                                 Branch

 

     furosemide      -0      Yes                      every           Univer

s



     40 mg      6-29                               morning           ity of



     tablet      00:00:                               and            [place]



               00                                 evening.           Medical



                                                                 Branch

 

     furosemide      -0      Yes                      every           Univer

s



     40 mg      6-29                               morning           ity of



     tablet      00:00:                               and            [place]



               00                                 evening.           Medical



                                                                 Branch

 

     furosemide      -0      Yes                      every           Univer

s



     40 mg      6-29                               morning           ity of



     tablet      00:00:                               and            [place]



               00                                 evening.           Medical



                                                                 Branch

 

     furosemide      -0      Yes                      every           Univer

s



     40 mg      6-29                               morning           ity of



     tablet      00:00:                               and            [place]



               00                                 evening.           Medical



                                                                 Branch

 

     insulin      2022- No        [id number] 20U       inject 20           

[facility]



     aspart      - 04-13                          Units           ity of



     U-100      00:00: 00:00                          under the           [place]



     (NOVOLOG      00   :00                           skin 3           Medical



     FLEXPEN                                         (three)           Branch



     U-100                                         times           



     INSULIN)                                         daily           



     100 unit/mL                                         before           



     (3 mL)                                         meals.           



     injection                                         E11.65           

 

     insulin      2022- No        [id number] 20U       inject 20           

[facility]



     aspart      - 04-13                          Units           ity of



     U-100      00:00: 00:00                          under the           [place]



     (NOVOLOG      00   :00                           skin 3           Medical



     FLEXPEN                                         (three)           Branch



     U-100                                         times           



     INSULIN)                                         daily           



     100 unit/mL                                         before           



     (3 mL)                                         meals.           



     injection                                         E11.65           

 

     spironolact      0      Yes            1{tbl}      Take 1           Un

kimi



     one 50 mg      6-25                               tablet by           ity o

f



     tablet      00:00:                               mouth.           [place]



                                                               Medical



                                                                 Branch

 

     spironolact      0      Yes            1{tbl}      Take 1           Un

kimi



     one 50 mg      6-25                               tablet by           ity o

f



     tablet      00:00:                               mouth.           [place]



                                                               Medical



                                                                 Branch

 

     spironolact      0      Yes            1{tbl}      Take 1           Un

kimi



     one 50 mg      6-25                               tablet by           ity o

f



     tablet      00:00:                               mouth.           [address]

 

     spironolact            Yes            1{tbl}      Take 1           Un

kimi



     one 50 mg      6-25                               tablet by           ity o

f



     tablet      00:00:                               mouth.           [address]

 

     spironolact      0      Yes            1{tbl}      Take 1           Un

ikmi



     one 50 mg      6-25                               tablet by           ity o

f



     tablet      00:00:                               mouth.           [address]



                                                                 Branch

 

     spironolact      0      Yes            1{tbl}      Take 1           Un

kimi



     one 50 mg      6-25                               tablet by           ity o

f



     tablet      00:00:                               mouth.           [address]

 

     Amlodipine      0      Yes       [id number] 1{tbl}      Take 1          

 [facility]



     Besylate 5      6-25                               tablet by           [facility]

[facility]



     MG oral      00:00:                               mouth           



     Tablet      00                                 daily           

 

     Furosemide      -0      Yes       [id number] 1{tbl}      Take 1          

 [facility]



     40 MG oral      6-25                               tablet by           [facility]

old



     Tablet      00:00:                               mouth           



               00                                 daily           

 

     Spironolact      -0      Yes       [id number] 1{tbl}      Take 1         

  [facility]



     one 50 MG      6-25                               tablet by           [facility]

ld



     oral Tablet      00:00:                               mouth           



               00                                 daily           

 

     carvediloL      -0      Yes            1{tbl}      Take 1           Uni

vers



     3.125 mg      6-23                               tablet by           ity of



     tablet      00:00:                               mouth.           [address]

 

     carvediloL      -0      Yes            1{tbl}      Take 1           Uni

vers



     3.125 mg      6-23                               tablet by           ity of



     tablet      00:00:                               mouth.           [address]

 

     carvediloL            Yes            1{tbl}      Take 1           Uni

vers



     3.125 mg      6-23                               tablet by           ity of



     tablet      00:00:                               mouth.           [place]



                                                               Medical



                                                                 Branch

 

     Carvedilol            Yes       [id number] 1{tbl}      Take 1          

 [facility]



     3.125 MG      6-23                               tablet by           [facility]

d



     oral Tablet      00:00:                               mouth           



               00                                 daily           

 

     carvediloL      2022- No             1{tbl}      Take 1           Un

kimi



     3.125 mg      6-23 08-24                          tablet by           ity o

f



     tablet      00:00: 00:00                          mouth.           [place]



               00   :00                                          Medical



                                                                 Branch

 

     carvediloL      2022- No             1{tbl}      Take 1           Un

kimi



     3.125 mg      6-23 08-24                          tablet by           ity o

f



     tablet      00:00: 00:00                          mouth.           [place]



               00   :00                                          Medical



                                                                 Branch

 

     Insulin            Yes       [id number] 60U       Inject 60           K

elsey



     Degludec      6-02                               units into           [facility]

ld



     (Tresiba      00:00:                               the skin           



     FlexTouch)      00                                 every           



     200 UNIT/ML                                         morning           



     subcutaneou                                                        



     s Solution                                                        



     Pen-injecto                                                        



     r                                                           

 

     HYDROcodone            Yes            1{tbl}      Take 1           Un

kimi



     -acetaminop      4-05                               tablet by           ity

 of



     hen 7.5-325      00:00:                               mouth.           [place]

[facility]



     mg per      00                                                Medical



     tablet                                                        [facility]

 

     ondansetron            Yes            1{tbl}      Take 1           Un

kimi



     4 mg      4-05                               tablet by           ity of



     disintegrat      00:00:                               mouth.           [place]

[facility]



     ing tablet      00                                                Medical



                                                                 [facility]anch

 

     HYDROcodone            Yes            1{tbl}      Take 1           Un

kimi



     -acetaminop      4-05                               tablet by           ity

 of



     hen 7.5-325      00:00:                               mouth.           [place]

[facility]



     mg per      00                                                Medical



     tablet                                                        [facility]

 

     ondansetron            Yes            1{tbl}      Take 1           Un

kimi



     4 mg      4-05                               tablet by           ity of



     disintegrat      00:00:                               mouth.           [place]

[facility]



     ing tablet      00                                                Medical



                                                                 Branch

 

     HYDROcodone            Yes            1{tbl}      Take 1           Un

kimi



     -acetaminop      4-05                               tablet by           ity

 of



     hen 7.5-325      00:00:                               mouth.           [place]

[facility]



     mg per      00                                                Medical



     tablet                                                        [facility]anch

 

     ondansetron            Yes            1{tbl}      Take 1           Un

kimi



     4 mg      4-05                               tablet by           ity of



     disintegrat      00:00:                               mouth.           [place]

[facility]



     ing tablet      00                                                Medical



                                                                 Branch

 

     HYDROcodone            Yes            1{tbl}      Take 1           Un

kimi



     -acetaminop      4-05                               tablet by           ity

 of



     hen 7.5-325      00:00:                               mouth.           [place]

[facility]



     mg per      00                                                Medical



     tablet                                                        Branch

 

     ondansetron            Yes            1{tbl}      Take 1           Un

kimi



     4 mg      4-05                               tablet by           ity of



     disintegrat      00:00:                               mouth.           [place]

[facility]



     ing tablet      00                                                Medical



                                                                 Branch

 

     HYDROcodone            Yes            1{tbl}      Take 1           Un

kimi



     -acetaminop      4-05                               tablet by           ity

 of



     hen 7.5-325      00:00:                               mouth.           [place]

[facility]



     mg per      00                                                Medical



     tablet                                                        [facility]anch

 

     ondansetron            Yes            1{tbl}      Take 1           Un

kimi



     4 mg      4-05                               tablet by           ity of



     disintegrat      00:00:                               mouth.           [place]

[facility]



     ing tablet      00                                                Medical



                                                                 Branch

 

     HYDROcodone            Yes            1{tbl}      Take 1           Un

kimi



     -acetaminop      4-05                               tablet by           ity

 of



     hen 7.5-325      00:00:                               mouth.           [place]

[facility]



     mg per      00                                                Medical



     tablet                                                        Branch

 

     ondansetron            Yes            1{tbl}      Take 1           Un

kimi



     4 mg      4-05                               tablet by           ity of



     disintegrat      00:00:                               mouth.           [place]

[facility]



     ing tablet      00                                                Medical



                                                                 Branch

 

     HYDROcodone            Yes       [id number] 1{tbl}      Take 1         

  [facility]



     -Acetaminop      4-05                               tablet by           [facility]

[facility]



     hen 7.5-325      00:00:                               mouth as           



     MG oral      00                                 needed           



     Tablet                                                        

 

     Ondansetron            Yes       [id number] 1{tbl}      Take 1         

  [facility]



     (ZOFRAN) 4      4-05                               tablet by           [facility]

old



     MG oral      00:00:                               mouth           



     TABLET      00                                 daily           



     DISPERSIBLE                                                        

 

     Insulin      2020      Yes       [id number] 20U       Inject 20           K

elsey



     Aspart 100      1-03                               units into           [facility]

[facility]



     UNIT/ML      00:00:                               the skin 3           



     subcutaneou      00                                 times           



     s Solution                                         daily           



     Pen-injecto                                         (before           



     r                                            meals)           

 

     Atorvastati      2020      Yes       [id number] 20mg      Take 20 mg    

       [facility]



     n Calcium      1-03                2              by mouth           [facility]

d



     20 MG oral      00:00:                                              



     Tablet      00                                                

 

     losartan-hy      2020-0      Yes                                     Univer

s



     drochloroth      2-24                                              ity of



     iazide      00:00:                                              [place]



     50-12.5 mg      00                                                Medical



     per tablet                                                        Branch

 

     losartan-hy      2020-0      Yes                                     Univer

s



     drochloroth      2-24                                              ity of



     iazide      00:00:                                              [place]



     50-12.5 mg      00                                                Medical



     per tablet                                                        Branch

 

     losartan-hy      2020-0      Yes                                     Univer

s



     drochloroth      2-24                                              ity of



     iazide      00:00:                                              [place]



     50-12.5 mg      00                                                Medical



     per tablet                                                        Branch

 

     losartan-hy      2020-0      Yes                                     Univer

s



     drochloroth      2-24                                              ity of



     iazide      00:00:                                              [place]



     50-12.5 mg      00                                                Medical



     per tablet                                                        Branch

 

     losartan-hy      2020-0      Yes                                     Univer

s



     drochloroth      2-24                                              ity of



     iazide      00:00:                                              [place]



     50-12.5 mg      00                                                Medical



     per tablet                                                        Branch

 

     losartan-hy      2020-0      Yes                                     Univer

s



     drochloroth      2-24                                              ity of



     iazide      00:00:                                              [place]



     50-12.5 mg      00                                                Medical



     per tablet                                                        Branch

 

     potassium      2019      Yes                                     [facility]



     chloride      0-31                                              ity of



     (KLOR-CON      00:00:                                              [place]



     10) 10 mEq      00                                                Medical



     CR tablet                                                        Branch

 

     potassium      2019      Yes                                     [facility]



     chloride      0-31                                              ity of



     (KLOR-CON      00:00:                                              [place]



     10) 10 mEq      00                                                Medical



     CR tablet                                                        Branch

 

     potassium      2019      Yes                                     [facility]



     chloride      0-31                                              ity of



     (KLOR-CON      00:00:                                              [place]



     10) 10 mEq      00                                                Medical



     CR tablet                                                        Branch

 

     potassium      2019      Yes                                     [facility]



     chloride      0-31                                              ity of



     (KLOR-CON      00:00:                                              [place]



     10) 10 mEq      00                                                Medical



     CR tablet                                                        Branch

 

     potassium      2019      Yes                                     [facility]



     chloride      0-31                                              ity of



     (KLOR-CON      00:00:                                              [place]



     10) 10 mEq      00                                                Medical



     CR tablet                                                        Branch

 

     potassium      2019      Yes                                     [facility]



     chloride      0-31                                              ity of



     (KLOR-CON      00:00:                                              [place]



     10) 10 mEq      00                                                Medical



     CR tablet                                                        Branch

 

     Insulin            Yes       [id number]           Use as           Univ

ers



     Gravel Switch,                                     directed.           ity of



     Disposable,      00:00:                               4 times           [facility]

as



     (MIHIR PEN      00                                 daily. E           Med[facility]

l



     NEEDLE) 32                                         11.40           Branch



     gauge x                                                        



     5/32" Ndle                                                        

 

     Insulin      2018-0      Yes       [id number]           Use as           Univ

ers



     Gravel Switch,                                     directed.           ity of



     Disposable,      00:00:                               4 times           [facility]

[facility]



     (MIHIR PEN      00                                 daily. E           [facility]

l



     NEEDLE) 32                                         11.40           Branch



     gauge x                                                        



     5/32" Ndle                                                        

 

     Insulin      -0      Yes       [id number]           Use as           Univ

ers



     Gravel Switch,                                     directed.           ity of



     Disposable,      00:00:                               4 times           [facility]

[facility]



     (MIHIR PEN      00                                 daily. E           [facility]

l



     NEEDLE) 32                                         11.40           Branch



     gauge x                                                        



     5/32" Ndle                                                        

 

     Insulin      -0      Yes       [id number]           Use as           Univ

ers



     Gravel Switch,                                     directed.           ity of



     Disposable,      00:00:                               4 times           [facility]

[facility]



     (MIHIR PEN      00                                 daily. E           [facility]

l



     NEEDLE) 32                                         11.40           Branch



     gauge x                                                        



     5/32" Ndle                                                        

 

     Insulin      -0      Yes       [id number]           Use as           Univ

ers



     Gravel Switch,                                     directed.           ity of



     Disposable,      00:00:                               4 times           [facility]

[facility]



     (MIHIR PEN      00                                 daily. E           [facility]

l



     NEEDLE) 32                                         11.40           Branch



     gauge x                                                        



     5/32" Ndle                                                        

 

     Insulin      -0      Yes       [id number]           Use as           Univ

ers



     Gravel Switch,                                     directed.           ity of



     Disposable,      00:00:                               4 times           [facility]

[facility]



     (MIHIR PEN      00                                 daily. E           [facility]

l



     NEEDLE) 32                                         11.40           Branch



     gauge x                                                        



     5/32" Ndle                                                        

 

     amitriptyli      -0      Yes                      TAKE 1           Univ

ers



     ne 25 mg      6-28                               TABLET BY           ity of



     tablet      00:00:                               MOUTH           [place]



               00                                 TWICE A           Medical



                                                  DAY            Branch

 

     amitriptyli      0      Yes                      TAKE 1           Univ

ers



     ne 25 mg      6-28                               TABLET BY           ity of



     tablet      00:00:                               MOUTH           [place]



               00                                 TWICE A           Medical



                                                  DAY            Branch

 

     amitriptyli      0      Yes                      TAKE 1           Univ

ers



     ne 25 mg      6-28                               TABLET BY           ity of



     tablet      00:00:                               MOUTH           [place]



               00                                 TWICE A           Medical



                                                  DAY            Branch

 

     amitriptyli            Yes                      TAKE 1           Univ

ers



     ne 25 mg      6-28                               TABLET BY           ity of



     tablet      00:00:                               MOUTH           [place]



               00                                 TWICE A           Medical



                                                  DAY            Branch

 

     amitriptyli      0      Yes                      TAKE 1           Univ

ers



     ne 25 mg      6-28                               TABLET BY           ity of



     tablet      00:00:                               MOUTH           [place]



               00                                 TWICE A           Medical



                                                  DAY            Branch

 

     amitriptyli      0      Yes                      TAKE 1           Univ

ers



     ne 25 mg      6-28                               TABLET BY           ity of



     tablet      00:00:                               MOUTH           [place]



               00                                 TWICE A           Medical



                                                  DAY            Branch

 

     amLODIPine            Yes            10mg      Take 10 mg           U

nivers



     10 mg      6-24                               by mouth           ity of



     tablet      00:00:                               daily.           [place]



               00                                                [facility]

 

     amLODIPine            Yes            10mg      Take 10 mg           U

nivers



     10 mg      6-24                               by mouth           ity of



     tablet      00:00:                               daily.           [place]



               00                                                [facility]

 

     amLODIPine            Yes            10mg      Take 10 mg           U

nivers



     10 mg      6-24                               by mouth           ity of



     tablet      00:00:                               daily.           [place]



               00                                                [facility]

 

     amLODIPine      2022- No             10mg      Take 10 mg           

[facility]



     10 mg      6-24 0824                          by mouth           ity of



     tablet      00:00: 00:00                          daily.           [place]



               00   :                                          [facility]

 

     amLODIPine      2022- No             10mg      Take 10 mg           

[facility]



     10 mg      6-24 -24                          by mouth           ity of



     tablet      00:00: 00:00                          daily.           [place]



               00   :00                                          [facility]







Immunizations







           Ordered    Filled Immunization Date       Status     Comments   [facility]

e



           Immunization Name Name                                        

 

           SARS-COV-2 COVID-19            2021 Completed             Unive

rsity of



           PFIZER VACCINE            00:00:00                         [facility]

 

           SARS-COV-2 COVID-19            2021 Completed             Unive

rsity of



           PFIZER VACCINE            00:00:00                         [facility]

 

           SARS-COV-2 COVID-19            2021 Completed             Unive

rsity of



           PFIZER VACCINE            00:00:00                         [facility]

 

           SARS-COV-2 COVID-19            2021 Completed             Unive

rsity of



           PFIZER VACCINE            00:00:00                         [facility]

 

           SARS-COV-2 COVID-19            2021 Completed             Unive

rsity of



           PFIZER VACCINE            00:00:00                         [facility]

 

           SARS-COV-2 COVID-19            2021 Completed             Unive

rsity of



           PFIZER VACCINE            00:00:00                         [facility]

 

           Covid-19 Vaccine            2021 Completed             [facility]



           (Pfizer), Mrna-lnp,            00:00:00                         



           Jemal Protein, Pf,                                             



           30mcg/0.3ml,IM                                             

 

           Covid-19 Vaccine            2021 Completed             [facility]



           (Pfizer), Mrna-lnp,            00:00:00                         



           Jemal Protein, Pf,                                             



           30mcg/0.3ml,IM                                             

 

           Influenza Virus            2020 Completed             [facility]

y of



           Vaccine Quad .5 mL            00:00:00                         [place] 

Medical



           IM 6+ MO                                               Branch

 

           Influenza Virus            2020 Completed             [facility]

y of



           Vaccine Quad .5 mL            00:00:00                         [place] 

[facility]



           IM 6+ MO                                               Branch

 

           Influenza Virus            2020 Completed             Universit

y of



           Vaccine Quad .5 mL            00:00:00                         [place] 

[facility]



           IM 6+ MO                                               Branch

 

           Influenza Virus            2020 Completed             Universit

y of



           Vaccine Quad .5 mL            00:00:00                         [place] 

[facility]



           IM 6+ MO                                               Branch

 

           Influenza Virus            2020 Completed             Universit

y of



           Vaccine Quad .5 mL            00:00:00                         [place] 

[facility]



           IM 6+ MO                                               Branch

 

           Influenza Virus            2020 Completed             Universit

y of



           Vaccine Quad .5 mL            00:00:00                         [facility]



           IM 6+ MO                                               Branch

 

           Influenza Virus            2020 Completed             [facility] daleold



           Vaccine, No            00:00:00                         



           Preserv, age 6                                             



           months and up                                             

 

           Td- Tetanus &            2015 Completed             [place]beba Dockery

old



           Diphtheria Vaccine            00:00:00                         



           (age 7+ years)                                             

 

           Td- Tetanus &            2015 Completed             [place] Dockery

old



           Diphtheria Vaccine            00:00:00                         



           (age 7+ years)                                             







Vital Signs







             Vital Name   Observation Time Observation Value Comments     Source

 

             Systolic blood 2022 18:10:00 109 mm[Hg]                Univer

sity of



             [facility]

 

             Diastolic blood 2022 18:10:00 73 mm[Hg]                 Unive

rsity of



             pressure                                            [facility]

 

             Heart rate   2022 18:00:00 74 /min                   [facility]

 

             Body weight  2022 18:00:00 100.018 kg                [facility]

 

             BMI          2022 18:00:00 37.85 kg/m2               [facility]

 

             Oxygen saturation in 2022 18:00:00 97 /min                   

[facility]



             Arterial blood by                                        [place] [facility]

erna



             Pulse oximetry                                        Branch

 

             Systolic blood 2022 18:52:00 117 mm[Hg]                Univer

sity of



             pressure                                            [facility]

 

             Diastolic blood 2022 18:52:00 74 mm[Hg]                 Unive

rsity of



             pressure                                            [facility]

 

             Heart rate   2022 18:52:00 106 /min                  [facility]

 

             Body weight  2022 18:52:00 100.064 kg                [facility]

 

             BMI          2022 18:52:00 37.87 kg/m2               [facility]

 

             Oxygen saturation in 2022 18:52:00 92 /min                   

[facility] blood by                                        [place] [facility]

erna



             Pulse oximetry                                        Branch

 

             Systolic blood 2022 19:59:00 127 mm[Hg]                Abe[facility]

 Seybold



             pressure                                            

 

             Diastolic blood 2022 19:59:00 86 mm[Hg]                 Kelse

y Seybold



             pressure                                            

 

             Heart rate   2022 19:59:00 94 /min                   Abeba S

eybold

 

             Body temperature 2022 19:59:00 36.39 Emely                 Josiane

ey Seybold

 

             Respiratory rate 2022 19:59:00 16 /min                   Josiane

ey Seybold

 

             Body height  2022 19:59:00 160 cm                    Abeba S

eybold

 

             Body weight  2022 19:59:00 106.867 kg                [facility]beba S

eybold

 

             BMI          2022 19:59:00 41.73 kg/m2               [facility]beba S

eybold







Procedures







                Procedure       Date / Time Performed Performing Clinician Sourallie

e

 

                POCT HEMOGLOBIN A1C 2022 18:12:00 Andrey Viera    [facility]

 

                POCT HEMOGLOBIN A1C 2022 19:04:00 [facility]    [facility]







Plan of Care







             Planned Activity Planned Date Details      Comments     Source

 

             Future Scheduled 2022   INFLUENZA VACCINE (#1)              C

[facility]



             Test         00:00:00     [code = INFLUENZA              Medical Ce

nter



                                       VACCINE (#1)]              

 

             Future Scheduled 2022   DEPRESSION SCREENING              [facility]



             Test         00:00:00     (12+) [code =              Medical Center



                                       DEPRESSION SCREENING              



                                       (12+)]                    

 

             Future Scheduled 2022   DEPRESSION SCREENING              [facility]



             Test         00:00:00     (12+) [code =              Medical Center



                                       DEPRESSION SCREENING              



                                       (12+)]                    

 

             Future Scheduled 2021   INFLUENZA VACCINE (#1)              C

[facility]



             Test         00:00:00     [code = INFLUENZA              Medical Ce

nter



                                       VACCINE (#1)]              

 

             Future Scheduled 2007   SHINGLES VACCINES (1 of              

[facility]



             Test         00:00:00     2) [code = SHINGLES              Medical 

Center



                                       VACCINES (1 of 2)]              

 

             Future Scheduled 2007   SHINGLES VACCINES (1 of              

[facility]



             Test         00:00:00     2) [code = SHINGLES              Medical 

Center



                                       VACCINES (1 of 2)]              

 

             Future Scheduled 2002   Lipid panel (procedure)              

[facility]



             Test         00:00:00     [code = 21078365]              Medical Ce

nter

 

             Future Scheduled 2002   Lipid panel (procedure)              

[facility]



             Test         00:00:00     [code = 68121518]              Medical Ce

nter

 

             Future Scheduled 1978   Screening for malignant              

[facility]



             Test         00:00:00     neoplasm of cervix              Medical C

enter



                                       (procedure) [code =              



                                       368949949]                

 

             Future Scheduled 1978   Screening for malignant              

[facility]



             Test         00:00:00     neoplasm of cervix              Medical C

enter



                                       (procedure) [code =              



                                       510386742]                

 

             Future Scheduled 1976   DTAP/TDAP/TD VACCINES              [facility]

[facility]



             Test         00:00:00     (1 - Tdap) [code =              Medical C

enter



                                       DTAP/TDAP/TD VACCINES              



                                       (1 - Tdap)]               

 

             Future Scheduled 1976   DTAP/TDAP/TD VACCINES              [facility]

[facility]



             Test         00:00:00     (1 - Tdap) [code =              Medical C

enter



                                       DTAP/TDAP/TD VACCINES              



                                       (1 - Tdap)]               

 

             Future Scheduled 1975   HEPATITIS C SCREENING              [facility]

[facility]



             Test         00:00:00     [code = HEPATITIS C              Medical 

Center



                                       SCREENING]                

 

             Future Scheduled 1975   HEPATITIS C SCREENING              [facility]

[facility]



             Test         00:00:00     [code = HEPATITIS C              Medical 

Center



                                       SCREENING]                

 

             Future Scheduled 1962   COVID-19 VACCINE (1)              [facility]



             Test         00:00:00     [code = COVID-19              Medical [facility]

ter



                                       VACCINE (1)]              

 

             Future Scheduled 1958   COVID-19 VACCINE (#1)              [facility]

[facility]



             Test         00:00:00     [code = COVID-19              Medical [place]

ter



                                       VACCINE (#1)]              

 

             Future Scheduled 1957   Screening for malignant              

[facility]



             Test         00:00:00     neoplasm of colon              Medical Ce

nter



                                       (procedure) [code =              



                                       958231241]                

 

             Future Scheduled 1957   Screening for malignant              

[facility]



             Test         00:00:00     neoplasm of breast              Medical C

enter



                                       (procedure) [code =              



                                       329364430]                

 

             Future Scheduled 1957   CT Colonography (combo)              

[facility]



             Test         00:00:00     [code = CT Colonography              [facility]



                                       (combo)]                  

 

             Future Scheduled 1957   Screening for malignant              

[facility]



             Test         00:00:00     neoplasm of colon              Medical Ce

nter



                                       (procedure) [code =              



                                       941380269]                

 

             Future Scheduled 1957   Screening for malignant              

[facility]



             Test         00:00:00     neoplasm of colon              Medical Ce

nter



                                       (procedure) [code =              



                                       470849651]                

 

             Future Scheduled 1957   Screening for malignant              

[facility]



             Test         00:00:00     neoplasm of colon              Medical Ce

nter



                                       (procedure) [code =              



                                       528840591]                

 

             Future Scheduled 1957   Screening for malignant              

[facility]



             Test         00:00:00     neoplasm of colon              Medical Ce

nter



                                       (procedure) [code =              



                                       081609644]                

 

             Future Scheduled 1957   Sigmoidoscopy [code =              [facility]



             Test         00:00:00     Sigmoidoscopy]              Medical Cente

r

 

             Future Scheduled 1957   Screening for malignant              

[facility]



             Test         00:00:00     neoplasm of breast              Medical C

enter



                                       (procedure) [code =              



                                       189533976]                







Encounters







        Start   End     Encounter Admission Attending Care    Care    Encounter 

Source



        Date/Time Date/Time Type    Type    Clinicians Facility Department ID   

   

 

        2022         Outpatient 3       NOMI MICHAEL   DIANE     [id number]

 [facility]



        13:41:13                         [place]                 0110    



                                                                        Health



                                                                        Rehabil



                                                                        itation



                                                                        [place]



                                                                        d

 

        2022         Outpatient 3       NOMI MICHAEL   [place]ANE     [id number]

 [facility]



        13:40:23                         [place]                 0107    



                                                                        Health



                                                                        Rehabil



                                                                        itation



                                                                        [place]



                                                                        d

 

        2022         Outpatient 3       [id number]  [facility]   REF     [id number]

 [facility]



        13:39:49                                                 0106    



                                                                        Health



                                                                        Rehabil



                                                                        itation



                                                                        [place]



                                                                        d

 

        2021         [facility]      [place] [facility]   [id number] C

[facility]



        00:00:00         Encounter         Shanita Solis

[facility]

 

        2023 Outpatient         ABEBA DASILVA  [id number]

 [place]



        08:00:00 08:00:00                 IVETTE wang

 

        2022 Outpatient         ABEBA ULLOA  [id number]

149 [place]



        00:00:00 00:00:00                 ASHLEY wang

 

        2022 [facility]         [facility]    [id number]

6977 Univers



        00:00:00 00:00:00                 [facility]  [id number]         it

y of



                                                [place] [id number]         [place]orenzo

as



                                                AILIN?BLEA [phone]         [address]



                                                MEDICAL                 



                                                OFFICE                  



                                                BUILDING                 

 

        2022 Outpatient R       MAXIMILIANO    [facility]    [id number]

243 Univers



        13:00:00 13:42:53                 [facility] [place]

 

        2022 Office          [facility]    1.2.840.114 [id number]

82 Univers



        13:00:00 13:42:53 Visit           [place] [id number]         it

y of



                                                [place] [id number]         [place]orenzo

as



                                                AILIN?BLEA [phone]         [address]                 



                                                OFFICE                  



                                                [facility]                 

 

        2022 Outpatient         ABEBA ULLOA  [id number]

425 [place]



        00:00:00 00:00:00                 ASHLEY wang

 

        2022 Refill          [facility]    1.2.840.114 [id number]

56 Univers



        00:00:00 00:00:00                 [facility]  [id number]         it

y of



                                                [place] [id number]         [place]oren[place]

as



                                                AILIN?BLEA [phone]         [address]                 



                                                OFFICE                  



                                                BUILDING                 

 

        2022 Office          [facility]    1.2.840.114 [id number]

98 Univers



        14:00:00 14:38:25 Visit           [facility]  350.1.13.10         it

y of



                                                [place] [id number]         [place]orenzo

as



                                                AILIN?BLEA [phone]         [address]



                                                MEDICAL                 



                                                OFFICE                  



                                                BUILDING                 

 

        202213 Outpatient R       MAXIMILIANO    [facility]    [id number]

741 Univers



        14:00:00 [facility]:38:25                 [facility]

 

        [facility]22 Outpatient R       MAXIMILIANO    [facility]    [id number]

741 Univers



        14:00:00 14:00:00                 [facility]

 

        2022 Outpatient R       MAXIMILIANO    [facility]    [id number]

429 Univers



        13:00:00 13:00:00                 [facility] [place]

 

        2022 Orders          Doctor  KELLY    1.2.840.114 [id number]

37 Univers



        00:00:00 00:00:00 Only            Unassigned, YADIEL   350.1.13.10       

  ity [facility] [id number]         Loren[place]

as



                                                        [phone]         [address]

 

        2022 Outpatient         ABEBA ULLOA  [id number]

639 [place]



        14:00:00 14:00:00                 ASHLEY wang

 

        2022 Outpatient         ABEBA ULLOA  [id number]

206 [place]



        00:00:00 00:00:00                 ASHLEY wang

 

        2022 Refill          Maximiliano    [facility]    1.2.840.114 [id number]

02 Univers



        00:00:00 00:00:00                 Andrey REAVES 350.1.13.10         i

ty [facility] [id number]         [place]

[place]



                                                PROFESSIO [phone]         Me

dical



                                                [facility]     220             [facility]                 

 

        2022 Outpatient         ABEBA ULLOA  [id number]

043 [place]



        15:00:00 15:00:00                 ASHLEY wang

 

        2022 Outpatient         LAB90   ABEBA HELM  [id number]

69 [place]



        14:50:00 14:50:00                                                 Seybol

d

 

        2022 Office          Wally Ulloa    1.2.840.114 [id number]

6942 [place]



        14:00:00 14:15:00 Visit           Ashley Ott 350.1.13.13         

paul Carnes         1.2.7.2.686         



                                                        [phone]         



                                                        0               

 

        2022-02-10 2022-02-10 Refill          Viera,    [facility]    1.2.840.114 [id number]

71 Univers



        00:00:00 00:00:00                 [place]ong [place] 350.1.13.10         i

ty of



                                                [place] 4.2.7.2.686         [place]

[place]



                                                PROFESSIO [phone]         Me

dical



                                                NAL     231             [facility]                 

 

        2022 Outpatient         ABEBA ULLOA  [id number]

374 [place]



        08:45:00 08:45:00                 ASHLEYJENNIFER Olsensuyapaol

felipe

 

        2022 Outpatient         ABEBA ULLOA  [id number]

584 [place]



        00:00:00 00:00:00                 ASHLEY                           dee wang

 

        2022 Outpatient         ABEBA ULLOA  [id number]

360 [place]



        00:00:00 00:00:00                 ASHLEY Dockeryol

felipe

 

        2022 Refill          Maximiliano,    [facility]    1.2.840.114 [id number]

22 Univers



        00:00:00 00:00:00                 [place]entong [place] 350.1.13.10         i

ty of



                                                [place] 4.2.7.2.686         [place]

s



                                                PROFESSIO [phone]         Me

dical



                                                NAL     220             [facility]                 

 

        2021 Refill          Viera,    [facility]    1.2.840.114 [id number]

02 Univers



        00:00:00 00:00:00                 [place]entong [place] 350.1.13.10         i

ty of



                                                [place] 4.2.7.2.686         [place]

s



                                                PROFESSIO [phone]         Me

dical



                                                NAL     220             [facility]                 

 

        2021 Refill          Viera,    [facility]    1.2.840.114 [id number]

26 Univers



        00:00:00 00:00:00                 [place]entong [place] 350.1.13.10         i

ty of



                                                [place] 4.2.7.2.686         [place]

s



                                                Profess[place] [phone]         Me

dical



                                                nal     220             [facility]                 

 

        2021 Outpatient         ABEBA STANFORD  [id number]

65 [place]



        14:00:00 14:00:00                 JOSE ALFREDO Dockerygerardo wang

 

        2021 Outpatient         ARTEMIOABEBA  [id number]

605 [place]



        13:30:00 13:30:00                 ASHLEY Olsendee wang

 

        2021 Outpatient         ABEBA BEARD  [id number]

37 [place]



        00:00:00 00:00:00                 ALFREDO Dockery

old

 

        2021 Office          [facility]    1.2.840.114 [id number]

75 Univers



        14:34:06 15:31:34 Visit           Andrey Reaves 350.1.13.10         i

ty of



                                                [place] 4.2.7.2.686         [place]

s



                                                Professio [phone]         [address]                 

 

        2021 Outpatient R       MAXIMILIANO    [facility]    [id number]

063 Univers



        15:00:00 15:00:00                 [facility] [place]

 

        2021 Refill          [facility]    1.2.840.114 [id number]

67 Univers



        00:00:00 00:00:00                 Andrey Reaves 350.1.13.10         i

ty of



                                                [place] 4.2.7.2.686         [place]

s



                                                Professio [phone]         [address]                 

 

        2021 Outpatient R       SOFI [facility]    [id number]

73205 Univers



        12:50:00 12:50:00                 CHAIM                             [facility]

 

        2021 Refill          [facility]    1.2.840.114 [id number]

31 Univers



        00:00:00 00:00:00                 Andrey Reaves 350.1.13.10         i

ty of



                                                [place] 4.2.7.2.686         [place]

s



                                                Professio [phone]         [address]                 

 

        2020-11-10 2020-11-10 Telephone         [facility]    [id number]

0101 Univers



        00:00:00 00:00:00                 [place]ong [place] 350.1.13.10         i

ty of



                                                [place] 4.2.7.2.686         [place]

s



                                                Professio [phone]         Me

[facility]     [address]             [facility]                 

 

        2020 Technician         2, Adc Lab [facility]    1.2.840.114 

[id number] Univers



        11:25:13 11:40:13 Visit           Andrey Viera 350.1.13.10     

    ity of



                                                [place] 4.2.7.2.686         [place]

[place]



                                                [place] [phone]         [facility]     353             [facility]                 

 

        2020 Office          [facility]    1.2.840.114 [id number]

30 Univers



        09:53:21 10:52:57 Visit           Andrey Reaves 350.1.13.10         i

ty of



                                                [place] 4.2.7.2.686         [place]

[place]



                                                Profess[place] [phone]         [facility]     220             [facility]                 

 

        2020 Office          [facility]    1.2.840.114 [id number]

30 



        09:53:21 10:52:57 Visit           Andrey [place] 350.1.13.10         



                                                [place] 4.2.7.2.686         



                                                Professio [phone]         



                                                [address]                 

 

        2020 Outpatient R       MAXIMILIANO    [facility]    [id number]

191 Univers



        10:00:00 10:00:00                 EUGENIAONG                         ity of



                                                                        [facility]

 

        2020-10-22 2020-10-22 Telephone         [facility]    [id number]

8022 Univers



        00:00:00 00:00:00                 Eugeniaong [place] 350.1.13.10         i

ty of



                                                [place] 4.2.7.2.686         [place]

[place]



                                                Profess[place] [phone]         [facility]     220             [facility]                 

 

        2020-10-13 2020-10-13 Telephone         [facility]    1.2.840.133 [id number]

8213 Univers



        00:00:00 00:00:00                 Wentong [place] 350.1.13.10         i

ty of



                                                [place] 4.2.7.2.686         [place]

[place]



                                                Professio [phone]         [address]                 

 

        2020 Outpatient R       VIERA,    [facility]    [id number]

446 Univers



        10:00:00 10:00:00                 WENTONG                         ity of



                                                                        [facility]

 

        2020 Telemedici         [facility]    1.2.840.114 [id number]

[id number] Univers



        08:00:45 08:30:45 ne Visit         Andrey [place] 350.1.13.10         

ity of



                                                [place] 4.2.7.2.686         [place]

[place]



                                                Professio [phone]         [address]                 

 

        2020 Outpatient R       RADIOLOGY [facility]    [id number]

[id number] Univers



        10:52:02 23:59:00                                                 ity of



                                                                        [facility]

 

        2020 Hospital         Radiology [facility]    1.2.840.114 [id number]

[id number] Univers



        10:52:00 23:59:00 Encounter                 [place] 350.1.13.10        

 ity of



                                                [place] 4.2.7.2.686         [place]

[place]



                                                [place]  [phone]         [address]

 

        2020 Telephone         [facility]    [id number] [id number]

8096 Univers



        00:00:00 00:00:00                 Eugeniaong [place] 350.1.13.10         i

ty of



                                                [place] 4.2.7.2.686         [place]

[place]



                                                Professio [phone]         [address]                 

 

        2020 Telephone         [facility]    [id number] [id number]

8006 Univers



        00:00:00 00:00:00                 Wentong [place] 350.1.13.10         i

ty of



                                                [place] 4.2.7.2.686         [place]

[place]



                                                Profess[place] [phone]         [address]                 

 

        2020 Office          [facility]    1.2.840.114 [id number]

42 Univers



        13:04:03 13:58:29 Visit           Andrey [place] 350.1.13.10         i

ty of



                                                [place] 4.2.7.2.686         [place]

[place]



                                                Professio [phone]         [address]                 

 

        2019 Telephone         [facility]    1.2.836.032 [id number]

0998 Univers



        00:00:00 00:00:00                 Andrey Reaves 350.1.13.10         i

ty of



                                                [place] 4.2.7.2.686         [place]

[place]



                                                Profess[place] [phone]         [facility]     220             [facility]                 

 

        2019 Technician         2, Adc Lab [facility]    1.2.840.114 

[id number] Univers



        10:46:46 11:01:46 Visit           Andrey Viera 350.1.13.10     

    ity of



                                                [place] 4.2.7.2.686         [place]

[place]



                                                Profess[place] [phone]         [facility]     353             [facility]                 

 

        2019 Office          Maximiliano    [facility]    1.2.840.114 [id number]

23 [facility]



        09:40:57 10:28:08 Visit           Andrey Reaves 350.1.13.10         i

ty of



                                                [place] 4.2.7.2.686         [place]

[place]



                                                [place]fess[place] [phone]         [facility]     220             [facility]                 

 

        2019 Orders          Doctor  KELLY    1.2.840.114 [id number]

69 Univers



        00:00:00 00:00:00 Only            Unassigned, YADIEL   350.1.13.10       

  ity of



                                        [facility] [facility] 4.2.7.2.686         Lorenzo

as



                                                        [phone]         [address]

 

        2019-08-15 2019-08-15 Telephone         Maximiliano    [facility]    1.2.737.105 [id number]

8607 Univers



        00:00:00 00:00:00                 Andrey Reaves 350.1.13.10         i

ty of



                                                [place] 4.2.7.2.686         [place]

[place]



                                                Profess[place] [phone]         [facility]     220             [facility]                 

 

        2019 Telephone         Kristen   [facility]    1.2.079.471 [id number]

6838 Univers



        00:00:00 00:00:00                 Alyssia Reaves 350.1.13.10         i

ty of



                                        Juares   [place] 4.2.7.2.686         [place]

[place]



                                                Profess[place] [phone]         [facility]     220             [facility]                 

 

        2019 [facility]    1.2.840.114 [id number]

39 Univers



        00:00:00 00:00:00                 Alyssia Reaves 350.1.13.10         i

ty of



                                        Juares   [place] 4.2.7.2.686         Jesu So [phone]         Me

dical



                                                nal     [address]             [address]



                                                [facility]                 







Results







           Test Description Test Time  Test Comments Results    Result Comments 

Source









                    POCT HEMOGLOBIN A1C TEST 2022 18:13:00 









                      Test Item  Value      Reference Range Interpretation Comme

nts









             POCT HBA1C (test code = 4548-4) 10.7 %       4-6          A        

    

 

             Lab Interpretation (test code = 10592-9) Abnormal                  

             



General acute hospital HEMOGLOBIN A1C [id number] 18:13:00





             Test Item    Value        Reference Range Interpretation Comments

 

             POCT HBA1C (test code = 4548-4) 10.7 %       4-6          A        

    

 

             Lab Interpretation (test code = Abnormal                           

    



             81542-9)                                            



General acute hospital HEMOGLOBIN A1C [id number] 19:08:00





             Test Item    Value        Reference Range Interpretation Comments

 

             POCT HBA1C (test code = 4548-4) 5.8 %        4-6                   

    



General acute hospital HEMOGLOBIN A1C [id number] 19:08:00





             Test Item    Value        Reference Range Interpretation Comments

 

             POCT HBA1C (test code = 4548-4) 5.8 %        4-6                   

    



[facility]

## 2023-01-17 NOTE — P.PN
Date of Service: 01/17/23





Repeat lactate greater than 4, now meets criteria for septic shock although 

improved lactic acidosis is multifactorial.  Patient with pulmonary 

edema/pleural effusions on imaging, was hypoxic on room air, she did receive IV 

fluids she has not been hypotensive.  Will not give 30 cc/kg IV fluid bolus at 

this time given concern for volume overload in the setting of acute CHF 

exacerbation complicated with septic shock, DKA. Continue to trend lactic acid 

level.

## 2023-01-17 NOTE — EDPHYS
Physician Documentation                                                                           

 [facility]                                                                 

Name: Josette Faye                                                                                 

Age: 67 yrs                                                                                       

Sex: Female                                                                                       

: 1955                                                                                   

MRN: [id number]                                                                                   

Arrival Date: 2023                                                                          

Time: 18:54                                                                                       

Account#: [id number]                                                                            

Bed 8                                                                                             

Private MD:                                                                                       

ED Physician Walt Cordero                                                                         

HPI:                                                                                              

                                                                                             

19:16 This 67 yrs old Female presents to ER via EMS with complaints of Altered Mental Status, rn  

      all over pain.                                                                              

19:16 The patient presents with agitation, decreased responsiveness, disorientation. Onset:   rn  

      The symptoms/episode began/occurred at an unknown time. Possible causes: unknown.           

      Associated signs and symptoms: Pertinent positives: abdominal pain, confusion,              

      shortness of breath, Pertinent negatives: headache, seizure, vomiting. Current              

      symptoms: In the emergency department the patient's symptoms are unchanged from the         

      initial presentation. It is unknown whether or not the patient has had similar symptoms     

      in the past. The patient has not recently seen a physician. Pt brought in by EMS for        

      AMS. No trauma. Patient agitated and trying to get out of bed, not really talking to        

      us. Repeats "I need to get up". Reports pain all over but not more specific than that.      

      Denies vomiting/diarrhea. EMS stated glucose 600s. .                                        

                                                                                                  

Historical:                                                                                       

- Allergies:                                                                                      

18:57 No Known Allergies;                                                                     ld1 

- PMHx:                                                                                           

18:57 Diabetes - IDDM; Hypertension;                                                          ld1 

                                                                                                  

- Immunization history:: Adult Immunizations up to date.                                          

- Social history:: Smoking status: unknown.                                                       

- Unable to obtain history due to: altered mental status, patient being uncooperative.            

                                                                                                  

                                                                                                  

ROS:                                                                                              

19:16 Constitutional: Negative for fever, chills, and weight loss, Eyes: Negative for injury, rn  

      pain, redness, and discharge, Cardiovascular: Negative for chest pain, palpitations,        

      and edema, Respiratory: + sob Abdomen/GI: + abd pain : Negative for injury, bleeding,     

      discharge, and swelling, MS/Extremity: Negative for injury and deformity, Skin:             

      Negative for injury, rash, and discoloration, Neuro: + generalized weakness                 

                                                                                                  

Exam:                                                                                             

19:16 Constitutional:  Overweight female, seems restless, not really cooperative with exam.   rn  

      Head/Face:  Normocephalic, atraumatic. ENT:  dry MM Cardiovascular:  Tachycardic,           

      regular.  Respiratory:  + mild tachypnea, no retractions. Abdomen/GI:  Soft, non-tender     

      Skin:  Warm, dry MS/ Extremity:  Pulses equal, no cyanosis.  Neuro:  Awake and alert,       

      GCS 15, moves all 4 extremities.                                                            

20:16 ECG was reviewed by the Attending Physician.                                            rn  

                                                                                                  

Vital Signs:                                                                                      

18:55  / 107; Pulse 123; Resp 33; Temp 97.6(A); Pulse Ox 87% on R/A; Weight 55.79 kg;   ld1 

      Height 5 ft. 3 in. (160.02 cm);                                                             

19:27  / 107; Pulse 128; Resp 22; Pulse Ox 91% on R/A;                                  tw5 

20:03  / 105; Pulse 124; Resp 37; Pulse Ox 92% on 2 lpm NC;                             tw5 

20:54 Weight 86.18 kg;                                                                        tw5 

21:16 Temp 97.3;                                                                              tw5 

22:38  / 78; Pulse 126; Resp 31; Pulse Ox 94% ;                                         kd3 

20:54 Body Mass Index 33.66 (86.18 kg, 160.02 cm)                                             tw5 

19:27 patient is refusing to wear the oxygen at this time.                                    tw5 

20:03 patient keeps removing NC                                                               tw5 

                                                                                                  

MDM:                                                                                              

19:00 Patient medically screened.                                                             rn  

21:05 Differential Diagnosis: CVA, electrolyte abnormality, pneumonia, sepsis, TIA, UTI,      rn  

      volume depletion, DKA, hyperglycemia, dehydration, acute kidney failure. Data reviewed:     

      vital signs, nurses notes.                                                                  

21:05 Independent interpretation of the following test(s) in the Emergency Department EKG:    rn  

      See my EKG interpretation above X-Ray: My interpretation is CXR with interstitial           

      edema. . Counseling: I had a detailed discussion with the patient and/or guardian           

      regarding: the historical points, exam findings, and any diagnostic results supporting      

      the discharge/admit diagnosis, lab results, radiology results, the need for further         

      work-up and treatment in the hospital. Response to treatment: the patient's symptoms        

      have mildly improved after treatment, and as a result, I will admit patient. ED course:     

      Pt with UTI and DKA, insulin drip started and abx administered after blood cultures and     

      urine cultures obtained. Pt meets criteria for severe sepsis, without septic shock.         

      Fluids stopped during infusion of 2nd L bolus given CXR and CT chest shows pulmonary        

      edema and patient not in shock, does not require full 30ml/kg bolus at this time for        

      these reasons. Admitted to hospitalist service..                                            

21:09 Management of patient was discussed with the following: Hospitalist: Discussed case and rn  

      management with hospitalist service..                                                       

                                                                                                  

                                                                                             

19:10 Order name: Blood Culture Adult (2)                                                     rn  

                                                                                             

19:10 Order name: CBC with Diff; Complete Time: 20:08                                         rn  

                                                                                             

19:10 Order name: CMP; Complete Time: 20:08                                                   rn  

                                                                                             

19:10 Order name: Lactate w/ 2H reflex if indic.; Complete Time: 20:08                        rn  

                                                                                             

19:10 Order name: Protime (+inr); Complete Time: 20:08                                        rn  

                                                                                             

19:10 Order name: Ptt, Activated; Complete Time: 20:08                                        rn  

                                                                                             

19:10 Order name: Urine Culture                                                               rn  

                                                                                             

19:10 Order name: Urine Microscopic Only; Complete Time: 21:02                                rn  

                                                                                             

19:14 Order name: Ketone, Serum; Complete Time: 21:02                                         rn  

                                                                                             

19:14 Order name: BNP; Complete Time: 21:                                                   rn  

                                                                                             

19:23 Order name: COVID-19/FLU A+B; Complete Time: 21:02                                      tw5 

                                                                                             

19:59 Order name: Urine Dipstick-Ancillary; Complete Time: 20:08                              EDMS

                                                                                             

22:07 Order name: Glucose                                                                     kd3 

                                                                                             

22:07 Order name: CPK                                                                         la1 

                                                                                             

19:10 Order name: Chest Single View XRAY; Complete Time: 21:02                                rn  

                                                                                             

19:14 Order name: CT Head Brain wo Cont; Complete Time: 21:02                                 rn  

                                                                                             

20:13 Order name: CT Chest Abdomen Pelvis W/O Contrast; Complete Time: 21:02                  rn  

                                                                                             

22:17 Order name: Glucose, Ancillary Testing; Complete Time: 22:32                            EDMS

                                                                                             

22:49 Order name: Glucose Level; Complete Time: 22:59                                         EDMS

                                                                                             

22:49 Order name: Creatine Phosphokinase; Complete Time: 22:59                                EDMS

                                                                                             

22:49 Order name: Lactate Sepsis 2 HR Follow-up; Complete Time: 22:59                         EDMS

                                                                                             

23:04 Order name: ABG: OK FOR VBG                                                             la1 

                                                                                             

23:21 Order name: Glucose, Ancillary Testing                                                  EDMS

                                                                                             

23:24 Order name: ABG Arterial Blood Gas                                                      [facility]

                                                                                             

19:10 Order name: EKG; Complete Time: 19:10                                                   rn  

                                                                                             

19:10 Order name: Cardiac monitoring; Complete Time:                                     rn  

                                                                                             

19:10 Order name: EKG - Nurse/Tech; Complete Time: :                                      rn  

                                                                                             

19:10 Order name: IV Saline Lock - Large Bore; Complete Time: :                           rn  

                                                                                             

19:10 Order name: Labs collected and sent; Complete Time:                                rn  

                                                                                             

19:10 Order name: O2 Per Protocol; Complete Time: :                                       rn  

                                                                                             

19:10 Order name: O2 Sat Monitoring; Complete Time: :                                     rn  

                                                                                             

19:10 Order name: Urine Dipstick-Ancillary (obtain specimen); Complete Time:             rn  

                                                                                             

19:10 Order name: Vital Signs; Complete Time: :59                                           rn  

                                                                                                  

EC:16 Rate is 125 beats/min. Rhythm is regular. QRS Axis is Normal. KY interval is normal.    rn  

      QRS interval is normal. QT interval is normal. No Q waves. T waves are Normal. No ST        

      changes noted. Clinical impression: Sinus tachycardia. Interpreted by me. Reviewed by       

      me.                                                                                         

                                                                                                  

Administered Medications:                                                                         

21:02 Discontinued: NS 0.9% 1000 ml IV at 1000 ml once                                        rn  

19:59 Drug: NS 0.9% 1000 ml Route: IV; Rate: 1000 ml; Site: right antecubital;                tw5 

21:21 Follow up: Response: No adverse reaction; IV Status: Completed infusion; IV Intake:     tw5 

      1000ml                                                                                      

20:57 Drug: Insulin Drip - (Insulin Regular Human 100 units, NS 0.9% 100 ml) {Co-Signature:   tw 

      fabio (Nataly Crowley RN).} Route: IV; Rate: calculated rate; Site: right antecubital;         

23:49 Follow up: IV Status: Infusion continued                                                kd3 

20:58 Drug: NS 0.9% 1000 ml Route: IV; Rate: 1000 ml; Site: right antecubital;                tw5 

20:58 Drug: Insulin Regular Human 10 units {Co-Signature: fabio (Nataly Crowley RN).} Route:     tw5 

      IVP; Site: right antecubital;                                                               

22:15 Follow up: Response: No adverse reaction                                                tw5 

21:21 Drug: Rocephin (cefTRIAXone) 1 grams Route: IV; Rate: calculated rate; Site: right      tw5 

      antecubital;                                                                                

21:21 Follow up: Response: No adverse reaction; IV Status: Completed infusion                 tw5 

22:15 Drug: Zofran (Ondansetron) 4 mg Route: IVP; Site: right antecubital;                    tw5 

23:06 Follow up: Response: No adverse reaction                                                tw5 

23:06 Drug: Lasix (furosemide) 40 mg Route: IVP; Site: right antecubital;                     tw5 

23:49 Follow up: Response: No adverse reaction                                                kd3 

                                                                                                  

                                                                                                  

Disposition:                                                                                      

21:05 Critical Care:.                                                                         rn  

                                                                                                  

Disposition Summary:                                                                              

23 21:09                                                                                    

Hospitalization Ordered                                                                           

      Hospitalization Status: Inpatient Admission                                             rn  

      Location: Intensive Care Unit                                                           rn  

      Condition: Fair                                                                         rn  

      Problem: new                                                                            rn  

      Symptoms: have improved                                                                 rn  

      Bed/Room Type: Standard                                                                 rn  

      Provider: Lorne Cordero(23 21:32)                                                la1 

      Room Assignment: -(23 22:56)                                                     mw2 

      Diagnosis                                                                                   

        - Severe sepsis without septic shock                                                  rn  

        - UTI/ Urinary tract infection, site not specified                                    rn  

        - Altered mental status, unspecified                                                  rn  

        - Diabetes mellitus due to underlying condition with ketoacidosis without coma        rn  

      Forms:                                                                                      

        - Medication Reconciliation Form                                                      rn  

        - SBAR form                                                                           rn  

Critical care time excluding procedures:                                                          

21:05 Critical care time: Bedside Care: 35 minutes. Total time: 35 minutes                    rn  

                                                                                                  

Signatures:                                                                                       

Dispatcher [employer]                           EDMS                                                 

Walt Cordero MD MD rn Attema, Lee, LINP-C                      FNP-Cla1                                                  

Mateo Lara                                mw2                                                  

Cinthya Deshpande RN                     RN   beto1                                                  

Elizabeth Hall                                tw5                                                  

Earline Caballero                          RN   kd3                                                  

Nataly Crowley RN                            bb                                                   

                                                                                                  

Corrections: (The following items were deleted from the chart)                                    

19:22 19:10 Accucheck ordered. rn                                                             tw5 

20:17 19:15 Chest Abdomen Pelvis W Con+CT.RAD.BRZ ordered. [facility]

21:32 21:09 Naseem Lawler rn                                                                  la1 

22:56 21:09 rn                                                                                mw2 

                                                                                                  

**************************************************************************************************

## 2023-01-17 NOTE — RAD REPORT
EXAM DESCRIPTION:  RAD - Chest Single View - 1/17/2023 8:11 pm

 

CLINICAL HISTORY:  AMS

 

COMPARISON:  Chest Single View dated 9/27/2022; Chest Single View dated 1/4/2022; Chest Single View d
ated 12/31/2021; Chest Single View dated 1/23/2021

 

FINDINGS:  Lines: None.

Lungs: Diffuse prominence of the pulmonary interstitium.

Pleural: No significant pleural effusions or pneumothorax.

Cardiac: Cardiomegaly.

Mediastinum: Within normal limits.

Bones: No acute fractures.

Other: None

 

IMPRESSION:  Findings most likely representing pulmonary edema.

## 2023-01-17 NOTE — ER
Nurse's Notes                                                                                     

 [facility]                                                                 

Name: Josette Faye                                                                                 

Age: 67 yrs                                                                                       

Sex: Female                                                                                       

: 1955                                                                                   

MRN: [id number]                                                                                   

Arrival Date: 2023                                                                          

Time: 18:54                                                                                       

Account#: [id number]                                                                            

Bed 8                                                                                             

Private MD:                                                                                       

Diagnosis: Severe sepsis without septic shock;UTI/ Urinary tract infection, site not              

  specified;Altered mental status, unspecified;Diabetes mellitus due to underlying                

  condition with ketoacidosis without coma                                                        

                                                                                                  

Presentation:                                                                                     

                                                                                             

18:55 Chief complaint: EMS states: toned out to pt home for "pain all over." Upon arrival to  [facility] 

      ER pt SpO2 87% RA - AMS - can not located where she is hurting. Coronavirus screen: At      

      this time, the client does not indicate any symptoms associated with coronavirus-19.        

      Ebola Screen: No symptoms or risks identified at this time. Initial Sepsis Screen: Does     

      the patient meet any 2 criteria? Does the patient have a suspected source of infection?     

      Yes:. Risk Assessment: RR > 20 per min. Altered Mental Status. HR > 90 bpm. Yes No.         

      Patient's initial sepsis screen is negative. Do you want to hurt yourself or someone        

      else? Patient reports no desire to harm self or others. Onset of symptoms was 2023.                                                                                   

18:55 Method Of Arrival: EMS: [facility]                                                    [facility] 

18:55 Acuity: AMPARO 2                                                                           ld1 

                                                                                                  

Triage Assessment:                                                                                

18:57 General: Appears in no apparent distress. uncomfortable, Behavior is agitated, anxious, ld1 

      inappropriate for age. Pain: Complains of pain in EMS toned out for pain - unable to        

      verbalize where pain is. EENT: No signs and/or symptoms were reported regarding the         

      EENT system. Neuro: Level of Consciousness is awake, confused, lethargic, Oriented to       

      person. Cardiovascular: Capillary refill < 3 seconds Patient's skin is warm and dry.        

      Rhythm is sinus tachycardia. Respiratory: Airway is patent Respiratory effort is even,      

      labored. GI: Abdomen is round non-distended. : No signs and/or symptoms were reported     

      regarding the genitourinary system. Derm: No signs and/or symptoms reported regarding       

      the dermatologic system. Musculoskeletal: No signs and/or symptoms reported regarding       

      the musculoskeletal system.                                                                 

                                                                                                  

Historical:                                                                                       

- Allergies:                                                                                      

18:57 No Known Allergies;                                                                     ld1 

- PMHx:                                                                                           

18:57 Diabetes - IDDM; Hypertension;                                                          ld1 

                                                                                                  

- Immunization history:: Adult Immunizations up to date.                                          

- Social history:: Smoking status: unknown.                                                       

- Unable to obtain history due to: altered mental status, patient being uncooperative.            

                                                                                                  

                                                                                                  

Screenin:27 [facility] ED Fall Risk Assessment (Adult) Confusion or Disorientation Yes (5 pts). Abuse tw5 

      screen: Denies threats or abuse. Denies injuries from another. Nutritional screening:.      

      Tuberculosis screening: No symptoms or risk factors identified.                             

                                                                                                  

Assessment:                                                                                       

19:00 Reassessment: Code sepsis called at 1855.                                               ld1 

19:19 General: Patient is yelling " PLEASE HELP ME! I NEED TO GET UP." 10 in prior patient    tw5 

      was refusing to answer any questions and only moaning. . Pain:. Neuro: Level of             

      Consciousness is confused, lethargic. Cardiovascular: Rhythm is sinus tachycardia.          

      Respiratory: Airway is patent Trachea midline Respiratory pattern is tachypnea.             

19:25 General: DaughterLilibeth states " Her  has been in the hospital for the past two   tw5 

      months. This is how I found her at home. The last time we saw her normal was on .     

      Last this she acted just like this last year when she was diagnosed with her OPCA,          

      brain is shrinking. She is mean she is going to act defiant.".                              

20:02 General: lab called for second set of blood cutlures..                                  tw5 

20:03 General: Appears uncomfortable, Behavior is agitated, combative. Neuro: Level of        tw5 

      Consciousness is confused.                                                                  

21:02 General: Patient is confused and groaning. Patient keeps pulling off tele wires and NC. tw5 

      CT staff stated that she tried crawling out of bed. Provider notifed..                      

21:16 General: Patient keeps pulling off her gown, sheets, leads, and NC.                     tw5 

21:16 Derm: Rash noted that is red, on groin and right femoral area blow by oxygen attempted  tw5 

      with NRB.                                                                                   

21:59 General: Yelling incoherently.                                                          tw5 

                                                                                                  

Vital Signs:                                                                                      

18:55  / 107; Pulse 123; Resp 33; Temp 97.6(A); Pulse Ox 87% on R/A; Weight 55.79 kg;   ld1 

      Height 5 ft. 3 in. (160.02 cm);                                                             

19:27  / 107; Pulse 128; Resp 22; Pulse Ox 91% on R/A;                                  tw5 

20:03  / 105; Pulse 124; Resp 37; Pulse Ox 92% on 2 lpm NC;                             tw5 

20:54 Weight 86.18 kg;                                                                        tw5 

21:16 Temp 97.3;                                                                              tw5 

22:38  / 78; Pulse 126; Resp 31; Pulse Ox 94% ;                                         kd3 

20:54 Body Mass Index 33.66 (86.18 kg, 160.02 cm)                                             tw5 

19:27 patient is refusing to wear the oxygen at this time.                                    tw5 

20:03 patient keeps removing NC                                                               tw5 

                                                                                                  

ED Course:                                                                                        

18:54 Patient arrived in ED.                                                                  ld1 

18:57 Triage completed.                                                                       ld1 

18:57 Arm band placed on right wrist.                                                         ld1 

19:00 Walt Cordero MD is Attending Physician.                                                rn  

19:00 Patient has correct armband on for positive identification. Placed in gown. Bed in low  ld1 

      position. Call light in reach. Side rails up X2. Cardiac monitor on. Pulse ox on. NIBP      

      on. Door closed. Noise minimized. Warm blanket given.                                       

19:19 Elizabeth Hall is Primary Nurse.                                                         tw5 

19:19 Inserted saline lock: 20 gauge in right antecubital area, using aseptic technique.      tw5 

      Blood collected.                                                                            

19:21 Initial lab(s) drawn, by me, sent to lab. First set of blood cultures drawn by me.      tw5 

19:21 EKG done, by EKG tech. reviewed by Walt Cordero MD.                                      tw5 

19:22 CBC with Diff Sent.                                                                     tw5 

19:22 CMP Sent.                                                                               tw5 

19:22 Protime (+inr) Sent.                                                                    tw5 

19:22 Lactate w/ 2H reflex if indic. Sent.                                                    tw5 

19:22 Ptt, Activated Sent.                                                                    tw5 

19:22 BNP Sent.                                                                               tw5 

19:34 COVID-19/FLU A+B Sent.                                                                  tw5 

19:34 Ketone, Serum Sent.                                                                     tw5 

19:59 COVID-19/FLU A+B Sent.                                                                  tw 

19:59 BNP Sent.                                                                               tw5 

19:59 CMP Sent.                                                                               tw5 

19:59 Lactate w/ 2H reflex if indic. Sent.                                                    tw 

19:59 Urine Culture Sent.                                                                     tw 

19:59 Urine Microscopic Only Sent.                                                            tw5 

20:02 Missed attempt(s): 22 gauge in right hand. Bleeding controlled, band aid applied,       tw5 

      catheter tip intact.                                                                        

20:02 Missed attempt(s): 22 gauge in left hand. Bleeding controlled, band aid applied,        tw5 

      catheter tip intact.                                                                        

20:03 Notified ED physician of a critical lab result(s). 720 glucose, 13 bicarb, 5.8          bb  

      potassium Dr Cordero notified.                                                                

20:13 Chest Single View XRAY In Process Unspecified.                                          EDMS

20:41 CT Head Brain wo Cont In Process Unspecified.                                           EDMS

20:42 CT Chest Abdomen Pelvis W/O Contrast In Process Unspecified.                            EDMS

21:03 Second set of blood cultures drawn by lab staff.                                        tw5 

21:08 Naseem Lawler MD is Hospitalizing Provider.                                            rn  

21:32 Lorne Cordero MD is Hospitalizing Provider.                                           la1 

22:15 CPK Sent.                                                                               tw5 

22:15 Glucose Sent.                                                                           tw5 

22:49 Notified ED physician of a critical lab result(s). lactate 4.9 Dr Cordero notified.       bb  

23:06 Patient admitted, IV remains in place.                                                  tw5 

23:06 No provider procedures requiring assistance completed.                                  tw5 

23:07 [place] cath inserted, using sterile technique, 16 Fr., by me, balloon inflated, to       tw5 

      gravity drainage, returned cloudy urine.                                                    

                                                                                                  

Administered Medications:                                                                         

21:02 Discontinued: NS 0.9% 1000 ml IV at 1000 ml once                                        rn  

19:59 Drug: NS 0.9% 1000 ml Route: IV; Rate: 1000 ml; Site: right antecubital;                tw5 

21:21 Follow up: Response: No adverse reaction; IV Status: Completed infusion; IV Intake:     tw5 

      1000ml                                                                                      

20:57 Drug: Insulin Drip - (Insulin Regular Human 100 units, NS 0.9% 100 ml) {Co-Signature:   tw 

      fabio (Nataly Crowley RN).} Route: IV; Rate: calculated rate; Site: right antecubital;         

23:49 Follow up: IV Status: Infusion continued                                                kd3 

20:58 Drug: NS 0.9% 1000 ml Route: IV; Rate: 1000 ml; Site: right antecubital;                tw5 

20:58 Drug: Insulin Regular Human 10 units {Co-Signature: fabio (Nataly Crowley RN).} Route:     tw5 

      IVP; Site: right antecubital;                                                               

22:15 Follow up: Response: No adverse reaction                                                tw5 

21:21 Drug: Rocephin (cefTRIAXone) 1 grams Route: IV; Rate: calculated rate; Site: right      tw5 

      antecubital;                                                                                

21:21 Follow up: Response: No adverse reaction; IV Status: Completed infusion                 tw5 

22:15 Drug: Zofran (Ondansetron) 4 mg Route: IVP; Site: right antecubital;                    tw5 

23:06 Follow up: Response: No adverse reaction                                                tw5 

23:06 Drug: Lasix (furosemide) 40 mg Route: IVP; Site: right antecubital;                     tw5 

23:49 Follow up: Response: No adverse reaction                                                kd3 

                                                                                                  

                                                                                                  

Medication:                                                                                       

19:19 VIS not applicable for this client.                                                     tw5 

                                                                                                  

Intake:                                                                                           

21:21 IV: 1000ml; Total: 1000ml.                                                              tw5 

                                                                                                  

Outcome:                                                                                          

21:09 Decision to Hospitalize by Provider.                                                    rn  

23:07 Admitted to ICU                                                                         tw5 

23:07 Condition: stable                                                                           

23:07 Instructed on the need for admit.                                                           

23:16 Admitted to ICU accompanied by nurse, via stretcher, room 8, with oxygen, on monitor,   tw5 

      with chart, Report called to  report called to zack                                       

23:49 Patient left the ED.                                                                    kd3 

                                                                                                  

Signatures:                                                                                       

Dispatcher [employer]                           EDNataly Wesley RN RN bb Nieto, Roman, MD MD rn Attema, Lee, LINP-C                      FNP-Cla1                                                  

Zack Deshpande RN RN ld1                                                  

Elizabeth Hall                                tw5                                                  

Earline Caballero RN RN   kd3                                                  

Nataly mccarthy                                                   

                                                                                                  

Corrections: (The following items were deleted from the chart)                                    

19:00 19:00 [facility] ED Fall Risk Assessment (Adult) ld1                                      ld1 

                                                                                                  

**************************************************************************************************

## 2023-01-17 NOTE — RAD REPORT
EXAM DESCRIPTION:  CTChest Abd Pelvis Wo Con - 1/17/2023 8:40 pm

 

CLINICAL HISTORY:

AMS, sob, abd pain

 

COMPARISON:  Head Brain Wo Cont dated 1/17/2023; Chest Single View dated 1/17/2023; CT ABDOMEN PELVIS
 WO CONTRAST dated 3/20/2015

 

TECHNIQUE:  CT of the chest, abdomen, and pelvis was performed.

 

All CT scans are performed using dose optimization technique as appropriate and may include automated
 exposure control or mA/KV adjustment according to patient size.

 

FINDINGS:  Thorax:

Chest Wall: No abnormal mass

Lungs: Interlobular septal thickening. Motion limited. Atelectasis as a result of the effusions.

Pleura: Small bilateral effusions.

Shelli/Mediastinum: No lymphadenopathy.

Aorta/Pulmonary Arteries: Unremarkable

Heart: Normal size. Multi-vessel coronary artery disease.

 

Abdomen/Pelvis:

Liver: No acute abnormality or suspicious lesions.

Biliary: No biliary ductal dilatation. Cholecystectomy

Stomach: Surgical changes at the stomach.

Duodenum: No significant focal abnormality.

Pancreas: No significant abnormality.

Spleen: No significant abnormality.

Adrenal: No suspicious lesions.

Kidney/ureter: No hydronephrosis. No renal calculi.

Retroperitoneum: No retroperitoneal adenopathy.

Vascular: No aneurysm. Atherosclerosis.

Bowel: Moderate stool in the rectum. Normal appendix. No bowel obstruction..

Peritoneum: No ascites or free air.

Bladder: Grossly unremarkable.

Reproductive: No adnexal masses.

 

Bones: No acute fracture. Mild scattered degenerative changes are present spine.

Other: n/a

 

IMPRESSION:  1. Findings most likely representing pulmonary edema within the lungs.

2. No acute intra-abdominal abnormality.

## 2023-01-17 NOTE — RAD REPORT
EXAM DESCRIPTION:  CT - Head Brain Wo Cont - 1/17/2023 8:40 pm

 

CLINICAL HISTORY:  AMS

 

COMPARISON:  Head Brain Wo Cont dated 9/27/2022; Head Brain Wo Cont dated 12/31/2021

 

TECHNIQUE:  All CT scans are performed using dose optimization technique as appropriate and may inclu
de automated exposure control or mA/KV adjustment according to patient size.

 

FINDINGS:  No intracranial hemorrhage, hydrocephalus or extra-axial fluid collection.No areas of brai
n edema or evidence of midline shift.

 

The paranasal sinuses and mastoids are clear. The calvarium is intact.

 

IMPRESSION:  No acute intracranial abnormality.

## 2023-01-18 LAB
ANISOCYTOSIS BLD QL: (no result)
BUN BLD-MCNC: 52 MG/DL (ref 7–18)
BUN BLD-MCNC: 55 MG/DL (ref 7–18)
BUN BLD-MCNC: 55 MG/DL (ref 7–18)
BUN BLD-MCNC: 56 MG/DL (ref 7–18)
BUN BLD-MCNC: 57 MG/DL (ref 7–18)
BUN BLD-MCNC: 58 MG/DL (ref 7–18)
GLUCOSE SERPLBLD-MCNC: 211 MG/DL (ref 74–106)
GLUCOSE SERPLBLD-MCNC: 234 MG/DL (ref 74–106)
GLUCOSE SERPLBLD-MCNC: 293 MG/DL (ref 74–106)
GLUCOSE SERPLBLD-MCNC: 302 MG/DL (ref 74–106)
GLUCOSE SERPLBLD-MCNC: 308 MG/DL (ref 74–106)
GLUCOSE SERPLBLD-MCNC: 616 MG/DL (ref 74–106)
HCT VFR BLD CALC: 32.5 % (ref 36–45)
HDLC SERPL-MCNC: 41 MG/DL (ref 40–60)
LDLC SERPL CALC-MCNC: 68 MG/DL (ref ?–130)
LYMPHOCYTES # SPEC AUTO: 0.8 K/UL (ref 0.7–4.9)
MAGNESIUM SERPL-MCNC: 2.3 MG/DL (ref 1.6–2.4)
MCV RBC: 83.7 FL (ref 80–100)
MORPHOLOGY BLD-IMP: (no result)
PMV BLD: 8.9 FL (ref 7.6–11.3)
POIKILOCYTOSIS BLD QL SMEAR: SLIGHT
POTASSIUM SERPL-SCNC: 4.5 MMOL/L (ref 3.5–5.1)
POTASSIUM SERPL-SCNC: 4.5 MMOL/L (ref 3.5–5.1)
POTASSIUM SERPL-SCNC: 4.9 MMOL/L (ref 3.5–5.1)
POTASSIUM SERPL-SCNC: 4.9 MMOL/L (ref 3.5–5.1)
POTASSIUM SERPL-SCNC: 5.1 MMOL/L (ref 3.5–5.1)
POTASSIUM SERPL-SCNC: 5.1 MMOL/L (ref 3.5–5.1)
RBC # BLD: 3.88 M/UL (ref 3.86–4.86)
TOXIC GRANULES BLD QL SMEAR: PRESENT
TROPONIN I SERPL HS-MCNC: (no result) PG/ML (ref ?–58.9)
TROPONIN I SERPL HS-MCNC: (no result) PG/ML (ref ?–58.9)

## 2023-01-18 RX ADMIN — HUMAN INSULIN SCH MLS: 100 INJECTION, SOLUTION SUBCUTANEOUS at 12:45

## 2023-01-18 RX ADMIN — MORPHINE SULFATE PRN MG: 2 INJECTION, SOLUTION INTRAMUSCULAR; INTRAVENOUS at 23:21

## 2023-01-18 RX ADMIN — HEPARIN SODIUM PRN UNIT: 1000 INJECTION, SOLUTION INTRAVENOUS; SUBCUTANEOUS at 14:27

## 2023-01-18 RX ADMIN — FLUCONAZOLE, SODIUM CHLORIDE SCH MLS: 2 INJECTION INTRAVENOUS at 20:49

## 2023-01-18 RX ADMIN — HEPARIN SODIUM PRN UNIT: 1000 INJECTION, SOLUTION INTRAVENOUS; SUBCUTANEOUS at 14:28

## 2023-01-18 RX ADMIN — HEPARIN SODIUM PRN UNIT: 1000 INJECTION, SOLUTION INTRAVENOUS; SUBCUTANEOUS at 14:26

## 2023-01-18 RX ADMIN — SODIUM CHLORIDE SCH MLS: 9 INJECTION, SOLUTION INTRAVENOUS at 23:22

## 2023-01-18 RX ADMIN — SODIUM CHLORIDE SCH MLS: 9 INJECTION, SOLUTION INTRAVENOUS at 00:11

## 2023-01-18 RX ADMIN — DEXTROSE AND SODIUM CHLORIDE SCH MLS: 5; .45 INJECTION, SOLUTION INTRAVENOUS at 20:07

## 2023-01-18 RX ADMIN — MORPHINE SULFATE PRN MG: 2 INJECTION, SOLUTION INTRAMUSCULAR; INTRAVENOUS at 12:27

## 2023-01-18 RX ADMIN — HUMAN INSULIN SCH MLS: 100 INJECTION, SOLUTION SUBCUTANEOUS at 00:11

## 2023-01-18 RX ADMIN — DEXTROSE AND SODIUM CHLORIDE SCH MLS: 5; .45 INJECTION, SOLUTION INTRAVENOUS at 06:34

## 2023-01-18 NOTE — P.CNS
Date of Consult: 01/18/23


Reason for Consult: Renal insufficiency


Requesting Physician: Keyon Lloyd


Chief Complaint: DKA, severe sepsis, UTI


History of Present Illness: 





Pt is a 67-year-old female  female whose history is being obtained 

through chart review as she is altered and confused and unable to provide a 

history. She has a history of insulin-dependent diabetes with unspecified 

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 

deficits, chronic kidney disease Stage IV, hypertension and other who presented 

yesterday to the emergency department for altered mental status.  Pt was 

agitated and restless on admission. Labs on admission demonstrated DKA, 

hyperkalemia, leukocytosis, abnormal urine and CT chest abdomen pelvis without 

contrast was performed which revealed pulmonary edema. Pt admitted to ICU and 

remains on Insulin drip, heparin for NSTEMI and IVF. Pt remains restless, denies

abd pain or nausea or vomiting but complaints of patient but is not able to 

elaborate.


Allergies





No Known Drug Allergies Allergy (Verified 03/20/15 17:12)


   Unknown





Home Medications: 








Amlodipine [Norvasc*] 5 mg PO DAILY 01/02/22 


Atorvastatin Calcium [Lipitor*] 20 mg PO BEDTIME 01/02/22 


Furosemide [Lasix] 40 mg PO BIDL 01/02/22 


Gabapentin 300 mg PO BID 01/02/22 


Insulin Aspart [Novolog Flexpen] 20 unit SQ TIDWM 01/02/22 


Insulin Degludec [Tresiba] 60 unit SQ DAILY 01/02/22 


Spironolactone 50 mg PO DAILY 01/02/22 


Atorvastatin Calcium [Lipitor] 40 mg PO BEDTIME  tab 01/07/22 


Calcitrol [Rocaltrol*] 0.5 mcg PO DAILY  cap 01/07/22 


Cholecalciferol (Vitamin D3) [Vitamin D 1000 Iu Tab*] 5,000 unit PO DAILY  tab 

01/07/22 


Na Bicarb Tab [Sodium Bicarb 325 MG Tab*] 650 mg PO BIDWM  tab 01/07/22 


predniSONE [Deltasone*] 10 mg PO BID  tab 01/07/22 








- Past Medical/Surgical History


Diabetic: Yes


-: Hypertension


-: Diabetes mellitus type 2-insulin dependent


-: CKD 4


-: CHF


-: Olivopontocerebellar atrophy


-: stomach stapled


-: gastric bypass


-: tubes tied


-: cholectectomy


Psychosocial/ Personal History: Patient retired, lives with family





- Family History


  ** Father


Medical History: Cancer


Notes: altered mental status unable to give history





  ** Mother


Medical History: Hypertension, Diabetes





  ** Brother


Medical History: Diabetes





  ** Sister


Medical History: Diabetes





- Social History


Smoking Status: Unknown if ever smoked


Alcohol use: No


CD- Drugs: No


Caffeine use: Yes


Place of Residence: Home





Review of Systems


is unable to be obtained





Physical Examination














Temp Pulse Resp BP Pulse Ox


 


 98.3 F   124 H  18   174/70 H  95 


 


 01/18/23 04:00  01/18/23 06:00  01/18/23 06:00  01/18/23 06:00  01/18/23 06:00








General: Disheveled, Mild distress, Confused


HEENT: Atraumatic, Normocephalic, Other (NC)


Neck: Supple


Respiratory: Normal air movement, Other (No sig rhonchi)


Cardiovascular: Other (Tachy but mostly regular, trace distal edema)


Gastrointestinal: Soft and benign, Non-distended, No tenderness, No rebound


Musculoskeletal: No contractures, No erythema, Tenderness


Integumentary: No rashes


Neurological: Other (Altered, confused, restless, moves ext spont, no tremors)


Laboratory Data (last 24 hrs)





01/17/23 19:15: PT 11.5, INR 1.05, APTT 31.2


01/17/23 19:15: Sodium 136, Potassium 5.8 H*, BUN 51 H, Creatinine 2.77 H, 

Glucose 720 H*, Total Bilirubin 1.5 H, AST 37, ALT 18, Alkaline Phosphatase 108


01/17/23 19:15: WBC 17.20 H, Hgb 11.6 L, Hct 38.8, Plt Count 410 H





Conclusions/Impression: 


A/P





1. Stage 1 RADHA


2. Underlying CKD Stage IV 2nd to DM/HTN


3. Metab acidosis with AG/lactic acidosis on admission


4. Abnormal findings in urine, unspecified.


5. Pyuria


6. SIRS, possible sepsis 2nd to unspecified organism. Leukocytosis


7. DKA on admission


8. CHF unspecified


9. NSTEMI


10. Acute pulm edema


11. Hyperkalemia on admission





-Renal function tests marginally improved/stable with gentle hydration, non 

anuric, cont to monitor closely


-AG, bicarb deficit improved. Lactic acidosis likely Type A and decreased 

clearance related. Bicarb fluids not needed at this time, ok to cont current 

D51/2NS while on Insulin drip and BG ~ 250 or lower.


-Hyperkalemia resolved with intra cellular shifting with Insulin, cont to 

monitor


-Will f/u Cardiology reccs, plans for echo reported. Pt on heparin drip. Pt's 

risk for NANI is high at this time, possibly as high as 57.2% per Saeed with 

12.6% risk for post PCI NANI requiring HD however in the setting of possible 

sepsis, DKA, AMS and other likely medical management for now.


-Hold any ALE inhibitors or aldosterone antagonists


-F/u BCx & UCx, empiric ABx per primary team. UA showed some yeast on automated 

analysis, consider antifungal coverage if appropriate.


-Dose meds for reduced CrCl





Ramsey Alford MD, RABIA

## 2023-01-18 NOTE — P.PN
Date of Service: 01/18/23





Subjective:


responds yes/no


groans intermittently


denies pain, then reports mild abdominal discomfort


no nausea/vomiting


not following commands exactly





ROS: 


10 point ROS unable to accurately be obtained





Physical exam


GEN: uncomfortable appearing


HEENT: normal conjunctiva, PERRL


CV:  irregular rhythm, tachycardic, no edema


Pulm:  nonlabored, diminished bilaterally


ABD: Soft, nontender, nondistended


Integumentary: No rashes


Neuro: AOx1, moves all extremities








Problem List


Diabetes mellitus type 2insulin-dependent with DKA


Severe sepsis / Septic shock secondary to UTI


Acute metabolic encephalopathy secondary to sepsis/DKA


Acute on chronic diastolic congestive heart failure


NSTEMI


CKD 4 with hyperkalemia


History of olivopontocerebellar atrophy








Diabetes mellitus type 2insulin-dependent with DKA


continue insulin drip per protocol


A1c >14


anion gap improved


accucheks





Septic shock / Severe sepsis secondary to UTI


SIRS+, lactate >4 - likely more in part due to DKA / hypoxia vs infection


30cc/kg bolus not given due to patient's volume status / h/o CHF, bilateral 

effusions and pulm edema noted on CT


continue empiric antibiotics - cefepime/vanc


f/u cultures





Acute metabolic encephalopathy secondary to sepsis/DKA


Continue as above, olivopontocerebellar atrophy also likely contributing.





NSTEMI


Acute on chronic diastolic congestive heart failure


pulm edema, b/l effusion noted on CT; received IV Lasix in ED


monitor closely in ICU


cardiology consulted, trop trending up


   continue heparin drip


echo ordered





CKD 4 with hyperkalemia 


suspect RADHA, unclear what patient's baseline is; secondary to DKA/sepsis


nephrology consulted 


monitor renal function closely


strict i/o's





History of olivopontocerebellar atrophy


Daughter reports patient alert, oriented x4, ambulatory baseline.  Helps care 

for her  who is currently at [facility] recovering from liver 

transplant.








VTE: heparin drip


Code: Full


Dispo: continue icu level of care





Time Spent Managing Pts Care (In Minutes): 55

## 2023-01-19 LAB
BUN BLD-MCNC: 55 MG/DL (ref 7–18)
BUN BLD-MCNC: 56 MG/DL (ref 7–18)
C DIFF GDH + TOXINS A+B STL QL IA.RAPID: (no result)
GLUCOSE SERPLBLD-MCNC: 161 MG/DL (ref 74–106)
GLUCOSE SERPLBLD-MCNC: 198 MG/DL (ref 74–106)
HCT VFR BLD CALC: 31.2 % (ref 36–45)
LYMPHOCYTES # SPEC AUTO: 1.1 K/UL (ref 0.7–4.9)
MAGNESIUM SERPL-MCNC: 2.2 MG/DL (ref 1.6–2.4)
MCV RBC: 84.9 FL (ref 80–100)
PMV BLD: 9.3 FL (ref 7.6–11.3)
POTASSIUM SERPL-SCNC: 4.5 MMOL/L (ref 3.5–5.1)
POTASSIUM SERPL-SCNC: 4.8 MMOL/L (ref 3.5–5.1)
RBC # BLD: 3.67 M/UL (ref 3.86–4.86)

## 2023-01-19 RX ADMIN — HUMAN INSULIN SCH MLS: 100 INJECTION, SOLUTION SUBCUTANEOUS at 23:01

## 2023-01-19 RX ADMIN — DEXTROSE AND SODIUM CHLORIDE SCH MLS: 5; .45 INJECTION, SOLUTION INTRAVENOUS at 10:05

## 2023-01-19 RX ADMIN — HEPARIN SODIUM PRN UNIT: 1000 INJECTION, SOLUTION INTRAVENOUS; SUBCUTANEOUS at 10:17

## 2023-01-19 RX ADMIN — DEXTROSE AND SODIUM CHLORIDE SCH MLS: 5; .45 INJECTION, SOLUTION INTRAVENOUS at 21:45

## 2023-01-19 RX ADMIN — SODIUM CHLORIDE SCH MLS: 9 INJECTION, SOLUTION INTRAVENOUS at 23:15

## 2023-01-19 RX ADMIN — FLUCONAZOLE, SODIUM CHLORIDE SCH MLS: 2 INJECTION INTRAVENOUS at 20:32

## 2023-01-19 RX ADMIN — HEPARIN SODIUM AND DEXTROSE PRN MLS: 5000; 5 INJECTION INTRAVENOUS at 01:06

## 2023-01-19 RX ADMIN — MORPHINE SULFATE PRN MG: 2 INJECTION, SOLUTION INTRAMUSCULAR; INTRAVENOUS at 23:40

## 2023-01-19 RX ADMIN — MORPHINE SULFATE PRN MG: 2 INJECTION, SOLUTION INTRAMUSCULAR; INTRAVENOUS at 04:07

## 2023-01-19 RX ADMIN — MORPHINE SULFATE PRN MG: 2 INJECTION, SOLUTION INTRAMUSCULAR; INTRAVENOUS at 13:14

## 2023-01-19 RX ADMIN — DEXTROSE AND SODIUM CHLORIDE SCH: 5; .45 INJECTION, SOLUTION INTRAVENOUS at 01:46

## 2023-01-19 RX ADMIN — HEPARIN SODIUM AND DEXTROSE PRN MLS: 5000; 5 INJECTION INTRAVENOUS at 23:01

## 2023-01-19 RX ADMIN — HEPARIN SODIUM PRN UNIT: 1000 INJECTION, SOLUTION INTRAVENOUS; SUBCUTANEOUS at 23:00

## 2023-01-19 RX ADMIN — HEPARIN SODIUM PRN UNIT: 1000 INJECTION, SOLUTION INTRAVENOUS; SUBCUTANEOUS at 01:05

## 2023-01-19 NOTE — P.PN
Date of Service: 01/19/23





Subjective:


more alert intermittently, briefly AOX2


occasionally groans intermittently


reported generalized pain





ROS: 


10 point ROS unable to accurately be obtained





Physical exam


GEN: uncomfortable appearing


HEENT: normal conjunctiva, PERRL


CV:  tachycardic, no edema


Pulm:  diminished bilaterally; non-labored; on 5L NC


ABD: Soft, nontender, nondistended


Integumentary: No rashes


Neuro: AOx1, moves all extremities, unable to elaborate on basic answers








Problem List


Diabetes mellitus type 2insulin-dependent with DKA


Severe sepsis / Septic shock secondary to UTI


Acute metabolic encephalopathy secondary to sepsis/DKA


Acute on chronic diastolic congestive heart failure


NSTEMI


CKD 4 with hyperkalemia


History of olivopontocerebellar atrophy








Diabetes mellitus type 2insulin-dependent with DKA


continue insulin drip per protocol; gap closed; but patient to altered to 

advance diet safely at this time


A1c >14


accucheks


IVF





Septic shock / Severe sepsis secondary to UTI


SIRS+, lactate >4 - likely more in part due to DKA / hypoxia vs infection


30cc/kg bolus not given due to patient's volume status / h/o CHF, bilateral 

effusions and pulm edema noted on CT


continue empiric antibiotics - cefepime, dc vanc


Urine : GNR, blood without growth so far


f/u cultures





Acute metabolic encephalopathy secondary to sepsis/DKA


Continue as above, olivopontocerebellar atrophy possibly contributing.





NSTEMI


Acute on chronic diastolic congestive heart failure


pulm edema, b/l effusion noted on CT; received IV Lasix in ED


monitor closely in ICU


cardiology consulted, trop peaked ~30k on 1/18, trending down


   continue heparin drip


echo ordered, awaiting read





CKD 4 with hyperkalemia 


suspect RADHA, unclear what patient's baseline is; secondary to DKA/sepsis


nephrology consulted 


monitor renal function closely


strict i/o's





History of olivopontocerebellar atrophy


Daughter reports patient alert, oriented x4, ambulatory baseline.  Helps care 

for her  who is currently at [facility] recovering from liver 

transplant.








VTE: heparin drip


Code: Full


Dispo: continue icu level of care





Time Spent Managing Pts Care (In Minutes): 55

## 2023-01-19 NOTE — P.PN
Date of Service: 01/19/23


Vital Signs











Temp Pulse Resp BP Pulse Ox


 


 96.7 F L  87   22 H  136/82   95 


 


 01/19/23 04:00  01/19/23 06:00  01/19/23 06:00  01/19/23 06:00  01/19/23 06:00





Medications





Heparin Sodium (Porcine) (Heparin 1,000 Unit/Ml Vial)  0 unit IV PRN PRN


   PRN Reason: Heparin Re-Bolus Per Protocol


   Last Admin: 01/19/23 01:05 Dose:  5,000 unit


   


Insulin Human Regular 100 unit (/ Sodium Chloride)  101 mls @ 0 mls/hr IV CONT 

MYKEL; Protocol


   Last Admin: 01/18/23 12:45 Dose:  101 mls


   


Dextrose/Sodium Chloride (Dextrose 5% O.45% Saline)  1,000 mls @ 75 mls/hr IV 

[id number] MYKEL


   Last Admin: 01/19/23 10:05 Dose:  1,000 mls


   


Cefepime HCl 1 gm/ Sodium (Chloride)  100 mls @ 200 mls/hr IV Q24H MYKEL; Protocol


   Last Admin: 01/18/23 23:22 Dose:  100 mls


   


Vancomycin HCl 1.5 gm/ Sodium (Chloride)  500 mls @ 333.333 mls/hr IVPB Q48H MYKEL


Heparin Sodium/Dextrose (Heparin Drip 25,000 Units/5oo Ml Premix)  25,000 unit 

in 500 mls @ 0 mls/hr IV TITR PRN; Protocol


   PRN Reason: PER PTT RESULTS


   Last Admin: 01/19/23 01:06 Dose:  500 mls


   


Fluconazole (Diflucan 200 Mg/100 Ml Ivpb (Premix))  200 mg in 100 mls @ 100 

mls/hr IV Q24H MYKEL; Protocol


   Last Admin: 01/18/23 20:49 Dose:  100 mls


   


Morphine Sulfate (Morphine 2 Mg/Ml Syr)  2 mg IV Q4H PRN


   PRN Reason: Pain scale 8-10 (Severe)


   Last Admin: 01/19/23 04:07 Dose:  2 mg


   


Ondansetron HCl (Ondansetron 4 Mg/2 Ml Vial)  4 mg IV Q6H PRN


   PRN Reason: NAUSEA / VOMITING


Microbiology Results





01/17/23 19:45   Catheterized Urine   [place] Count - Preliminary


                            >100,000 CFU/ML.


01/17/23 19:45   Catheterized Urine    - Preliminary


                            Gram Neg Gerson


01/17/23 21:00   Blood  - Blood   Aerobic Blood Culture - Preliminary


                            No growth in 24 hours.


01/17/23 21:00   Blood  - Blood   Anaerobic Blood Culture - Final


01/17/23 19:15   Blood  - Blood   Aerobic Blood Culture - Preliminary


                            No growth in 24 hours.


01/17/23 19:15   Blood  - Blood   Anaerobic Blood Culture - Preliminary


                            No growth in 24 hours.





Assessment/ Plan: 


Nephrology





No dyspnea


No chest pain


Moaning intermittently


No acute events overnight


Limited IH/ ROS due to AMS





Vitals, medications, blood work and imaging reviewed in the chart.


NAD.  Obese.  NCAT.  MMM.  Neck supple.  Normal respiratory effort.  RRR.  Abd 

ND.  No C/C.  Distal LE edema.  Hip edema none.  No rash.  Somnolent.  Moaning.


[place] med





Stage I RADHA


CKD IV with proteinuria


-No NSAIDs


-Continue IVF





Hypernatremia


-Continue IVF





HTN with CKD/ CHF


-Hold antihypertensives at this time





Diastolic CHF?


Pulmonary Edema


LE Edema


-Monitor daily weight





DM II with CKD & Hyperglycemia


DKA on admission


-RISS





Anemia in chronic illness


-Monitor H&H





Sepsis


Toxic Metabolic Encephalopathy


-Continue abx








RIGHT ATRIUM: NORMAL LEFT ATRIUM: NORMAL


RIGHT VENTRICLE: NORMAL LEFT VENTRICLE: NORMAL


TRICUSPID VALVE: MILD TRICUSPID REGURGITATION MITRAL VALVE: MITRAL ANNULAR 

CALCIFICATION


PULMONIC VALVE: NORMAL AORTIC VALVE: NORMAL


PERICARDIAL EFFUSION: NONE AORTIC ROOT: NORMAL


LEFT VENTRICULAR WALL MOTION: NORMAL


DOPPLER/COLOR FLOW: SEE BELOW


COMMENTS: NORMAL LEFT VENTRICULAR EJECTION FRACTION 55-60%. NORMAL WALL MOTION. 


 MILD TRICUSPID REGURGIATION.








EXAM DESCRIPTION: US - Renal Ultrasound-Complete - 1/24/2021 6:27 pm


CLINICAL HISTORY: RADHA/CKD


COMPARISON: Abdomen ultrasound March 2015


FINDINGS: The right kidney measures 10.1 x 4.9 x 4.2 cm. The left kidney 

measures 9.9 x 4.5 x 4.5 cm. 


Renal cortical thickness and echogenicity are normal. No hydronephrosis or 

suspicious renal mass.


No bladder wall thickening or mass. No intraluminal stone or mass.


IMPRESSION: No hydronephrosis or suspicious renal mass.


No other significant findings








EXAM DESCRIPTION: CTChest Abd Pelvis Wo Con - 1/17/2023 8:40 pm


CLINICAL HISTORY:


AMS, sob, abd pain


COMPARISON: Head Brain Wo Cont dated 1/17/2023; Chest Single View dated 

1/17/2023; CT 


ABDOMEN PELVIS WO CONTRAST dated 3/20/2015


TECHNIQUE: CT of the chest, abdomen, and pelvis was performed.


All CT scans are performed using dose optimization technique as appropriate and 

may include automated 


exposure control or mA/KV adjustment according to patient size.


FINDINGS: Thorax:


Chest Wall: No abnormal mass


Lungs: Interlobular septal thickening. Motion limited. Atelectasis as a result 

of the effusions.


Pleura: Small bilateral effusions.


Shelli/Mediastinum: No lymphadenopathy.


Aorta/Pulmonary Arteries: Unremarkable


Heart: Normal size. Multi-vessel coronary artery disease.


Abdomen/Pelvis:


Liver: No acute abnormality or suspicious lesions.


Biliary: No biliary ductal dilatation. Cholecystectomy


Stomach: Surgical changes at the stomach.


Duodenum: No significant focal abnormality.


Pancreas: No significant abnormality.


Spleen: No significant abnormality.


Adrenal: No suspicious lesions.


Kidney/ureter: No hydronephrosis. No renal calculi.


Retroperitoneum: No retroperitoneal adenopathy.


Vascular: No aneurysm. Atherosclerosis.


Bowel: Moderate stool in the rectum. Normal appendix. No bowel obstruction..


Peritoneum: No ascites or free air.


Bladder: Grossly unremarkable.


Reproductive: No adnexal masses.


Bones: No acute fracture. Mild scattered degenerative changes are present spine.


IMPRESSION: 1. Findings most likely representing pulmonary edema within the 

lungs.


2. No acute intra-abdominal abnormality.

## 2023-01-20 LAB
ALBUMIN SERPL BCP-MCNC: 2.4 G/DL (ref 3.4–5)
ALP SERPL-CCNC: 214 U/L (ref 45–117)
ALT SERPL W P-5'-P-CCNC: 58 U/L (ref 13–56)
AST SERPL W P-5'-P-CCNC: 46 U/L (ref 15–37)
BUN BLD-MCNC: 50 MG/DL (ref 7–18)
GLUCOSE SERPLBLD-MCNC: 179 MG/DL (ref 74–106)
HCT VFR BLD CALC: 29.8 % (ref 36–45)
LYMPHOCYTES # SPEC AUTO: 1.4 K/UL (ref 0.7–4.9)
MAGNESIUM SERPL-MCNC: 2.2 MG/DL (ref 1.6–2.4)
MCV RBC: 83.8 FL (ref 80–100)
PMV BLD: 9.3 FL (ref 7.6–11.3)
POTASSIUM SERPL-SCNC: 4.2 MMOL/L (ref 3.5–5.1)
RBC # BLD: 3.55 M/UL (ref 3.86–4.86)

## 2023-01-20 RX ADMIN — ONDANSETRON PRN MG: 2 INJECTION INTRAMUSCULAR; INTRAVENOUS at 20:14

## 2023-01-20 RX ADMIN — MORPHINE SULFATE PRN MG: 2 INJECTION, SOLUTION INTRAMUSCULAR; INTRAVENOUS at 20:14

## 2023-01-20 RX ADMIN — HUMAN INSULIN SCH MLS: 100 INJECTION, SOLUTION SUBCUTANEOUS at 15:58

## 2023-01-20 RX ADMIN — DEXTROSE MONOHYDRATE SCH MLS: 50 INJECTION, SOLUTION INTRAVENOUS at 12:07

## 2023-01-20 RX ADMIN — HEPARIN SODIUM AND DEXTROSE PRN MLS: 5000; 5 INJECTION INTRAVENOUS at 17:04

## 2023-01-20 RX ADMIN — FUROSEMIDE SCH MG: 10 INJECTION, SOLUTION INTRAVENOUS at 16:40

## 2023-01-20 RX ADMIN — FUROSEMIDE SCH MG: 10 INJECTION, SOLUTION INTRAVENOUS at 12:07

## 2023-01-20 NOTE — P.PN
Date of Service: 01/20/23





Subjective:


received  IV lasix overnight for concern for overload


awake/alert


some nausea


feels tired /run down' does not recall events leading to her being in hospital





ROS: 


10 point ROS as noted above, otherwise negative





Physical exam


GEN: AOx3, NAD


HEENT: normal conjunctiva, PERRL


CV:  tachycardic, trace bilateral pedal edema


Pulm:  diminished bilaterally with slight crackles; non-labored; on 5L NC


ABD: Soft, nontender, nondistended


Integumentary: No rashes


Neuro: AOx3, moves all extremities








Problem List


Diabetes mellitus type 2insulin-dependent with DKA


Severe sepsis / Septic shock secondary to UTI


Acute metabolic encephalopathy secondary to sepsis/DKA


Acute on chronic diastolic congestive heart failure


NSTEMI


CKD 4 with hyperkalemia


History of olivopontocerebellar atrophy








Diabetes mellitus type 2insulin-dependent with DKA


continue insulin drip per protocol; gap closed; but patient to altered to 

advance diet safely at this time


A1c >14


accucheks


IVF





Septic shock / Severe sepsis secondary to UTI


SIRS+, lactate >4 - likely more in part due to DKA / hypoxia vs infection


30cc/kg bolus not given due to patient's volume status / h/o CHF, bilateral 

effusions and pulm edema noted on CT


continue empiric antibiotics - cefepime, dc vanc


Urine : GNR, blood without growth so far


f/u cultures





Acute metabolic encephalopathy secondary to sepsis/DKA


Continue as above, olivopontocerebellar atrophy possibly contributing.





NSTEMI


Acute on chronic diastolic congestive heart failure


pulm edema, b/l effusion noted on CT; received IV Lasix in ED


monitor closely in ICU


cardiology consulted, trop peaked ~30k on 1/18, trending down


   continue heparin drip


echo ordered





CKD 4 with hyperkalemia 


suspect RADHA, unclear what patient's baseline is; secondary to DKA/sepsis


nephrology consulted 


monitor renal function closely


strict i/o's





History of olivopontocerebellar atrophy


Daughter reports patient alert, oriented x4, ambulatory baseline.  Helps care 

for her  who is currently at [facility] recovering from liver 

transplant.








VTE: heparin drip


Code: Full


Dispo: continue icu level of care





Time Spent Managing Pts Care (In Minutes): 55

## 2023-01-20 NOTE — ECHO
HEIGHT: 5 ft 7 in   WEIGHT: 225 lb 8 oz   DATE OF STUDY: 01/18/2023   REFER DR: Keyon Lloyd NP

2-DIMENSIONAL: YES

     M.MODE: YES

 DOPPLER: YES

COLOR FLOW: YES



                    TDS:  YES

PORTABLE: YES

 DEFINITY:  

BUBBLE STUDY: 





DIAGNOSIS:  CONGESTIVE HEART FAILURE/ EDEMA



CARDIAC HISTORY:  

CATHERIZATION: 

SURGERY: 

PROSTHETIC VALVE: 

PACEMAKER: 





MEASUREMENTS (cm)

    DIASTOLIC (NORMALS)                 SYSTOLIC (NORMALS)

IVSd                 1.3 (0.6-1.2)                    LA Diam 3.3 (1.9-4.0)     LVEF       
  40%  

LVIDd               4.6 (3.5-5.7)                        LVIDs      3.5 (2.0-3.5)     %FS  
        23%

LVPWd             1.4 (0.6-1.2)

Ao Diam           2.5 (2.0-3.7)



2 DIMENSIONAL ASSESSMENT:

RIGHT ATRIUM:                   NOT WELL SEEN

LEFT ATRIUM:       NORMAL



RIGHT VENTRICLE:            NOT WELL SEEN

LEFT VENTRICLE: DEPRESSED EJECTION FRACTION



TRICUSPID VALVE:  MILD TRICUSPID REGURGITATION

MITRAL VALVE:     SEVERE MITRAL ANNULAR CALCIFICATION WITH MILD MITRAL REGURGITATION



PULMONIC VALVE:             NOT WELL SEEN

AORTIC VALVE:     NOT WELL SEEN



PERICARDIAL EFFUSION: NONE

AORTIC ROOT:      NOT WELL SEEN





LEFT VENTRICULAR WALL MOTION:     UNABLE TO EVALUATE (POOR WINDOWS)



DOPPLER/COLOR FLOW:     SEE BELOW



COMMENTS:      

  1. POOR WINDOWS, STUDY WAS LIMITED

  2. LEFT VENTRICULAR EJECTION FRACTION APPEARS MILDLY DEPRESSED, 40%

  3. MILD MITRAL REGURGITATION, MILD TRICUSPID REGURGITATION



TECHNOLOGIST:   DOUG KRAUSE

## 2023-01-20 NOTE — RAD REPORT
EXAM DESCRIPTION:  [place] Single View1/20/2023 7:30 am

 

CLINICAL HISTORY:  Hypoxia

 

COMPARISON:  January 17, 2023

 

FINDINGS:  Mild worsening in bilateral pulmonary opacities

 

The heart remains enlarged. Small bilateral pleural effusions

 

IMPRESSION:  Mild worsening in CHF

## 2023-01-20 NOTE — P.PN
Nephrology note


(S) Pt doing better since last seen, more alert now, remains on insulin drip, 

remains on maintenance IVF, denies dyspnea but latest CXR shows some congestion.







General: NAD, non tachypnec


HEENT: Atraumatic, Normocephalic, Other (NC)


Neck: Supple


Respiratory: Normal air movement, Other (No sig rhonchi)


Cardiovascular: Other (Mildly tachy but mostly regular, trace distal edema)


Gastrointestinal: Soft and benign, Non-distended, No tenderness, No rebound


Musculoskeletal: No contractures, No erythema, Tenderness


Integumentary: No rashes


Neurological: No longer altered, awake, conversive, non focal, no tremors


Laboratory Data (last 24 hrs)





Reviewed in the EMR


Conclusions/Impression: 


A/P





1. Stage 1 RADHA


2. Underlying CKD Stage IV 2nd to DM/HTN


3. Metab acidosis with AG/lactic acidosis on admission -resolved


4. Abnormal findings in urine, unspecified.


5. Pyuria


6. Ecoli complicated UTI


7. DKA on admission


8. CHF unspecified


9. NSTEMI


10. Acute pulm edema


11. Hyperkalemia on admission, resolved





-Renal function tests marginally improved/stable with gentle hydration, non 

anuric, cont to monitor closely


-Will d/c D51/2NS but since pt remains on Insulin drip and BG < 200 and Na at 

144 with plans to start scheduled lasix, will place on D5W at 50 cc/hr temp and 

d/c once pt off insulin drip and taking in oral intake or if BG trend > 150


-Will place on scheduled lasix 20 mg IV BID for now, UOP a bit better, echo 

showed some MR, possible mild LVEF dysfunction.


-Pt remains on heparin drip. Pt's risk for NANI is higher at this time, possibly 

as high as 57.2% per Saeed with 12.6% risk for post PCI NANI requiring HD 

however in the setting of recent infection, DKA, AMS it appeared likely medical 

management for now.


-Hold any ALE inhibitors or aldosterone antagonists currently.


-Cont treatment for Ecoli complicated UTI


-Dose meds for reduced CrCl





Ramsey Alford MD, RABIA

## 2023-01-21 LAB
ALBUMIN SERPL BCP-MCNC: 2.4 G/DL (ref 3.4–5)
ALP SERPL-CCNC: 201 U/L (ref 45–117)
ALT SERPL W P-5'-P-CCNC: 66 U/L (ref 13–56)
AST SERPL W P-5'-P-CCNC: 40 U/L (ref 15–37)
BUN BLD-MCNC: 43 MG/DL (ref 7–18)
GLUCOSE SERPLBLD-MCNC: 155 MG/DL (ref 74–106)
HCT VFR BLD CALC: 31.3 % (ref 36–45)
LYMPHOCYTES # SPEC AUTO: 1.2 K/UL (ref 0.7–4.9)
MCV RBC: 84.6 FL (ref 80–100)
PMV BLD: 9.7 FL (ref 7.6–11.3)
POTASSIUM SERPL-SCNC: 3.9 MMOL/L (ref 3.5–5.1)
RBC # BLD: 3.7 M/UL (ref 3.86–4.86)

## 2023-01-21 RX ADMIN — DEXTROSE MONOHYDRATE SCH MLS: 50 INJECTION, SOLUTION INTRAVENOUS at 05:54

## 2023-01-21 RX ADMIN — FUROSEMIDE SCH MG: 10 INJECTION, SOLUTION INTRAVENOUS at 16:17

## 2023-01-21 RX ADMIN — SODIUM CHLORIDE SCH MLS: 9 INJECTION, SOLUTION INTRAVENOUS at 23:12

## 2023-01-21 RX ADMIN — HUMAN INSULIN SCH UNIT: 100 INJECTION, SOLUTION SUBCUTANEOUS at 20:45

## 2023-01-21 RX ADMIN — SODIUM CHLORIDE SCH MLS: 9 INJECTION, SOLUTION INTRAVENOUS at 00:31

## 2023-01-21 RX ADMIN — HUMAN INSULIN SCH UNIT: 100 INJECTION, SOLUTION SUBCUTANEOUS at 16:17

## 2023-01-21 RX ADMIN — FUROSEMIDE SCH MG: 10 INJECTION, SOLUTION INTRAVENOUS at 08:33

## 2023-01-21 RX ADMIN — MORPHINE SULFATE PRN MG: 2 INJECTION, SOLUTION INTRAMUSCULAR; INTRAVENOUS at 10:13

## 2023-01-21 RX ADMIN — HEPARIN SODIUM AND DEXTROSE PRN MLS: 5000; 5 INJECTION INTRAVENOUS at 12:33

## 2023-01-21 RX ADMIN — ONDANSETRON PRN MG: 2 INJECTION INTRAMUSCULAR; INTRAVENOUS at 08:45

## 2023-01-21 NOTE — P.PN
Date of Service: 01/21/23





Subjective:


feeling better


no acute events overnight


no chest pain





ROS: 


10 point ROS as noted above, otherwise negative





Physical exam


GEN: AOx3, NAD


HEENT: normal conjunctiva, PERRL


CV:  regular rate/rhythm, trace bilateral pedal edema


Pulm:  diminished bilaterally ; non-labored; on 5L NC


ABD: Soft, nontender, nondistended


Integumentary: No rashes


Neuro: AOx3, moves all extremities








Problem List


Diabetes mellitus type 2insulin-dependent with DKA


Severe sepsis / Septic shock secondary to UTI


Acute metabolic encephalopathy secondary to sepsis/DKA


Acute on chronic diastolic congestive heart failure


NSTEMI


CKD 4 with hyperkalemia


History of olivopontocerebellar atrophy








Diabetes mellitus type 2insulin-dependent with DKA


wean insulin drip; start long acting insulin / sliding scale, advance diet


A1c >14


accucheks





Septic shock / Severe sepsis secondary to UTI


SIRS+, lactate >4 - likely more in part due to DKA / hypoxia vs infection


30cc/kg bolus not given due to patient's volume status / h/o CHF, bilateral 

effusions and pulm edema noted on CT


continue empiric antibiotics - cefepime, dc vanc 1/20


Urine : GNR, blood without growth so far


f/u cultures





Acute metabolic encephalopathy secondary to sepsis/DKA


Continue as above, olivopontocerebellar atrophy possibly contributing.





NSTEMI


Acute on chronic diastolic congestive heart failure


pulm edema, b/l effusion noted on CT; received IV Lasix in ED


monitor closely in ICU


cardiology consulted, trop peaked ~35k on 1/18, trending down


   continue heparin drip


echo ordered - poor windows


plan for cath Monday





RADHA on CKD 4 with hyperkalemia 


RADHA, unclear what patient's baseline is; secondary to DKA/sepsis


nephrology consulted 


slight improvement / stable


monitor renal function closely


strict i/o's





History of olivopontocerebellar atrophy


Daughter reports patient alert, oriented x4, ambulatory baseline.  Helps care 

for her  who is currently at [facility] recovering from liver 

transplant.








VTE: heparin drip


Code: Full


Dispo: continue icu level of care


cath monday

## 2023-01-21 NOTE — PN
Date of Progress Note:  01/21/2023



Subjective:  Seen by bedside.  Doing well.  No chest pain.  No shortness of breath.  The patient was 
seen by Dr. Oneil earlier and she had a very high creatinine and high troponin.  Has been on antico
agulation and the kidney function is improving.



Review of Systems:

There is no chest pain, shortness of breath, orthopnea, cough, nausea, vomiting, diarrhea.  All other
 systems reviewed and they were negative.



Physical Examination:

Vital Signs:  Temperature is 96.9, pulse is 100, breathing at 18, blood pressure is 145/80, saturatin
g 94%. 

General:  This is a pleasant middle-aged female, in no apparent distress. 

Head and Neck:  Pupils are equal, reactive to light.  Intact eye movements.  No JVD.  No cervical lym
phadenopathy.  Neck is supple.  Thyroid is not enlarged. 

Lungs:  Clear to auscultation bilaterally.  No rhonchi, wheezing, or crackles.  No accessory muscle u
se. 

Heart:  Regular rate and rhythm.  No extra sounds. 

Abdomen:  Soft, nontender.  Bowel sounds positive.  No organomegaly.  No masses or hernia.  No rigidi
ty or rebound. 

Extremities:  No clubbing or cyanosis.  Intact pulses. 

Skin:  No rash. 

Neurologic:  Alert, awake, oriented x3.  No acute focal deficits appreciated.



Investigations:  BUN is 43, creatinine 2.0, and her troponin peaked at 35,000.



Assessment And Recommendations:  

1.Non-ST elevation myocardial infarction.  Continue IV heparin drip, baby aspirin 81 mg daily, and p
avila for coronary angiogram on Monday.

2.Acute on chronic congestive heart failure exacerbation, on IV Lasix and doing well.  Continue curr
ent management.





SR/MODL

DD:  01/21/2023 16:53:17Voice ID:  [id number]

DT:  01/21/2023 18:33:53Report ID:  [id number]

## 2023-01-22 LAB
ALBUMIN SERPL BCP-MCNC: 2.3 G/DL (ref 3.4–5)
ALP SERPL-CCNC: 171 U/L (ref 45–117)
ALT SERPL W P-5'-P-CCNC: 53 U/L (ref 13–56)
AST SERPL W P-5'-P-CCNC: 23 U/L (ref 15–37)
BUN BLD-MCNC: 38 MG/DL (ref 7–18)
GLUCOSE SERPLBLD-MCNC: 218 MG/DL (ref 74–106)
HCT VFR BLD CALC: 30.6 % (ref 36–45)
LYMPHOCYTES # SPEC AUTO: 0.9 K/UL (ref 0.7–4.9)
MCV RBC: 83.7 FL (ref 80–100)
PMV BLD: 9.5 FL (ref 7.6–11.3)
POTASSIUM SERPL-SCNC: 4.1 MMOL/L (ref 3.5–5.1)
RBC # BLD: 3.66 M/UL (ref 3.86–4.86)

## 2023-01-22 RX ADMIN — SODIUM CHLORIDE SCH MLS: 9 INJECTION, SOLUTION INTRAVENOUS at 23:38

## 2023-01-22 RX ADMIN — MORPHINE SULFATE PRN MG: 2 INJECTION, SOLUTION INTRAMUSCULAR; INTRAVENOUS at 02:43

## 2023-01-22 RX ADMIN — ACETYLCYSTEINE SCH MG: 200 INHALANT RESPIRATORY (INHALATION) at 20:51

## 2023-01-22 RX ADMIN — HUMAN INSULIN SCH UNIT: 100 INJECTION, SOLUTION SUBCUTANEOUS at 11:54

## 2023-01-22 RX ADMIN — ONDANSETRON PRN MG: 2 INJECTION INTRAMUSCULAR; INTRAVENOUS at 16:26

## 2023-01-22 RX ADMIN — HEPARIN SODIUM AND DEXTROSE PRN MLS: 5000; 5 INJECTION INTRAVENOUS at 07:17

## 2023-01-22 RX ADMIN — FUROSEMIDE SCH MG: 10 INJECTION, SOLUTION INTRAVENOUS at 16:44

## 2023-01-22 RX ADMIN — ASPIRIN 81 MG SCH MG: 81 TABLET ORAL at 08:17

## 2023-01-22 RX ADMIN — MORPHINE SULFATE PRN MG: 2 INJECTION, SOLUTION INTRAMUSCULAR; INTRAVENOUS at 20:51

## 2023-01-22 RX ADMIN — HUMAN INSULIN SCH UNIT: 100 INJECTION, SOLUTION SUBCUTANEOUS at 08:17

## 2023-01-22 RX ADMIN — HUMAN INSULIN SCH UNIT: 100 INJECTION, SOLUTION SUBCUTANEOUS at 16:42

## 2023-01-22 RX ADMIN — FUROSEMIDE SCH MG: 10 INJECTION, SOLUTION INTRAVENOUS at 08:17

## 2023-01-22 RX ADMIN — ONDANSETRON PRN MG: 2 INJECTION INTRAMUSCULAR; INTRAVENOUS at 13:33

## 2023-01-22 RX ADMIN — HUMAN INSULIN SCH UNIT: 100 INJECTION, SOLUTION SUBCUTANEOUS at 20:50

## 2023-01-22 RX ADMIN — INSULIN GLARGINE SCH UNIT: 100 INJECTION, SOLUTION SUBCUTANEOUS at 08:18

## 2023-01-22 NOTE — PN
Date of Progress Note:  01/22/2023



Subjective:  The patient was seen and examined at bedside.  She is doing much better.  She is having 
some nausea and vomiting.  She is getting some Zofran for that.  Otherwise, she has been doing okay, 
but has not been able to eat very well.  She is scheduled to get a heart catheterization tomorrow.  O
therwise, she has remained hemodynamically stable and denies any other complaints.



Physical Examination:

Vital Signs:  At this time, have been reviewed and are stable.  

General:  She appears in no acute distress.  

HEENT:  Atraumatic head. 

Lungs:  Clear to auscultation.  

Abdomen:  Soft and nontender. 

Extremities:  No evidence of edema.



Laboratory Data:  Creatinine of 1.96, BUN of 38, and electrolytes are stable.  CBC showing stable hem
oglobin and hematocrit and platelet count of 350.



Medications:  Current medications have been reviewed.  She remains on cefepime 1 g every 24 hours for
 urinary tract infection.  She is also on heparin.



Impression:  

1.Acute-on-chronic renal insufficiency secondary to acute tubular necrosis from sepsis, improving at
 this time.

2.Lactic acidosis from sepsis, improving.

3.Diabetic ketoacidosis, off insulin drip and currently on subcutaneous insulin.  Monitor blood suga
r levels.

4.Non-ST elevation myocardial infarction.  The patient is scheduled to get heart catheterization, We
 will order Mucomyst for renal protection.

5.Hypertension.  Currently stable.

6.Urinary tract infection with urine culture showing gram-negative rods.  Escherichia coli noted on 
the urine culture.  The patient is currently getting cefepime, which can possibly be deescalated to p
.o. Levaquin at the time of discharge.



Plan:  The patient is overall doing okay at this time.  I will order Mucomyst for renal protection.  
Monitor renal function closely with heart catheterization and contrast exposure and we will follow up
 closely.  The plan was updated with the patient's family at bedside.





VV/MODL

DD:  01/22/2023 16:33:19Voice ID:  [id number]

DT:  01/22/2023 20:59:11Report ID:  [id number]

## 2023-01-22 NOTE — P.PN
Date of Service: 01/22/23





Subjective:


feeling better


appetite improving


breathing ok


no chest pain





ROS: 


10 point ROS as noted above, otherwise negative





Physical exam


GEN: AOx3, NAD


HEENT: normal conjunctiva, PERRL


CV:  regular rate/rhythm, intermittent sinus tachycardia, trace bilateral pedal 

edema


Pulm:  diminished bilaterally ; non-labored; on 5L NC


ABD: Soft, nontender, nondistended


Integumentary: No rashes


Neuro: AOx3, moves all extremities








Problem List


Diabetes mellitus type 2insulin-dependent with DKA


Severe sepsis / Septic shock secondary to UTI


Acute metabolic encephalopathy secondary to sepsis/DKA


Acute on chronic diastolic congestive heart failure


NSTEMI


CKD 4 with hyperkalemia


History of olivopontocerebellar atrophy








Diabetes mellitus type 2insulin-dependent with DKA


weaned insulin drip; start long acting insulin / sliding scale 1/21, advance 

diet


A1c >14


accucheks





Septic shock / Severe sepsis secondary to UTI


SIRS+, lactate >4 - likely more in part due to DKA / hypoxia vs infection


30cc/kg bolus not given due to patient's volume status / h/o CHF, bilateral 

effusions and pulm edema noted on CT


continue empiric antibiotics - cefepime, dc vanc 1/20


Urine : GNR, blood without growth so far


f/u cultures





Acute metabolic encephalopathy secondary to sepsis/DKA


Continue as above, olivopontocerebellar atrophy possibly contributing.





NSTEMI


Acute on chronic diastolic congestive heart failure


pulm edema, b/l effusion noted on CT; received IV Lasix in ED


monitor closely in ICU


cardiology consulted, trop peaked ~35k on 1/18, trending down


   continue heparin drip


echo ordered - poor windows


plan for cath Monday





RADHA on CKD 4 with hyperkalemia 


RADHA, unclear what patient's baseline is; secondary to DKA/sepsis


nephrology consulted 


slight improvement / stable


monitor renal function closely


strict i/o's





History of olivopontocerebellar atrophy


Daughter reports patient alert, oriented x4, ambulatory baseline.  Helps care 

for her  who is currently at [facility] recovering from liver 

transplant.








VTE: heparin drip


Code: Full


Dispo: can downgrade from ICU later today


plan for cath tomorrow

## 2023-01-23 VITALS — OXYGEN SATURATION: 96 %

## 2023-01-23 VITALS — SYSTOLIC BLOOD PRESSURE: 160 MMHG | DIASTOLIC BLOOD PRESSURE: 77 MMHG

## 2023-01-23 VITALS — TEMPERATURE: 96.9 F

## 2023-01-23 LAB
ALBUMIN SERPL BCP-MCNC: 2.3 G/DL (ref 3.4–5)
ALP SERPL-CCNC: 150 U/L (ref 45–117)
ALT SERPL W P-5'-P-CCNC: 40 U/L (ref 13–56)
AST SERPL W P-5'-P-CCNC: 15 U/L (ref 15–37)
BUN BLD-MCNC: 31 MG/DL (ref 7–18)
GLUCOSE SERPLBLD-MCNC: 200 MG/DL (ref 74–106)
HCT VFR BLD CALC: 30.9 % (ref 36–45)
LYMPHOCYTES # SPEC AUTO: 0.9 K/UL (ref 0.7–4.9)
MAGNESIUM SERPL-MCNC: 1.8 MG/DL (ref 1.6–2.4)
MCV RBC: 83.5 FL (ref 80–100)
PMV BLD: 8.9 FL (ref 7.6–11.3)
POTASSIUM SERPL-SCNC: 3.4 MMOL/L (ref 3.5–5.1)
RBC # BLD: 3.7 M/UL (ref 3.86–4.86)

## 2023-01-23 PROCEDURE — 4A023N7 MEASUREMENT OF CARDIAC SAMPLING AND PRESSURE, LEFT HEART, PERCUTANEOUS APPROACH: ICD-10-PCS

## 2023-01-23 PROCEDURE — B2111ZZ FLUOROSCOPY OF MULTIPLE CORONARY ARTERIES USING LOW OSMOLAR CONTRAST: ICD-10-PCS

## 2023-01-23 RX ADMIN — HUMAN INSULIN SCH: 100 INJECTION, SOLUTION SUBCUTANEOUS at 11:30

## 2023-01-23 RX ADMIN — INSULIN GLARGINE SCH: 100 INJECTION, SOLUTION SUBCUTANEOUS at 07:26

## 2023-01-23 RX ADMIN — HUMAN INSULIN SCH: 100 INJECTION, SOLUTION SUBCUTANEOUS at 07:26

## 2023-01-23 RX ADMIN — ASPIRIN 81 MG SCH MG: 81 TABLET ORAL at 10:00

## 2023-01-23 RX ADMIN — ASPIRIN 81 MG SCH: 81 TABLET ORAL at 07:26

## 2023-01-23 RX ADMIN — INSULIN GLARGINE SCH UNIT: 100 INJECTION, SOLUTION SUBCUTANEOUS at 09:59

## 2023-01-23 RX ADMIN — ACETYLCYSTEINE SCH: 200 INHALANT RESPIRATORY (INHALATION) at 07:26

## 2023-01-23 RX ADMIN — ACETYLCYSTEINE SCH MG: 200 INHALANT RESPIRATORY (INHALATION) at 09:59

## 2023-01-23 RX ADMIN — ONDANSETRON PRN MG: 2 INJECTION INTRAMUSCULAR; INTRAVENOUS at 12:23

## 2023-01-23 NOTE — OP
Date of Procedure:  01/23/2023



Surgeon:  FRENCH NOVOA



Procedures Performed:  

1.Selective coronary angiogram.

2.Left heart catheterization.



Indication:  Non-ST-elevation myocardial infarction.



Access:  Right femoral artery 6-Finnish closed with 6-Finnish Angio-Seal.



Complications:  None.



Bleeding:  Less than 20 mL.



Total Contrast Used:  25 mL.



Anesthesia:  Total sedation time was 30 minutes.  Used fentanyl and Versed.



Description Of Procedure:  After risks, benefits, symptoms were explained, patient agreed to procedur
e and signed informed consent.  Patient was brought into the cardiac catheterization laboratory, prep
ped and draped in usual sterile fashion.  Then, I accessed right femoral artery using micropuncture k
it, fluoroscopy, and ultrasound guidance and placed a 6-Finnish [place] sheath.  Then, took a 6-Frenc
h JL4 catheter into the aortic root over a J-wire, engaged left main, took standard views and then ex
changed for a 6-Finnish JR4 catheter, engaged the RCA and took standard views and then the catheter wa
s pushed over the wire into the LV measuring LVEDP and pullback did not record any gradient.  Then, t
he catheter was removed.  Sheath was removed and placed a 6-Finnish Angio-Seal for closure with hemost
asis.



Findings:  

1.Left main is large and normal.

2.LAD:  Distal portion of the proximal segment started having significant stenosis about 80%, extend
s all the way to the mid segment and then it is free of disease.  Diagonal branches have mild diffuse
 20% to 30% stenosis.

3.Left circumflex:  Proximal 90% stenosis and the OM has diffuse 70% stenosis.

4.RCA:  It is dominant circulation with mid 99% stenosis and diffuse 40% stenosis.

5.Elevated LVEDP at 32 mmHg.



Conclusion:  

1.Severe multivessel coronary artery disease.

2.Elevated LVEDP.



Recommendation:  Transfer for CABG.





SR/MODL

DD:  01/23/2023 07:58:44Voice ID:  [id number]

DT:  01/23/2023 09:54:25Report ID:  [id number]

## 2023-01-23 NOTE — P.PN
Date of Service: 01/23/23





Subjective:











ROS: 


10 point ROS as noted above, otherwise negative





Physical exam


GEN: AOx3, NAD


HEENT: normal conjunctiva, PERRL


CV:  regular rate/rhythm, intermittent sinus tachycardia, trace bilateral pedal 

edema


Pulm:  diminished bilaterally ; non-labored; on 5L NC


ABD: Soft, nontender, nondistended


Integumentary: No rashes


Neuro: AOx3, moves all extremities








Problem List


Diabetes mellitus type 2insulin-dependent with DKA


Severe sepsis / Septic shock secondary to UTI


Acute metabolic encephalopathy secondary to sepsis/DKA


Acute on chronic diastolic congestive heart failure


NSTEMI


CKD 4 with hyperkalemia


History of olivopontocerebellar atrophy








Diabetes mellitus type 2insulin-dependent with DKA


weaned insulin drip; start long acting insulin / sliding scale 1/21, advance 

diet


A1c >14


accucheks





Septic shock / Severe sepsis secondary to UTI


SIRS+, lactate >4 - likely more in part due to DKA / hypoxia vs infection


30cc/kg bolus not given due to patient's volume status / h/o CHF, bilateral 

effusions and pulm edema noted on CT


continue empiric antibiotics - cefepime, dc vanc 1/20


Urine : GNR, blood without growth so far


f/u cultures





Acute metabolic encephalopathy secondary to sepsis/DKA


Continue as above, olivopontocerebellar atrophy possibly contributing.





NSTEMI


Acute on chronic diastolic congestive heart failure


pulm edema, b/l effusion noted on CT; received IV Lasix in ED


monitor closely in ICU


cardiology consulted, trop peaked ~35k on 1/18, trending down


   continue heparin drip


echo ordered - poor windows


plan for cath Monday





RADHA on CKD 4 with hyperkalemia 


RADHA, unclear what patient's baseline is; secondary to DKA/sepsis


nephrology consulted 


slight improvement / stable


monitor renal function closely


strict i/o's





History of olivopontocerebellar atrophy


Daughter reports patient alert, oriented x4, ambulatory baseline.  Helps care 

for her  who is currently at [facility] recovering from liver 

transplant.








VTE: heparin drip


Code: Full


Dispo: can downgrade from ICU later today


plan for cath tomorrow

## 2023-01-23 NOTE — PN
Date of Progress Note:  01/21/2023



Subjective:  The patient was seen and examined at bedside in the ICU.  Her sugars are doing better.  
She is off insulin __________ 

General:  She appears in no acute distress. 

Lungs:  Clear to auscultation. 

Abdomen:  Soft and nontender. 

Extremities:  Showed no evidence of edema. 

Neurologic:  She is alert, awake, oriented x3.



Laboratory Data:  At this time showing sodium of 139, potassium of 3.9, chloride of 97, BUN of 43, cr
eatinine of 2.3, glucose __________ and platelet count of 302, WBC count is 11.9.



Current Medications:  Include cefepime 1 g every 24 hours, D5 water has been discontinued, Lasix 20 m
g IV b.i.d., insulin, and morphine p.r.n. for pain.



Impression:  

1.Acute on chronic renal insufficiency, currently with stable renal function.  The patient has under
lying diabetic nephropathy with a baseline creatinine of around 2.  She has mild acute kidney injury 
related to acute tubular necrosis from ongoing sepsis and diabetic ketoacidosis.  Currently with impr
oving renal function __________ on IV fluids.

2.E coli urinary tract infection.  Currently, the patient remains on cefepime __________.  She could
 possibly be deescalated on antibiotics and switched to Rocephin and monitor closely.

3.Diabetic ketoacidosis, currently with improving blood sugars.  The patient is on long-acting and s
hort-acting insulin and off insulin drip.

4.Underlying __________ Non-ST segment elevation myocardial 

infarction.  The patient is being followed by Cardiology.  She is currently on Lasix __________.





VV/MODL

DD:  01/21/2023 15:31:51Voice ID:  [id number]

DT:  01/21/2023 19:31:27Report ID:  [id number]

## 2023-01-23 NOTE — P.PN
Brief Renal note:





Attempted to see pt this AM, but out for procedure/LHC, stable renal function, 

improved from admission levels. Risk for NANI prev discussed. Pt started on 

gentle fluid hydration overnight sharri-contrast exposure after diuresis in prior 

days. NAC also ordered. Total IVF 1L or less and will bolus lasix post 

procedure. Monitor for NANI over the next 48h.

## 2023-01-23 NOTE — PN
Date of Progress Note:  01/23/2023



Subjective:  Seen by bedside.  Doing clinically well.  Does not have any further chest pain.  No naus
ea, vomiting, or diarrhea.  All other systems reviewed are negative.



Physical Examination:

Vital Signs:  Temperature is 98.4, pulse 82, breathing at 18, blood pressure 142/81, saturating 96% o
n room air. 

General:  Pleasant middle-aged female, in no apparent distress. 

Head and Neck:  Pupils are equal, reactive to light.  Intact eye movements.  No JVD.  No cervical lym
phadenopathy.  Neck is supple.  Thyroid is not enlarged. 

Lungs:  Clear to auscultation bilaterally.  No rhonchi, wheezing, or crackles.  No accessory muscle u
se. 

Heart:  Regular rate and rhythm.  No extra sounds. 

Abdomen:  Soft, nontender.  Bowel sounds positive.  No organomegaly.  No masses or hernia.  No rigidi
ty or rebound. 

Extremities:  No edema, clubbing, cyanosis.  Intact pulses. 

Skin:  No rash. 

Neurologic:  Alert, awake, oriented x3.  No acute focal deficits appreciated.



Investigations:  BUN 31, creatinine 1.86.



Assessment/recommendations:  

1.Non-ST elevation myocardial infarction.  She is n.p.o.  Plan for coronary angiogram this morning.

2.Acute renal failure, resolving nicely.  Creatinine approaching normal limits.  Continue current ma
nagement.

3.Acute on chronic congestive heart failure exacerbation.  Appears to be euvolemic.





SR/MODL

DD:  01/23/2023 07:09:16Voice ID:  [id number]

DT:  01/23/2023 09:02:35Report ID:  [id number]

## 2023-01-30 NOTE — CON
Date of Consultation:  01/18/2023



Reason For Consultation:  Abnormal troponin in the face of sepsis.



History Of Present Illness:  Ms. Faye is 67.  Has a history of diabetes, hypertension.  Came in with 
sepsis, DKA, was found to have a troponin that was elevated.  Her creatinine was 2.81.  Her white cou
nt was 22,000.  Denied any chest pain.  Denied any shortness of breath.  No nausea, vomiting, diaphor
esis, PND, orthopnea, pedal edema, palpitation, or syncope.



Past Medical History:  Includes diabetes, hypertension.



Allergies:  SHE IS ALLERGIC TO NO MEDICATION.



Review of Systems:

Negative.



Social History:  Negative.



Family History:  Negative.



Medications:  At home include Lipitor, Norvasc, Lasix, insulin, and Aldactone.



Physical Examination:

Vital Signs:  Stable, afebrile. 

HEENT:  Negative. 

Neck:  Supple with no bruit. 

Chest:  Clear to auscultation and percussion. 

Cardiac:  Revealed a regular rhythm and rate.  No murmurs, gallops, or rubs. 

Abdomen:  Benign. 

Extremities:  Revealed no clubbing, cyanosis, or edema.



Diagnostic Data:  As stated earlier.



Impression And Plan:  

1.Diabetic ketoacidosis.

2.Sepsis.

3.Elevated troponin secondary to sepsis.

4.Diabetes.

5.Hypertension.

6.Dyslipidemia.  The patient is presently on a heparin drip.  I think we need to switch that to subc
u heparin for deep vein thrombosis prophylaxis.  She is to continue antibiotics and we need to contin
ue her insulin, consult Nephrology.  Echocardiogram is pending.  After she get gets discharged, we 
natividad make an arrangement for her to have an outpatient Lexiscan and an appointment in the office.





NB/SHAMA

DD:  01/30/2023 15:07:22Voice ID:  [id number]

DT:  01/30/2023 16:04:48Report ID:  [id number]

## 2023-01-31 NOTE — P.DS
Admission Date: 01/17/23


Discharge Date: 01/23/23


Disposition: TRANSFER TO [facility]


Reason for Admission: DKA, severe sepsis, UTI


Consultations: 





Cardiology 


Nephrology





Brief History of Present Illness: 





67-year-old female with history of insulin-dependent diabetes, 

olivopontocerebellar atrophy, CKD 4, CHF, hypertension presents emergency 

department for altered mental status.  Her daughter reports that she lives alone

she was last seen normal on Sunday.  She is typically alert, oriented x4 and 

ambulatory.  Daughter reports that patient has been spending a lot of time with 

her  who is currently at [facility] recovering from a liver transplant for

the last 2 months, daughter is unsure what patient has been eating or if she is 

been taking her medications.  Upon presentation to the emergency department 

patient is confused, agitated screaming "please help me".  Patient was evaluated

in the emergency department her labs demonstrated DKA, hyperkalemia, 

leukocytosis urine was positive for UTI, CT chest abdomen pelvis without 

contrast was performed which revealed pulmonary edema.  CT head negative for 

acute findings.  Patient was started on insulin drip, given IV 

antibioticsRocephin.  She is now resting comfortably on stretcher.  ED provider

wishes to admit for further evaluation and management of DKA, severe sepsis, 

UTI.





Hospital Course: 





Problem List


Diabetes mellitus type 2insulin-dependent with DKA


Severe sepsis / Septic shock secondary to UTI


Acute metabolic encephalopathy secondary to sepsis/DKA


Acute on chronic diastolic congestive heart failure


NSTEMI


CKD 4 with hyperkalemia


History of olivopontocerebellar atrophy





Patient had gradual improvement and her mentation returned back to normal. 

Treated with isulin drip and empiric antibiotics.


Troponin was noted to trend upwards to 35k. Cardiology was consulted and patient

underwent coronary angiogram - noted multiple areas of stenosis. Given the 

location and size of stenosis, Dr. Thompson recommended patient be transferred to 

tertiary care center for evaluation for CABG.


Patient was transferred on 1/23 to [facility].





Urine culture grew e.coli - resistant to ampicillin/unasyn, otherwise sensitive.





Vital Signs/Physical Exam: 














Temp Pulse Resp BP Pulse Ox


 


 96.9 F   87   16   160/77 H  98 


 


 01/23/23 12:00  01/23/23 15:30  01/23/23 15:30  01/23/23 15:30  01/23/23 15:30








General: Alert, In no apparent distress, Oriented x3


HEENT: Sclerae nonicteric


Respiratory: Diminished, Other (nonlabored respirations)


Cardiovascular: No edema, Regular rate/rhythm


Gastrointestinal: Soft and benign, Non-distended, No tenderness


Musculoskeletal: No contractures, No tenderness


Integumentary: No rashes, No significant lesion


Neurological: Normal speech, Normal affect


Laboratory Data at Discharge: 














WBC  9.00 K/uL (4.3-10.9)   01/23/23  04:45    


 


Hgb  9.9 g/dL (12.0-15.0)  L  01/23/23  04:45    


 


Hct  30.9 % (36.0-45.0)  L  01/23/23  04:45    


 


Plt Count  368 K/uL (152-406)   01/23/23  04:45    


 


PT  11.5 SECONDS (9.5-12.5)   01/17/23  19:15    


 


INR  1.05   01/17/23  19:15    


 


APTT  Cancelled   01/23/23  19:00    


 


Sodium  140 mmol/L (136-145)   01/23/23  04:45    


 


Potassium  3.4 mmol/L (3.5-5.1)  L D 01/23/23  04:45    


 


BUN  31 mg/dL (7-18)  H  01/23/23  04:45    


 


Creatinine  1.86 mg/dL (0.55-1.02)  H  01/23/23  04:45    


 


Glucose  200 mg/dL ()  H  01/23/23  04:45    


 


Phosphorus  3.4 mg/dL (2.5-4.9)   01/20/23  03:25    


 


Magnesium  1.8 mg/dL (1.6-2.4)   01/23/23  04:45    


 


Total Bilirubin  0.5 mg/dL (0.2-1.0)   01/23/23  04:45    


 


AST  15 U/L (15-37)   01/23/23  04:45    


 


ALT  40 U/L (13-56)   01/23/23  04:45    


 


Alkaline Phosphatase  150 U/L ()  H  01/23/23  04:45    


 


Triglycerides  89 mg/dL (<150)   01/18/23  04:30    


 


Cholesterol  127 mg/dL (<200)   01/18/23  04:30    


 


HDL Cholesterol  41 mg/dL (40-60)   01/18/23  04:30    


 


Cholesterol/HDL Ratio  3.10   01/18/23  04:30    








Home Medications: 








Atorvastatin Calcium [Lipitor*] 20 mg PO DAILY 01/02/22 


Furosemide [Lasix] 40 mg PO BIDL 01/02/22 


Gabapentin 300 mg PO TID 01/02/22 


Insulin Aspart [Novolog Flexpen] 10 unit SQ TIDWM 01/02/22 


Insulin Degludec [Tresiba] 50 unit SQ DAILY 01/02/22 


Spironolactone 50 mg PO DAILY 01/02/22 


Amitriptyline [Elavil] 25 mg PO BEDTIME 01/20/23 


Furosemide 40 mg PO BID 01/20/23 


carvediloL [Carvedilol] 6.25 mg PO BID 01/20/23 





Followup: 


NONE,NONE [Primary Care Provider] - 


Time spent managing pt's care (in minutes): 45